# Patient Record
Sex: FEMALE | Race: WHITE | Employment: FULL TIME
[De-identification: names, ages, dates, MRNs, and addresses within clinical notes are randomized per-mention and may not be internally consistent; named-entity substitution may affect disease eponyms.]

---

## 2017-01-31 ENCOUNTER — OFFICE VISIT (OUTPATIENT)
Dept: FAMILY MEDICINE CLINIC | Facility: CLINIC | Age: 42
End: 2017-01-31

## 2017-01-31 VITALS
DIASTOLIC BLOOD PRESSURE: 78 MMHG | OXYGEN SATURATION: 98 % | WEIGHT: 200 LBS | HEART RATE: 72 BPM | BODY MASS INDEX: 33.28 KG/M2 | SYSTOLIC BLOOD PRESSURE: 138 MMHG | RESPIRATION RATE: 14 BRPM

## 2017-01-31 DIAGNOSIS — R07.9 INTERMITTENT CHEST PAIN: ICD-10-CM

## 2017-01-31 DIAGNOSIS — Z72.0 TOBACCO ABUSE: Primary | ICD-10-CM

## 2017-01-31 DIAGNOSIS — R60.9 PERIPHERAL EDEMA: Primary | ICD-10-CM

## 2017-01-31 DIAGNOSIS — R06.02 SHORTNESS OF BREATH: ICD-10-CM

## 2017-01-31 PROCEDURE — 99213 OFFICE O/P EST LOW 20 MIN: CPT | Performed by: NURSE PRACTITIONER

## 2017-01-31 RX ORDER — VARENICLINE TARTRATE 1 MG/1
TABLET, FILM COATED ORAL
Qty: 60 TABLET | Refills: 0 | Status: CANCELLED | OUTPATIENT
Start: 2017-01-31

## 2017-01-31 RX ORDER — VARENICLINE TARTRATE 0.5 MG/1
TABLET, FILM COATED ORAL
Qty: 95 TABLET | Refills: 0 | Status: SHIPPED | OUTPATIENT
Start: 2017-01-31 | End: 2017-06-05

## 2017-01-31 ASSESSMENT — ENCOUNTER SYMPTOMS
COUGH: 0
NAUSEA: 0
COLOR CHANGE: 0
ABDOMINAL PAIN: 0
WHEEZING: 0
VOMITING: 0
SHORTNESS OF BREATH: 1

## 2017-02-13 ENCOUNTER — TELEPHONE (OUTPATIENT)
Dept: FAMILY MEDICINE CLINIC | Facility: CLINIC | Age: 42
End: 2017-02-13

## 2017-02-16 ENCOUNTER — OFFICE VISIT (OUTPATIENT)
Dept: FAMILY MEDICINE CLINIC | Facility: CLINIC | Age: 42
End: 2017-02-16

## 2017-02-16 VITALS
SYSTOLIC BLOOD PRESSURE: 108 MMHG | HEART RATE: 75 BPM | WEIGHT: 199 LBS | DIASTOLIC BLOOD PRESSURE: 72 MMHG | BODY MASS INDEX: 33.12 KG/M2

## 2017-02-16 DIAGNOSIS — I82.401 ACUTE DEEP VEIN THROMBOSIS (DVT) OF RIGHT LOWER EXTREMITY, UNSPECIFIED VEIN (HCC): ICD-10-CM

## 2017-02-16 DIAGNOSIS — F41.8 SITUATIONAL ANXIETY: ICD-10-CM

## 2017-02-16 DIAGNOSIS — I82.C12: Primary | ICD-10-CM

## 2017-02-16 PROBLEM — I82.C19: Status: ACTIVE | Noted: 2017-02-16

## 2017-02-16 PROCEDURE — 99214 OFFICE O/P EST MOD 30 MIN: CPT | Performed by: NURSE PRACTITIONER

## 2017-02-16 RX ORDER — ALPRAZOLAM 0.25 MG/1
0.25 TABLET ORAL 3 TIMES DAILY PRN
Qty: 30 TABLET | Refills: 0 | Status: SHIPPED | OUTPATIENT
Start: 2017-02-16 | End: 2019-05-06 | Stop reason: SDUPTHER

## 2017-02-16 RX ORDER — APIXABAN 5 MG/1
5 TABLET, FILM COATED ORAL 2 TIMES DAILY
Refills: 0 | COMMUNITY
Start: 2017-02-12 | End: 2017-06-05 | Stop reason: SDUPTHER

## 2017-02-16 ASSESSMENT — ENCOUNTER SYMPTOMS
BLOOD IN STOOL: 0
NAUSEA: 0
ORTHOPNEA: 0
SHORTNESS OF BREATH: 1
VOMITING: 0

## 2017-02-20 ENCOUNTER — APPOINTMENT (OUTPATIENT)
Dept: CT IMAGING | Age: 42
End: 2017-02-20
Payer: COMMERCIAL

## 2017-02-20 ENCOUNTER — HOSPITAL ENCOUNTER (OUTPATIENT)
Age: 42
Discharge: HOME | End: 2017-02-20
Payer: COMMERCIAL

## 2017-02-20 ENCOUNTER — HOSPITAL ENCOUNTER (EMERGENCY)
Age: 42
Discharge: HOME | End: 2017-02-20
Attending: EMERGENCY MEDICINE
Payer: COMMERCIAL

## 2017-02-20 ENCOUNTER — APPOINTMENT (OUTPATIENT)
Dept: GENERAL RADIOLOGY | Age: 42
End: 2017-02-20
Payer: COMMERCIAL

## 2017-02-20 ENCOUNTER — TELEPHONE (OUTPATIENT)
Dept: FAMILY MEDICINE CLINIC | Facility: CLINIC | Age: 42
End: 2017-02-20

## 2017-02-20 VITALS
SYSTOLIC BLOOD PRESSURE: 119 MMHG | BODY MASS INDEX: 33.15 KG/M2 | DIASTOLIC BLOOD PRESSURE: 68 MMHG | OXYGEN SATURATION: 96 % | TEMPERATURE: 98.3 F | WEIGHT: 199 LBS | HEART RATE: 61 BPM | HEIGHT: 65 IN | RESPIRATION RATE: 18 BRPM

## 2017-02-20 DIAGNOSIS — Z86.718 HISTORY OF DVT (DEEP VEIN THROMBOSIS): Primary | ICD-10-CM

## 2017-02-20 DIAGNOSIS — R06.02 SHORTNESS OF BREATH: Primary | ICD-10-CM

## 2017-02-20 LAB
ABSOLUTE EOS #: 0.1 K/UL (ref 0–0.4)
ABSOLUTE LYMPH #: 2.5 K/UL (ref 1–4.8)
ABSOLUTE MONO #: 0.6 K/UL (ref 0.1–1.3)
ANION GAP SERPL CALCULATED.3IONS-SCNC: 15 MMOL/L (ref 9–17)
BASOPHILS # BLD: 1 % (ref 0–2)
BASOPHILS ABSOLUTE: 0.1 K/UL (ref 0–0.2)
BUN BLDV-MCNC: 20 MG/DL (ref 6–20)
BUN/CREAT BLD: ABNORMAL (ref 9–20)
CALCIUM SERPL-MCNC: 9.9 MG/DL (ref 8.6–10.4)
CHLORIDE BLD-SCNC: 102 MMOL/L (ref 98–107)
CO2: 25 MMOL/L (ref 20–31)
CREAT SERPL-MCNC: 0.75 MG/DL (ref 0.5–0.9)
DIFFERENTIAL TYPE: ABNORMAL
EOSINOPHILS RELATIVE PERCENT: 1 % (ref 0–4)
GFR AFRICAN AMERICAN: >60 ML/MIN
GFR NON-AFRICAN AMERICAN: >60 ML/MIN
GFR SERPL CREATININE-BSD FRML MDRD: ABNORMAL ML/MIN/{1.73_M2}
GFR SERPL CREATININE-BSD FRML MDRD: ABNORMAL ML/MIN/{1.73_M2}
GLUCOSE BLD-MCNC: 124 MG/DL (ref 70–99)
HCT VFR BLD CALC: 42.9 % (ref 36–46)
HEMOGLOBIN: 14.2 G/DL (ref 12–16)
LYMPHOCYTES # BLD: 24 % (ref 24–44)
MAGNESIUM: 2 MG/DL (ref 1.6–2.6)
MCH RBC QN AUTO: 30.2 PG (ref 26–34)
MCHC RBC AUTO-ENTMCNC: 33.1 G/DL (ref 31–37)
MCV RBC AUTO: 91.2 FL (ref 80–100)
MONOCYTES # BLD: 6 % (ref 1–7)
PDW BLD-RTO: 14.5 % (ref 11.5–14.9)
PHOSPHORUS: 4.1 MG/DL (ref 2.6–4.5)
PLATELET # BLD: 255 K/UL (ref 150–450)
PLATELET ESTIMATE: ABNORMAL
PMV BLD AUTO: 8.3 FL (ref 6–12)
POTASSIUM SERPL-SCNC: 4.1 MMOL/L (ref 3.7–5.3)
RBC # BLD: 4.7 M/UL (ref 4–5.2)
RBC # BLD: ABNORMAL 10*6/UL
SEG NEUTROPHILS: 68 % (ref 36–66)
SEGMENTED NEUTROPHILS ABSOLUTE COUNT: 7 K/UL (ref 1.3–9.1)
SODIUM BLD-SCNC: 142 MMOL/L (ref 135–144)
TROPONIN INTERP: NORMAL
TROPONIN T: <0.03 NG/ML
WBC # BLD: 10.3 K/UL (ref 3.5–11)
WBC # BLD: ABNORMAL 10*3/UL

## 2017-02-20 PROCEDURE — 84484 ASSAY OF TROPONIN QUANT: CPT

## 2017-02-20 PROCEDURE — 2580000003 HC RX 258: Performed by: EMERGENCY MEDICINE

## 2017-02-20 PROCEDURE — 6360000004 HC RX CONTRAST MEDICATION: Performed by: EMERGENCY MEDICINE

## 2017-02-20 PROCEDURE — 85025 COMPLETE CBC W/AUTO DIFF WBC: CPT

## 2017-02-20 PROCEDURE — 80048 BASIC METABOLIC PNL TOTAL CA: CPT

## 2017-02-20 PROCEDURE — 71260 CT THORAX DX C+: CPT

## 2017-02-20 PROCEDURE — 83735 ASSAY OF MAGNESIUM: CPT

## 2017-02-20 PROCEDURE — 71260 CT THORAX DX C+: CPT | Performed by: RADIOLOGY

## 2017-02-20 PROCEDURE — 99285 EMERGENCY DEPT VISIT HI MDM: CPT

## 2017-02-20 PROCEDURE — 84100 ASSAY OF PHOSPHORUS: CPT

## 2017-02-20 PROCEDURE — 36415 COLL VENOUS BLD VENIPUNCTURE: CPT

## 2017-02-20 PROCEDURE — 93005 ELECTROCARDIOGRAM TRACING: CPT

## 2017-02-20 RX ORDER — 0.9 % SODIUM CHLORIDE 0.9 %
100 INTRAVENOUS SOLUTION INTRAVENOUS ONCE
Status: COMPLETED | OUTPATIENT
Start: 2017-02-20 | End: 2017-02-20

## 2017-02-20 RX ORDER — SODIUM CHLORIDE 0.9 % (FLUSH) 0.9 %
10 SYRINGE (ML) INJECTION PRN
Status: DISCONTINUED | OUTPATIENT
Start: 2017-02-20 | End: 2017-02-20 | Stop reason: HOSPADM

## 2017-02-20 RX ADMIN — Medication 10 ML: at 18:54

## 2017-02-20 RX ADMIN — SODIUM CHLORIDE 100 ML: 9 INJECTION, SOLUTION INTRAVENOUS at 18:54

## 2017-02-20 RX ADMIN — IOVERSOL 100 ML: 741 INJECTION INTRA-ARTERIAL; INTRAVENOUS at 18:50

## 2017-02-20 RX ADMIN — Medication 10 ML: at 19:47

## 2017-02-20 ASSESSMENT — ENCOUNTER SYMPTOMS
COUGH: 0
SORE THROAT: 0
DIARRHEA: 0
ABDOMINAL DISTENTION: 0
CHEST TIGHTNESS: 0
STRIDOR: 0
SHORTNESS OF BREATH: 1
VOMITING: 0
BLOOD IN STOOL: 0
WHEEZING: 0
ABDOMINAL PAIN: 0
NAUSEA: 0

## 2017-02-20 ASSESSMENT — PAIN DESCRIPTION - PAIN TYPE: TYPE: ACUTE PAIN

## 2017-02-20 ASSESSMENT — PAIN SCALES - GENERAL: PAINLEVEL_OUTOF10: 5

## 2017-02-20 ASSESSMENT — PAIN DESCRIPTION - LOCATION: LOCATION: BACK

## 2017-02-20 ASSESSMENT — PAIN DESCRIPTION - ORIENTATION: ORIENTATION: LEFT

## 2017-02-21 ENCOUNTER — OFFICE VISIT (OUTPATIENT)
Dept: FAMILY MEDICINE CLINIC | Facility: CLINIC | Age: 42
End: 2017-02-21

## 2017-02-21 VITALS
RESPIRATION RATE: 14 BRPM | SYSTOLIC BLOOD PRESSURE: 107 MMHG | BODY MASS INDEX: 31.95 KG/M2 | WEIGHT: 192 LBS | HEART RATE: 66 BPM | DIASTOLIC BLOOD PRESSURE: 76 MMHG

## 2017-02-21 DIAGNOSIS — I82.401 ACUTE DEEP VEIN THROMBOSIS (DVT) OF RIGHT LOWER EXTREMITY, UNSPECIFIED VEIN (HCC): ICD-10-CM

## 2017-02-21 DIAGNOSIS — R06.02 SHORTNESS OF BREATH: ICD-10-CM

## 2017-02-21 DIAGNOSIS — M54.6 ACUTE LEFT-SIDED THORACIC BACK PAIN: ICD-10-CM

## 2017-02-21 DIAGNOSIS — I82.C12: Primary | ICD-10-CM

## 2017-02-21 LAB
EKG ATRIAL RATE: 61 BPM
EKG P AXIS: 60 DEGREES
EKG P-R INTERVAL: 190 MS
EKG Q-T INTERVAL: 422 MS
EKG QRS DURATION: 104 MS
EKG QTC CALCULATION (BAZETT): 424 MS
EKG R AXIS: 54 DEGREES
EKG T AXIS: 42 DEGREES
EKG VENTRICULAR RATE: 61 BPM

## 2017-02-21 PROCEDURE — 99214 OFFICE O/P EST MOD 30 MIN: CPT | Performed by: NURSE PRACTITIONER

## 2017-02-21 ASSESSMENT — ENCOUNTER SYMPTOMS
COLOR CHANGE: 0
BACK PAIN: 1
PHOTOPHOBIA: 0

## 2017-02-22 RX ADMIN — Medication 10 ML: at 08:58

## 2017-02-22 RX ADMIN — Medication 10 ML: at 08:59

## 2017-02-23 ENCOUNTER — TELEPHONE (OUTPATIENT)
Dept: FAMILY MEDICINE CLINIC | Facility: CLINIC | Age: 42
End: 2017-02-23

## 2017-03-06 ENCOUNTER — HOSPITAL ENCOUNTER (OUTPATIENT)
Age: 42
Discharge: HOME | End: 2017-03-06
Payer: COMMERCIAL

## 2017-03-06 ENCOUNTER — INITIAL CONSULT (OUTPATIENT)
Dept: ONCOLOGY | Facility: CLINIC | Age: 42
End: 2017-03-06

## 2017-03-06 ENCOUNTER — HOSPITAL ENCOUNTER (OUTPATIENT)
Facility: MEDICAL CENTER | Age: 42
Discharge: HOME | End: 2017-03-06
Payer: COMMERCIAL

## 2017-03-06 VITALS
TEMPERATURE: 98.4 F | WEIGHT: 202.7 LBS | RESPIRATION RATE: 16 BRPM | HEART RATE: 64 BPM | HEIGHT: 66 IN | BODY MASS INDEX: 32.58 KG/M2 | DIASTOLIC BLOOD PRESSURE: 65 MMHG | SYSTOLIC BLOOD PRESSURE: 109 MMHG

## 2017-03-06 DIAGNOSIS — D68.59 HYPERCOAGULABLE STATE (HCC): ICD-10-CM

## 2017-03-06 DIAGNOSIS — I82.401 ACUTE DEEP VEIN THROMBOSIS (DVT) OF RIGHT LOWER EXTREMITY, UNSPECIFIED VEIN (HCC): ICD-10-CM

## 2017-03-06 DIAGNOSIS — D68.59 HYPERCOAGULABLE STATE (HCC): Primary | ICD-10-CM

## 2017-03-06 LAB — HOMOCYSTEINE: 7.9 UMOL/L

## 2017-03-06 PROCEDURE — 81241 F5 GENE: CPT

## 2017-03-06 PROCEDURE — 85306 CLOT INHIBIT PROT S FREE: CPT

## 2017-03-06 PROCEDURE — 85303 CLOT INHIBIT PROT C ACTIVITY: CPT

## 2017-03-06 PROCEDURE — 85300 ANTITHROMBIN III ACTIVITY: CPT

## 2017-03-06 PROCEDURE — 81291 MTHFR GENE: CPT

## 2017-03-06 PROCEDURE — 36415 COLL VENOUS BLD VENIPUNCTURE: CPT

## 2017-03-06 PROCEDURE — G0463 HOSPITAL OUTPT CLINIC VISIT: HCPCS | Performed by: INTERNAL MEDICINE

## 2017-03-06 PROCEDURE — 81240 F2 GENE: CPT

## 2017-03-06 PROCEDURE — 86147 CARDIOLIPIN ANTIBODY EA IG: CPT

## 2017-03-06 PROCEDURE — 99244 OFF/OP CNSLTJ NEW/EST MOD 40: CPT | Performed by: INTERNAL MEDICINE

## 2017-03-06 PROCEDURE — 83090 ASSAY OF HOMOCYSTEINE: CPT

## 2017-03-06 RX ORDER — AMLODIPINE BESYLATE 5 MG/1
5 TABLET ORAL DAILY
COMMUNITY
End: 2017-04-03 | Stop reason: ALTCHOICE

## 2017-03-07 LAB
AT-III ACTIVITY: 115 % (ref 83–122)
PROTEIN C ACTIVITY: 129 %
PROTEIN S ACTIVITY: >130 % (ref 59–130)

## 2017-03-09 LAB
ANTICARDIOLIPIN IGA ANTIBODY: 2.4 APU
ANTICARDIOLIPIN IGG ANTIBODY: 1.4 GPU
CARDIOLIPIN AB IGM: 11.2 MPU

## 2017-03-15 LAB
FACTOR V LEIDEN MUTATION: NORMAL
MTHFR MUTATION 677T/A1298C: NORMAL
PROTHROMBIN G20210A MUTATION: NORMAL

## 2017-04-03 ENCOUNTER — HOSPITAL ENCOUNTER (OUTPATIENT)
Age: 42
Discharge: HOME | End: 2017-04-03
Payer: COMMERCIAL

## 2017-04-03 ENCOUNTER — OFFICE VISIT (OUTPATIENT)
Dept: ONCOLOGY | Age: 42
End: 2017-04-03
Payer: COMMERCIAL

## 2017-04-03 VITALS
SYSTOLIC BLOOD PRESSURE: 123 MMHG | DIASTOLIC BLOOD PRESSURE: 67 MMHG | BODY MASS INDEX: 33.64 KG/M2 | TEMPERATURE: 98.2 F | RESPIRATION RATE: 20 BRPM | HEART RATE: 68 BPM | WEIGHT: 205.3 LBS

## 2017-04-03 DIAGNOSIS — I82.401 ACUTE DEEP VEIN THROMBOSIS (DVT) OF RIGHT LOWER EXTREMITY, UNSPECIFIED VEIN (HCC): Primary | ICD-10-CM

## 2017-04-03 PROCEDURE — 99214 OFFICE O/P EST MOD 30 MIN: CPT | Performed by: INTERNAL MEDICINE

## 2017-04-03 PROCEDURE — 99211 OFF/OP EST MAY X REQ PHY/QHP: CPT

## 2017-05-02 PROBLEM — M25.562 LEFT KNEE PAIN: Status: ACTIVE | Noted: 2017-05-02

## 2017-05-18 DIAGNOSIS — J45.20 MILD INTERMITTENT ASTHMA WITHOUT COMPLICATION: ICD-10-CM

## 2017-05-18 RX ORDER — ALBUTEROL SULFATE 90 UG/1
1-2 AEROSOL, METERED RESPIRATORY (INHALATION) EVERY 4 HOURS PRN
Qty: 1 INHALER | Refills: 3 | Status: SHIPPED | OUTPATIENT
Start: 2017-05-18 | End: 2018-05-06 | Stop reason: SDUPTHER

## 2017-05-18 RX ORDER — ALBUTEROL SULFATE 90 MCG
HFA AEROSOL WITH ADAPTER (GRAM) INHALATION
Qty: 1 INHALER | Refills: 3 | Status: CANCELLED | OUTPATIENT
Start: 2017-05-18

## 2017-06-05 ENCOUNTER — OFFICE VISIT (OUTPATIENT)
Dept: FAMILY MEDICINE CLINIC | Age: 42
End: 2017-06-05
Payer: COMMERCIAL

## 2017-06-05 VITALS
WEIGHT: 202 LBS | BODY MASS INDEX: 33.1 KG/M2 | RESPIRATION RATE: 16 BRPM | SYSTOLIC BLOOD PRESSURE: 108 MMHG | DIASTOLIC BLOOD PRESSURE: 70 MMHG | HEART RATE: 74 BPM

## 2017-06-05 DIAGNOSIS — M54.5 LOW BACK PAIN, UNSPECIFIED BACK PAIN LATERALITY, UNSPECIFIED CHRONICITY, WITH SCIATICA PRESENCE UNSPECIFIED: ICD-10-CM

## 2017-06-05 DIAGNOSIS — D68.59 HYPERCOAGULABLE STATE (HCC): Primary | ICD-10-CM

## 2017-06-05 DIAGNOSIS — F41.9 ANXIETY: ICD-10-CM

## 2017-06-05 DIAGNOSIS — M25.562 LEFT KNEE PAIN, UNSPECIFIED CHRONICITY: ICD-10-CM

## 2017-06-05 DIAGNOSIS — M25.562 LEFT KNEE PAIN, UNSPECIFIED CHRONICITY: Primary | ICD-10-CM

## 2017-06-05 DIAGNOSIS — J45.20 MILD INTERMITTENT ASTHMA WITHOUT COMPLICATION: ICD-10-CM

## 2017-06-05 PROCEDURE — 99214 OFFICE O/P EST MOD 30 MIN: CPT | Performed by: FAMILY MEDICINE

## 2017-06-05 RX ORDER — CHLORDIAZEPOXIDE HYDROCHLORIDE 5 MG/1
5 CAPSULE, GELATIN COATED ORAL 3 TIMES DAILY PRN
Qty: 30 CAPSULE | Refills: 2 | Status: SHIPPED | OUTPATIENT
Start: 2017-06-05 | End: 2017-07-05

## 2017-06-12 ENCOUNTER — HOSPITAL ENCOUNTER (OUTPATIENT)
Dept: PHYSICAL THERAPY | Facility: CLINIC | Age: 42
Setting detail: THERAPIES SERIES
Discharge: HOME | End: 2017-06-12
Payer: COMMERCIAL

## 2017-06-12 PROCEDURE — 97110 THERAPEUTIC EXERCISES: CPT

## 2017-06-12 PROCEDURE — 97162 PT EVAL MOD COMPLEX 30 MIN: CPT

## 2017-06-16 ENCOUNTER — HOSPITAL ENCOUNTER (OUTPATIENT)
Dept: PHYSICAL THERAPY | Facility: CLINIC | Age: 42
Setting detail: THERAPIES SERIES
Discharge: HOME | End: 2017-06-16
Payer: COMMERCIAL

## 2017-07-14 ENCOUNTER — HOSPITAL ENCOUNTER (EMERGENCY)
Age: 42
Discharge: HOME | End: 2017-07-14
Attending: EMERGENCY MEDICINE
Payer: COMMERCIAL

## 2017-07-14 ENCOUNTER — APPOINTMENT (OUTPATIENT)
Dept: ULTRASOUND IMAGING | Age: 42
End: 2017-07-14
Payer: COMMERCIAL

## 2017-07-14 VITALS
TEMPERATURE: 98.1 F | OXYGEN SATURATION: 95 % | SYSTOLIC BLOOD PRESSURE: 122 MMHG | HEIGHT: 65 IN | RESPIRATION RATE: 18 BRPM | HEART RATE: 87 BPM | BODY MASS INDEX: 33.32 KG/M2 | DIASTOLIC BLOOD PRESSURE: 80 MMHG | WEIGHT: 200 LBS

## 2017-07-14 DIAGNOSIS — S86.912A MUSCLE STRAIN, LOWER LEG, LEFT, INITIAL ENCOUNTER: Primary | ICD-10-CM

## 2017-07-14 PROCEDURE — 99283 EMERGENCY DEPT VISIT LOW MDM: CPT

## 2017-07-14 PROCEDURE — 93971 EXTREMITY STUDY: CPT

## 2017-07-14 PROCEDURE — 6370000000 HC RX 637 (ALT 250 FOR IP): Performed by: EMERGENCY MEDICINE

## 2017-07-14 RX ORDER — CYCLOBENZAPRINE HCL 10 MG
10 TABLET ORAL 3 TIMES DAILY PRN
Qty: 20 TABLET | Refills: 0 | Status: SHIPPED | OUTPATIENT
Start: 2017-07-14 | End: 2017-07-24

## 2017-07-14 RX ORDER — ACETAMINOPHEN 500 MG
1000 TABLET ORAL ONCE
Status: COMPLETED | OUTPATIENT
Start: 2017-07-14 | End: 2017-07-14

## 2017-07-14 RX ADMIN — ACETAMINOPHEN 1000 MG: 500 TABLET ORAL at 21:12

## 2017-07-14 ASSESSMENT — PAIN DESCRIPTION - PAIN TYPE: TYPE: ACUTE PAIN

## 2017-07-14 ASSESSMENT — PAIN DESCRIPTION - LOCATION
LOCATION: LEG
LOCATION: LEG

## 2017-07-14 ASSESSMENT — PAIN DESCRIPTION - DESCRIPTORS: DESCRIPTORS: STABBING

## 2017-07-14 ASSESSMENT — PAIN SCALES - GENERAL
PAINLEVEL_OUTOF10: 8
PAINLEVEL_OUTOF10: 8

## 2017-07-14 ASSESSMENT — PAIN DESCRIPTION - ORIENTATION
ORIENTATION: LEFT
ORIENTATION: LEFT;LOWER

## 2017-07-14 ASSESSMENT — PAIN DESCRIPTION - PROGRESSION: CLINICAL_PROGRESSION: NOT CHANGED

## 2017-07-16 ASSESSMENT — ENCOUNTER SYMPTOMS
SHORTNESS OF BREATH: 0
SORE THROAT: 0
EYE REDNESS: 0
CONSTIPATION: 0
EYE PAIN: 0
COLOR CHANGE: 0
RHINORRHEA: 0
WHEEZING: 0
EYE DISCHARGE: 0
COUGH: 0
ABDOMINAL PAIN: 0
BACK PAIN: 0
NAUSEA: 0
CHEST TIGHTNESS: 0
DIARRHEA: 0

## 2017-08-02 ENCOUNTER — OFFICE VISIT (OUTPATIENT)
Dept: FAMILY MEDICINE CLINIC | Age: 42
End: 2017-08-02
Payer: COMMERCIAL

## 2017-08-02 VITALS
WEIGHT: 195 LBS | SYSTOLIC BLOOD PRESSURE: 105 MMHG | RESPIRATION RATE: 16 BRPM | BODY MASS INDEX: 32.45 KG/M2 | DIASTOLIC BLOOD PRESSURE: 72 MMHG | HEART RATE: 88 BPM

## 2017-08-02 DIAGNOSIS — M25.562 LEFT KNEE PAIN, UNSPECIFIED CHRONICITY: ICD-10-CM

## 2017-08-02 DIAGNOSIS — F43.9 STRESS: ICD-10-CM

## 2017-08-02 DIAGNOSIS — Z72.0 TOBACCO ABUSE: ICD-10-CM

## 2017-08-02 DIAGNOSIS — M54.5 LOW BACK PAIN, UNSPECIFIED BACK PAIN LATERALITY, UNSPECIFIED CHRONICITY, WITH SCIATICA PRESENCE UNSPECIFIED: ICD-10-CM

## 2017-08-02 DIAGNOSIS — I82.401 ACUTE DEEP VEIN THROMBOSIS (DVT) OF RIGHT LOWER EXTREMITY, UNSPECIFIED VEIN (HCC): Primary | ICD-10-CM

## 2017-08-02 DIAGNOSIS — I82.C12: ICD-10-CM

## 2017-08-02 PROCEDURE — 99214 OFFICE O/P EST MOD 30 MIN: CPT | Performed by: NURSE PRACTITIONER

## 2017-08-02 RX ORDER — BUPROPION HYDROCHLORIDE 150 MG/1
150 TABLET, EXTENDED RELEASE ORAL 2 TIMES DAILY
Qty: 60 TABLET | Refills: 3 | Status: SHIPPED | OUTPATIENT
Start: 2017-08-02 | End: 2017-12-04

## 2017-08-02 RX ORDER — ASPIRIN 81 MG/1
81 TABLET, CHEWABLE ORAL
COMMUNITY
Start: 2017-07-19 | End: 2018-10-15 | Stop reason: SDUPTHER

## 2017-12-04 ENCOUNTER — OFFICE VISIT (OUTPATIENT)
Dept: FAMILY MEDICINE CLINIC | Age: 42
End: 2017-12-04
Payer: COMMERCIAL

## 2017-12-04 VITALS
HEART RATE: 74 BPM | HEIGHT: 65 IN | WEIGHT: 194 LBS | SYSTOLIC BLOOD PRESSURE: 109 MMHG | BODY MASS INDEX: 32.32 KG/M2 | DIASTOLIC BLOOD PRESSURE: 82 MMHG

## 2017-12-04 DIAGNOSIS — F41.9 ANXIETY: Primary | ICD-10-CM

## 2017-12-04 DIAGNOSIS — Z13.1 ENCOUNTER FOR SCREENING FOR DIABETES MELLITUS: ICD-10-CM

## 2017-12-04 DIAGNOSIS — I82.401 ACUTE DEEP VEIN THROMBOSIS (DVT) OF RIGHT LOWER EXTREMITY, UNSPECIFIED VEIN (HCC): ICD-10-CM

## 2017-12-04 DIAGNOSIS — Z72.0 TOBACCO ABUSE: ICD-10-CM

## 2017-12-04 DIAGNOSIS — D68.59 HYPERCOAGULABLE STATE (HCC): ICD-10-CM

## 2017-12-04 PROBLEM — I82.C19: Status: RESOLVED | Noted: 2017-02-16 | Resolved: 2017-12-04

## 2017-12-04 PROCEDURE — 99214 OFFICE O/P EST MOD 30 MIN: CPT | Performed by: FAMILY MEDICINE

## 2017-12-04 RX ORDER — SERTRALINE HYDROCHLORIDE 25 MG/1
25 TABLET, FILM COATED ORAL DAILY
Qty: 30 TABLET | Refills: 1 | Status: SHIPPED | OUTPATIENT
Start: 2017-12-04 | End: 2018-01-15 | Stop reason: SDUPTHER

## 2017-12-04 NOTE — PATIENT INSTRUCTIONS
wait at least 14 days before you start taking an MAOI. To make sure sertraline is safe for you, tell your doctor if you have:  · liver or kidney disease;  · seizures or epilepsy;  · a bleeding or blood clotting disorder;  · bipolar disorder (manic depression); or  · a history of drug abuse or suicidal thoughts. Some young people have thoughts about suicide when first taking an antidepressant. Your doctor should check your progress at regular visits. Your family or other caregivers should also be alert to changes in your mood or symptoms. Taking an SSRI antidepressant during pregnancy may cause serious lung problems or other complications in the baby. However, you may have a relapse of depression if you stop taking your antidepressant. Tell your doctor right away if you become pregnant. Do not start or stop taking this medicine during pregnancy without your doctor's advice. It is not known whether sertraline passes into breast milk or if it could harm a nursing baby. Tell your doctor if you are breast-feeding a baby. Do not give sertraline to anyone younger than 25years old without the advice of a doctor. Sertraline is FDA-approved for children with obsessive-compulsive disorder (OCD). It is not approved for treating depression in children. How should I take sertraline? Follow all directions on your prescription label. Your doctor may occasionally change your dose to make sure you get the best results. Do not take this medicine in larger or smaller amounts or for longer than recommended. Sertraline may be taken with or without food. Try to take the medicine at the same time each day. The liquid (oral concentrate) form of sertraline must be diluted before you take it. To be sure you get the correct dose, measure the liquid with the medicine dropper provided. Mix the dose with 4 ounces (one-half cup) of water, ginger ale, lemon/lime soda, lemonade, or orange juice.  Do not use any other liquids to dilute the medicine. Stir this mixture and drink all of it right away. To make sure you get the entire dose, add a little more water to the same glass, swirl gently and drink right away. This medicine can cause you to have a false positive drug screening test. If you provide a urine sample for drug screening, tell the laboratory staff that you are taking sertraline. It may take up to 4 weeks before your symptoms improve. Keep using the medication as directed and tell your doctor if your symptoms do not improve. Do not stop using sertraline suddenly, or you could have unpleasant withdrawal symptoms. Ask your doctor how to safely stop using sertraline. Store at room temperature away from moisture and heat. What happens if I miss a dose? Take the missed dose as soon as you remember. Skip the missed dose if it is almost time for your next scheduled dose. Do not take extra medicine to make up the missed dose. What happens if I overdose? Seek emergency medical attention or call the Poison Help line at 1-253.316.7281. What should I avoid while taking sertraline? Ask your doctor before taking a nonsteroidal anti-inflammatory drug (NSAID) for pain, arthritis, fever, or swelling. This includes aspirin, ibuprofen (Advil, Motrin), naproxen (Aleve), celecoxib (Celebrex), diclofenac, indomethacin, meloxicam, and others. Using an NSAID with sertraline may cause you to bruise or bleed easily. Drinking alcohol can increase certain side effects of sertraline. Do not take the liquid form of sertraline if you are taking disulfiram (Antabuse). Liquid sertraline may contain alcohol and you could have a severe reaction to the disulfiram.  This medication may impair your thinking or reactions. Be careful if you drive or do anything that requires you to be alert. What are the possible side effects of sertraline?   Get emergency medical help if you have signs of an allergic reaction: skin rash or hives (with or without fever or joint pain); the aid of information Jonah provides. The information contained herein is not intended to cover all possible uses, directions, precautions, warnings, drug interactions, allergic reactions, or adverse effects. If you have questions about the drugs you are taking, check with your doctor, nurse or pharmacist.  Copyright 7276-9452 [address]: 20.04. Revision date: 9/23/2015. Care instructions adapted under license by [facility] ([facility]). If you have questions about a medical condition or this instruction, always ask your healthcare professional. [place] [address] any warranty or liability for your use of this information.

## 2017-12-04 NOTE — PROGRESS NOTES
thrombosis (DVT) of right lower extremity, unspecified vein ([address]     3. Hypercoagulable state ([address]     4. Tobacco abuse     5. Encounter for screening for diabetes mellitus  Glucose, Fasting       Medications, laboratory testing, imaging, consultation, and follow up as documented in this encounter. I suspect she is suffering from anxiety, versus bipolar 1. I recommended a trial of Zoloft. She was advised of potential adverse effects associated with use the medication and that it may take several weeks to see a therapeutic effect. Her DVT has resolved and will be removed from the problem list.  Continue daily aspirin. I've indicated that I would like her to quit smoking at some point. Check screening labs. Follow up in our office in approximately 4 weeks or as needed. Mari Mosley received counseling on the following healthy behaviors: medication adherence    Patient given educational materials on Zoloft. Was a self-tracking handout given in paper form or via [employer]? No  If yes, see orders or list here. Discussed use, benefit, and side effects of prescribed medications. Barriers to medication compliance addressed. All patient questions answered. Pt voiced understanding. This note is created with the assistance of a speech-recognition program.  While intending to generate a document that actually reflects the content of the visit, no guarantees can be provided that every mistake has been identified and corrected by editing.

## 2018-01-15 ENCOUNTER — OFFICE VISIT (OUTPATIENT)
Dept: FAMILY MEDICINE CLINIC | Age: 43
End: 2018-01-15
Payer: COMMERCIAL

## 2018-01-15 VITALS
HEART RATE: 78 BPM | RESPIRATION RATE: 16 BRPM | SYSTOLIC BLOOD PRESSURE: 107 MMHG | BODY MASS INDEX: 30.62 KG/M2 | WEIGHT: 184 LBS | DIASTOLIC BLOOD PRESSURE: 70 MMHG

## 2018-01-15 DIAGNOSIS — F41.9 ANXIETY: Primary | ICD-10-CM

## 2018-01-15 PROCEDURE — 99213 OFFICE O/P EST LOW 20 MIN: CPT | Performed by: FAMILY MEDICINE

## 2018-01-15 RX ORDER — SERTRALINE HYDROCHLORIDE 25 MG/1
25 TABLET, FILM COATED ORAL DAILY
Qty: 30 TABLET | Refills: 1 | Status: CANCELLED | OUTPATIENT
Start: 2018-01-15

## 2018-01-15 NOTE — PROGRESS NOTES
Visit Information    Have you changed or started any medications since your last visit including any over-the-counter medicines, vitamins, or herbal medicines? no   Have you stopped taking any of your medications? Is so, why? -  no  Are you having any side effects from any of your medications? - no    Have you seen any other physician or provider since your last visit?  no   Have you had any other diagnostic tests since your last visit?  no   Have you been seen in the emergency room and/or had an admission in a hospital since we last saw you?  no   Have you had your routine dental cleaning in the past 6 months?  yes      Do you have an active MyChart account? If no, what is the barrier?   Yes    Patient Care Team:  Rahul Domínguez MD as PCP - General (Family Medicine)  Rosetta Hamilton CNP as PCP - S Attributed Provider    Medical History Review  Past Medical, Family, and Social History reviewed and does contribute to the patient presenting condition    Health Maintenance   Topic Date Due    HIV screen  07/02/1988    Cervical cancer screen  07/02/1994    Diabetes screen  07/02/2013    Flu vaccine (1) 12/04/2018 (Originally 9/1/2017)    Lipid screen  08/28/2020    DTaP/Tdap/Td vaccine (2 - Td) 05/28/2024

## 2018-01-15 NOTE — PATIENT INSTRUCTIONS
social groups, or volunteer to help others. Being alone sometimes makes things seem worse than they are. · Get at least 30 minutes of exercise on most days of the week to relieve stress. Walking is a good choice. You also may want to do other activities, such as running, swimming, cycling, or playing tennis or team sports. Relaxation techniques  Do relaxation exercises 10 to 20 minutes a day. You can play soothing, relaxing music while you do them, if you wish. · Tell others in your house that you are going to do your relaxation exercises. Ask them not to disturb you. · Find a comfortable place, away from all distractions and noise. · Lie down on your back, or sit with your back straight. · Focus on your breathing. Make it slow and steady. · Breathe in through your nose. Breathe out through either your nose or mouth. · Breathe deeply, filling up the area between your navel and your rib cage. Breathe so that your belly goes up and down. · Do not hold your breath. · Breathe like this for 5 to 10 minutes. Notice the feeling of calmness throughout your whole body. As you continue to breathe slowly and deeply, relax by doing the following for another 5 to 10 minutes:  · Tighten and relax each muscle group in your body. You can begin at your toes and work your way up to your head. · Imagine your muscle groups relaxing and becoming heavy. · Empty your mind of all thoughts. · Let yourself relax more and more deeply. · Become aware of the state of calmness that surrounds you. · When your relaxation time is over, you can bring yourself back to alertness by moving your fingers and toes and then your hands and feet and then stretching and moving your entire body. Sometimes people fall asleep during relaxation, but they usually wake up shortly afterward. · Always give yourself time to return to full alertness before you drive a car or do anything that might cause an accident if you are not fully alert.  Never play a relaxation tape while you drive a car. When should you call for help? Call 911 anytime you think you may need emergency care. For example, call if:  ? · You feel you cannot stop from hurting yourself or someone else. ? Keep the numbers for these national suicide hotlines: 5-735-986-TALK (8-255.519.7821) and 4-833-BTGQWID (2-260.598.9066). If you or someone you know talks about suicide or feeling hopeless, get help right away. ? Watch closely for changes in your health, and be sure to contact your doctor if:  ? · You have anxiety or fear that affects your life. ? · You have symptoms of anxiety that are new or different from those you had before. Where can you learn more? Go to https://[employer]. org and sign in to your [employer] account. Enter P754 in the [employer] box to learn more about \"Anxiety Disorder: Care Instructions. \"     If you do not have an account, please click on the \"Sign Up Now\" link. Current as of: May 12, 2017  Content Version: 11.5  © 7388-6869 Healthwise, Incorporated. Care instructions adapted under license by [facility] ([facility]). If you have questions about a medical condition or this instruction, always ask your healthcare professional. [address] any warranty or liability for your use of this information.

## 2018-08-14 ENCOUNTER — OFFICE VISIT (OUTPATIENT)
Dept: PRIMARY CARE CLINIC | Age: 43
End: 2018-08-14
Payer: COMMERCIAL

## 2018-08-14 VITALS
HEART RATE: 91 BPM | TEMPERATURE: 98.6 F | HEIGHT: 65 IN | BODY MASS INDEX: 32.15 KG/M2 | SYSTOLIC BLOOD PRESSURE: 118 MMHG | WEIGHT: 193 LBS | DIASTOLIC BLOOD PRESSURE: 72 MMHG | OXYGEN SATURATION: 92 %

## 2018-08-14 DIAGNOSIS — R05.9 COUGH: ICD-10-CM

## 2018-08-14 DIAGNOSIS — J40 BRONCHITIS: Primary | ICD-10-CM

## 2018-08-14 DIAGNOSIS — J45.901 EXACERBATION OF ASTHMA, UNSPECIFIED ASTHMA SEVERITY, UNSPECIFIED WHETHER PERSISTENT: ICD-10-CM

## 2018-08-14 DIAGNOSIS — Z72.0 TOBACCO ABUSE: ICD-10-CM

## 2018-08-14 PROCEDURE — 94640 AIRWAY INHALATION TREATMENT: CPT | Performed by: NURSE PRACTITIONER

## 2018-08-14 PROCEDURE — 99214 OFFICE O/P EST MOD 30 MIN: CPT | Performed by: NURSE PRACTITIONER

## 2018-08-14 RX ORDER — ALBUTEROL SULFATE 2.5 MG/3ML
2.5 SOLUTION RESPIRATORY (INHALATION) ONCE
Status: COMPLETED | OUTPATIENT
Start: 2018-08-14 | End: 2018-08-14

## 2018-08-14 RX ORDER — PREDNISONE 20 MG/1
40 TABLET ORAL DAILY
Qty: 10 TABLET | Refills: 0 | Status: SHIPPED | OUTPATIENT
Start: 2018-08-14 | End: 2018-08-19

## 2018-08-14 RX ORDER — AZITHROMYCIN 250 MG/1
TABLET, FILM COATED ORAL
Qty: 6 TABLET | Refills: 0 | Status: SHIPPED | OUTPATIENT
Start: 2018-08-14 | End: 2018-08-24

## 2018-08-14 RX ORDER — BENZONATATE 200 MG/1
200 CAPSULE ORAL 3 TIMES DAILY PRN
Qty: 30 CAPSULE | Refills: 0 | Status: SHIPPED | OUTPATIENT
Start: 2018-08-14 | End: 2018-08-24

## 2018-08-14 RX ADMIN — ALBUTEROL SULFATE 2.5 MG: 2.5 SOLUTION RESPIRATORY (INHALATION) at 17:20

## 2018-08-14 ASSESSMENT — ENCOUNTER SYMPTOMS
WHEEZING: 1
SINUS PRESSURE: 0
NAUSEA: 0
HEMOPTYSIS: 0
CHEST TIGHTNESS: 1
VOMITING: 0
SHORTNESS OF BREATH: 1
SORE THROAT: 0
SINUS PAIN: 0
COUGH: 1
COLOR CHANGE: 0
RHINORRHEA: 0

## 2018-08-14 NOTE — PATIENT INSTRUCTIONS
breathing (breathing may stop). What should I avoid while taking benzonatate? Follow your doctor's instructions about any restrictions on food, beverages, or activity while you are using benzonatate  What are the possible side effects of benzonatate? Get emergency medical help if you have any of these signs of an allergic reaction:  hives; difficulty breathing; swelling of your face, lips, tongue, or throat. Stop taking benzonatate and call your doctor at once if you have a serious side effect such as:  · a choking feeling;  · chest pain or numbness;  · feeling like you might pass out;  · confusion; or  · hallucinations. Some of these side effects may result from chewing or sucking on a benzonatate capsule. Less serious side effects may include:  · headache;  · dizziness;  · drowsiness;  · nausea, vomiting, constipation; or  · mild itching or skin rash. This is not a complete list of side effects and others may occur. Call your doctor for medical advice about side effects. You may report side effects to FDA at 8-803-FDA-8084. What other drugs will affect benzonatate? Before taking benzonatate, tell your doctor if you regularly use other medicines that make you sleepy (such as cold or allergy medicine, sedatives, narcotic pain medicine, sleeping pills, muscle relaxers, and medicine for seizures, depression, or anxiety). They can add to drowsiness and other side effects of benzonatate. There may be other drugs that can interact with benzonatate. Tell your doctor about all medications you use. This includes prescription, over-the-counter, vitamin, and herbal products. Do not start a new medication without telling your doctor. Where can I get more information? Your pharmacist can provide more information about benzonatate. Remember, keep this and all other medicines out of the reach of children, never share your medicines with others, and use this medication only for the indication prescribed.   Every Zithromax TRI-EVARISTO, Zithromax Z-Evaristo, Zmax  What is the most important information I should know about azithromycin? You should not use this medication if you have ever had jaundice or liver problems caused by taking azithromycin. What is azithromycin? Azithromycin is an antibiotic that fights bacteria. Azithromycin is used to treat many different types of infections caused by bacteria, such as respiratory infections, skin infections, ear infections, and sexually transmitted diseases. Azithromycin may also be used for purposes not listed in this medication guide. What should I discuss with my healthcare provider before taking azithromycin? You should not use azithromycin if you are allergic to it, or if:  · you have ever had jaundice or liver problems caused by taking azithromycin; or  · you are allergic to similar drugs such as clarithromycin, erythromycin, or telithromycin. To make sure azithromycin is safe for you, tell your doctor if you have ever had:  · liver disease;  · kidney disease;  · myasthenia gravis;  · a heart rhythm disorder;  · low levels of potassium in your blood; or  · long QT syndrome (in you or a family member). This medicine is not expected to harm an unborn baby. Tell your doctor if you are pregnant or plan to become pregnant. It is not known whether azithromycin passes into breast milk or if it could harm a nursing baby. Tell your doctor if you are breast-feeding a baby. Do not give this medicine to a child younger than 7 months old. How should I take azithromycin? Follow all directions on your prescription label. Do not take this medicine in larger or smaller amounts or for longer than recommended. The dose and length of treatment with azithromycin may not be the same for every type of infection. You may take most forms of azithromycin with or without food.   Take Zmax extended release liquid (oral suspension) on an empty stomach, at least 1 hour before or 2 hours after a

## 2018-08-14 NOTE — PROGRESS NOTES
information    Asthma     Bronchitis     DVT (deep venous thrombosis) ([facility])       Past Surgical History:   Procedure Laterality Date    APPENDECTOMY      HYSTERECTOMY      TONSILLECTOMY         History reviewed. No pertinent family history. Social History   Substance Use Topics    Smoking status: Former Smoker     Packs/day: 0.50    Smokeless tobacco: Never Used      Comment: on Chantix    Alcohol use 1.2 oz/week     2 Cans of beer per week      Current Outpatient Prescriptions   Medication Sig Dispense Refill    azithromycin (ZITHROMAX) 250 MG tablet 2 tablets by mouth on day one. Then, 1 tablet by mouth daily for days 2-5. 6 tablet 0    predniSONE (DELTASONE) 20 MG tablet Take 2 tablets by mouth daily for 5 days 10 tablet 0    benzonatate (TESSALON) 200 MG capsule Take 1 capsule by mouth 3 times daily as needed for Cough 30 capsule 0    PROAIR  (90 Base) MCG/ACT inhaler INHALE ONE TO TWO PUFFS BY MOUTH EVERY 4 HOURS AS NEEDED FOR WHEEZING 1 Inhaler 3    aspirin 81 MG chewable tablet Take 81 mg by mouth       Current Facility-Administered Medications   Medication Dose Route Frequency Provider Last Rate Last Dose    albuterol (PROVENTIL) nebulizer solution 2.5 mg  2.5 mg Nebulization Once KIN Law CNP         Allergies   Allergen Reactions    Pneumovax [Pneumococcal Polysaccharide Vaccine] Swelling     Localized swelling and facial swelling       Health Maintenance   Topic Date Due    HIV screen  07/02/1988    Cervical cancer screen  07/02/1994    Diabetes screen  07/02/2013    Flu vaccine (1) 12/04/2018 (Originally 9/1/2018)    Lipid screen  08/28/2020    DTaP/Tdap/Td vaccine (2 - Td) 05/28/2024       Subjective:      Review of Systems   Constitutional: Negative for chills, fatigue and fever. HENT: Negative for congestion, ear pain, postnasal drip, rhinorrhea, sinus pain, sinus pressure and sore throat. Eyes: Negative for visual disturbance.    Respiratory: 193 lb (87.5 kg)   LMP 06/15/2015 (Exact Date)   SpO2 92%   BMI 32.12 kg/m²     Assessment:       Diagnosis Orders   1. Bronchitis  XR CHEST STANDARD (2 VW)    azithromycin (ZITHROMAX) 250 MG tablet    predniSONE (DELTASONE) 20 MG tablet    benzonatate (TESSALON) 200 MG capsule   2. Exacerbation of asthma, unspecified asthma severity, unspecified whether persistent  predniSONE (DELTASONE) 20 MG tablet    albuterol (PROVENTIL) nebulizer solution 2.5 mg   3. Cough  XR CHEST STANDARD (2 VW)    benzonatate (TESSALON) 200 MG capsule   4. Tobacco abuse         Plan:      Chnatal Fernández percent the office for sick visit in regards to ongoing cough she has a history of asthma. Still had bronchitis in the past by exam she did have rhonchi and wheezing noted to her posterior lung fields. We provided her a note-year-old treatment in the office. Her lung sounds approved she still had some mild scattered wheezes but symptoms improved. She was encouraged to continue albuterol inhaler at home as needed placed on azithromycin, oral steroids, and Tessalon Perles. Informed adverse effects his medication plenty of fluids continue to monitor. For the symptoms are persisting complete chest x-ray. Discussed if symptoms worse worsening shortness of breath chest pain pressure seek emergent care. Patient verbalized understanding tobacco cessation encouraged. Return if symptoms worsen or fail to improve. Orders Placed This Encounter   Procedures    XR CHEST STANDARD (2 VW)     Standing Status:   Future     Standing Expiration Date:   2019     Order Specific Question:   Reason for exam:     Answer:   cough positivebtobacco     Orders Placed This Encounter   Medications    azithromycin (ZITHROMAX) 250 MG tablet     Si tablets by mouth on day one. Then, 1 tablet by mouth daily for days 2-5.      Dispense:  6 tablet     Refill:  0    predniSONE (DELTASONE) 20 MG tablet     Sig: Take 2 tablets by mouth daily for 5 days Dispense:  10 tablet     Refill:  0    benzonatate (TESSALON) 200 MG capsule     Sig: Take 1 capsule by mouth 3 times daily as needed for Cough     Dispense:  30 capsule     Refill:  0    albuterol (PROVENTIL) nebulizer solution 2.5 mg       Patient given educational materials - see patient instructions. Discussed use, benefit, and side effects of prescribed medications. All patient questions answered. Pt voiced understanding. Reviewed health maintenance. Instructed to continue current medications, diet and exercise. Patient agreed with treatment plan. Follow up as directed. Electronically signed by KIN Wilburn CNP on 8/14/2018 at 5:42 PM       Of the 25 minute duration appointment visit, Wanda Dorsey CNP spent at least 50% of the face-to-face time in counseling, explanation of diagnosis, planning of further management, and answering all questions.

## 2018-10-08 ENCOUNTER — OFFICE VISIT (OUTPATIENT)
Dept: PRIMARY CARE CLINIC | Age: 43
End: 2018-10-08
Payer: COMMERCIAL

## 2018-10-08 ENCOUNTER — HOSPITAL ENCOUNTER (OUTPATIENT)
Age: 43
Discharge: HOME | End: 2018-10-10
Payer: COMMERCIAL

## 2018-10-08 ENCOUNTER — HOSPITAL ENCOUNTER (OUTPATIENT)
Dept: GENERAL RADIOLOGY | Age: 43
Discharge: HOME | End: 2018-10-10
Payer: COMMERCIAL

## 2018-10-08 VITALS
BODY MASS INDEX: 32.82 KG/M2 | SYSTOLIC BLOOD PRESSURE: 106 MMHG | WEIGHT: 197.2 LBS | RESPIRATION RATE: 20 BRPM | HEART RATE: 83 BPM | OXYGEN SATURATION: 98 % | DIASTOLIC BLOOD PRESSURE: 62 MMHG

## 2018-10-08 DIAGNOSIS — M54.30 SCIATICA, UNSPECIFIED LATERALITY: ICD-10-CM

## 2018-10-08 DIAGNOSIS — M54.30 SCIATICA, UNSPECIFIED LATERALITY: Primary | ICD-10-CM

## 2018-10-08 PROCEDURE — 99213 OFFICE O/P EST LOW 20 MIN: CPT | Performed by: FAMILY MEDICINE

## 2018-10-08 PROCEDURE — 72100 X-RAY EXAM L-S SPINE 2/3 VWS: CPT

## 2018-10-08 RX ORDER — HYDROCODONE BITARTRATE AND ACETAMINOPHEN 5; 325 MG/1; MG/1
1 TABLET ORAL EVERY 6 HOURS PRN
Qty: 28 TABLET | Refills: 0 | Status: SHIPPED | OUTPATIENT
Start: 2018-10-08 | End: 2018-10-15

## 2018-10-08 RX ORDER — PREDNISONE 20 MG/1
40 TABLET ORAL DAILY
Qty: 10 TABLET | Refills: 0 | Status: SHIPPED | OUTPATIENT
Start: 2018-10-08 | End: 2019-04-01 | Stop reason: SDUPTHER

## 2018-10-08 ASSESSMENT — PATIENT HEALTH QUESTIONNAIRE - PHQ9
1. LITTLE INTEREST OR PLEASURE IN DOING THINGS: 0
SUM OF ALL RESPONSES TO PHQ9 QUESTIONS 1 & 2: 0
SUM OF ALL RESPONSES TO PHQ QUESTIONS 1-9: 0
2. FEELING DOWN, DEPRESSED OR HOPELESS: 0
SUM OF ALL RESPONSES TO PHQ QUESTIONS 1-9: 0

## 2018-10-08 NOTE — PROGRESS NOTES
Subjective:  Ravinder Ramos is in for a sick call. She is complaining of low back pain. She fell and injured her tailbone several days ago. More recently, when bending, she has experienced pain that radiates down from her low back, into her buttocks, and down her legs. She's had no treatment thus far. She denies any loss of bladder or bowel continence. She denies any loss of perineal sensation. Objective:  /62 (Site: Right Upper Arm, Position: Sitting, Cuff Size: Large Adult)   Pulse 83   Resp 20   Wt 197 lb 3.2 oz (89.4 kg)   LMP 06/15/2015 (Exact Date)   SpO2 98%   BMI 32.82 kg/m²   Back: LS spine non-tender, 5/5 hip flexors and leg extensors, 2+ patellar reflexes, no sensory deficit L4-S1 dermatomes, negative SLR. Assessment:   Diagnosis Orders   1. Sciatica, unspecified laterality  XR LUMBAR SPINE (2-3 VIEWS)    predniSONE (DELTASONE) 20 MG tablet    [facility] [place]    HYDROcodone-acetaminophen (NORCO) 5-325 MG per tablet       Plan:  Medications, laboratory testing, imaging, consultation, and follow up as documented in this encounter. Treatment as indicated above. I've asked her to call our office tomorrow and let us know how she is doing with the prednisone and Norco.  She has been advised to seek emergent medical attention with any loss of bladder or bowel continence or development of other neurological symptoms. Otherwise, I have asked her to follow up in our office in approximately one week.

## 2018-10-09 ENCOUNTER — TELEPHONE (OUTPATIENT)
Dept: PRIMARY CARE CLINIC | Age: 43
End: 2018-10-09

## 2018-10-12 ENCOUNTER — HOSPITAL ENCOUNTER (OUTPATIENT)
Dept: PHYSICAL THERAPY | Facility: CLINIC | Age: 43
Setting detail: THERAPIES SERIES
Discharge: HOME | End: 2018-10-12
Payer: COMMERCIAL

## 2018-10-12 PROCEDURE — 97110 THERAPEUTIC EXERCISES: CPT

## 2018-10-12 PROCEDURE — 97161 PT EVAL LOW COMPLEX 20 MIN: CPT

## 2018-10-15 PROBLEM — E78.2 MIXED HYPERLIPIDEMIA: Status: ACTIVE | Noted: 2018-10-15

## 2018-10-15 PROBLEM — R01.1 MURMUR: Status: ACTIVE | Noted: 2018-10-15

## 2018-10-16 ENCOUNTER — HOSPITAL ENCOUNTER (OUTPATIENT)
Dept: PHYSICAL THERAPY | Facility: CLINIC | Age: 43
Setting detail: THERAPIES SERIES
Discharge: HOME | End: 2018-10-16
Payer: COMMERCIAL

## 2018-10-16 NOTE — FLOWSHEET NOTE
[] [address]  [address]    [phone]  F: [phone] [] [address]  [facility]   [address]  P: [phone]  F: [phone] [x] [place]  [address] [facility]  P: [phone]  F: [phone] [] [address] [facility] [address]   Isaac Hughess: [phone]  F: [phone] [] [facility]  [address] [facility] [address]  Isaac Hughess: [phone]   F: [phone]      Physical Therapy Cancel/No Show note    Date: 10/16/2018  Patient: Bettina Baugh  : 1975  MRN: [id number]    Cancels/No Shows to date:      For today's appointment patient:  [x]  Cancelled  []  Rescheduled appointment  []  No-show     Reason given by patient:  [x]  Patient ill  []  Conflicting appointment  []  No transportation    []  Conflict with work  []  No reason given  []  Weather related  []  Other:      Comments:      []  Next appointment was confirmed    Electronically signed by: Abigail Christensen

## 2018-10-23 ENCOUNTER — HOSPITAL ENCOUNTER (OUTPATIENT)
Dept: PHYSICAL THERAPY | Facility: CLINIC | Age: 43
Setting detail: THERAPIES SERIES
Discharge: HOME | End: 2018-10-23
Payer: COMMERCIAL

## 2018-11-07 ENCOUNTER — HOSPITAL ENCOUNTER (OUTPATIENT)
Dept: PHYSICAL THERAPY | Facility: CLINIC | Age: 43
Setting detail: THERAPIES SERIES
Discharge: HOME | End: 2018-11-07
Payer: COMMERCIAL

## 2019-04-01 ENCOUNTER — OFFICE VISIT (OUTPATIENT)
Dept: PRIMARY CARE CLINIC | Age: 44
End: 2019-04-01
Payer: COMMERCIAL

## 2019-04-01 VITALS
HEART RATE: 77 BPM | BODY MASS INDEX: 33.12 KG/M2 | WEIGHT: 198.8 LBS | DIASTOLIC BLOOD PRESSURE: 76 MMHG | OXYGEN SATURATION: 94 % | HEIGHT: 65 IN | RESPIRATION RATE: 16 BRPM | SYSTOLIC BLOOD PRESSURE: 110 MMHG

## 2019-04-01 DIAGNOSIS — M54.31 BILATERAL SCIATICA: ICD-10-CM

## 2019-04-01 DIAGNOSIS — M54.32 BILATERAL SCIATICA: ICD-10-CM

## 2019-04-01 DIAGNOSIS — F41.9 ANXIETY: Primary | ICD-10-CM

## 2019-04-01 DIAGNOSIS — Z78.0 MENOPAUSE: ICD-10-CM

## 2019-04-01 PROCEDURE — 99214 OFFICE O/P EST MOD 30 MIN: CPT | Performed by: FAMILY MEDICINE

## 2019-04-01 RX ORDER — PREDNISONE 20 MG/1
40 TABLET ORAL DAILY
Qty: 10 TABLET | Refills: 0 | Status: SHIPPED | OUTPATIENT
Start: 2019-04-01 | End: 2019-04-06

## 2019-04-01 ASSESSMENT — PATIENT HEALTH QUESTIONNAIRE - PHQ9
1. LITTLE INTEREST OR PLEASURE IN DOING THINGS: 2
SUM OF ALL RESPONSES TO PHQ QUESTIONS 1-9: 4
SUM OF ALL RESPONSES TO PHQ9 QUESTIONS 1 & 2: 4
SUM OF ALL RESPONSES TO PHQ QUESTIONS 1-9: 4
2. FEELING DOWN, DEPRESSED OR HOPELESS: 2

## 2019-04-01 NOTE — PATIENT INSTRUCTIONS
Patient Education        Sciatica: Care Instructions  Your Care Instructions    Sciatica (say \"etb-SH-nz-kuh\") is an irritation of one of the sciatic nerves, which come from the spinal cord in the lower back. The sciatic nerves and their branches extend down through the buttock to the foot. Sciatica can develop when an injured disc in the back presses against a spinal nerve root. Its main symptom is pain, numbness, or weakness that is often worse in the leg or foot than in the back. Sciatica often will improve and go away with time. Early treatment usually includes medicines and exercises to relieve pain. Follow-up care is a key part of your treatment and safety. Be sure to make and go to all appointments, and call your doctor if you are having problems. It's also a good idea to know your test results and keep a list of the medicines you take. How can you care for yourself at home? · Take pain medicines exactly as directed. ? If the doctor gave you a prescription medicine for pain, take it as prescribed. ? If you are not taking a prescription pain medicine, ask your doctor if you can take an over-the-counter medicine. · Use heat or ice to relieve pain. ? To apply heat, put a warm water bottle, heating pad set on low, or warm cloth on your back. Do not go to sleep with a heating pad on your skin. ? To use ice, put ice or a cold pack on the area for 10 to 20 minutes at a time. Put a thin cloth between the ice and your skin. · Avoid sitting if possible, unless it feels better than standing. · Alternate lying down with short walks. Increase your walking distance as you are able to without making your symptoms worse. · Do not do anything that makes your symptoms worse. When should you call for help? Call 911 anytime you think you may need emergency care.  For example, call if:    · You are unable to move a leg at all.   [facility] your doctor now or seek immediate medical care if:    · You have new or worse symptoms in your legs or buttocks. Symptoms may include:  ? Numbness or tingling. ? Weakness. ? Pain.     · You lose bladder or bowel control.    Watch closely for changes in your health, and be sure to contact your doctor if:    · You are not getting better as expected. Where can you learn more? Go to https://[email or web address].unrival. org and sign in to your SingleFeed account. Enter [phone] in the CloudApps box to learn more about \"Sciatica: Care Instructions. \"     If you do not have an account, please click on the \"Sign Up Now\" link. Current as of: September 20, 2018  Content Version: 11.9  © 3276-6770 [employer]. Care instructions adapted under license by [facility] ([facility]). If you have questions about a medical condition or this instruction, always ask your healthcare professional. [address] any warranty or liability for your use of this information.

## 2019-04-02 NOTE — PROGRESS NOTES
laboratory testing, imaging, consultation, and follow up as documented in this encounter. Her anxiety is well controlled. Continue current treatment. Her sciatica has failed conservative management and is worsening. For the acute exacerbation, burst of steroids. I do feel that an MRI is warranted. Additionally, she has been referred to chiropractic care. Check DEXA scan due to postmenopausal state. Further evaluation and management will be dependent upon test results. Otherwise, follow up in our office in 3-6 months or as needed. Curtis Milesmarandy received counseling on the following healthy behaviors: medication adherence    Patient given educational materials on sciatica. Was a self-tracking handout given in paper form or via [employer]? No  If yes, see orders or list here. Discussed use, benefit, and side effects of prescribed medications. Barriers to medication compliance addressed. All patient questions answered. Pt voiced understanding. This note is created with the assistance of a speech-recognition program.  While intending to generate a document that actually reflects the content of the visit, no guarantees can be provided that every mistake has been identified and corrected by editing.

## 2019-04-22 ENCOUNTER — HOSPITAL ENCOUNTER (OUTPATIENT)
Dept: WOMENS IMAGING | Age: 44
Discharge: HOME | End: 2019-04-24
Payer: COMMERCIAL

## 2019-04-22 ENCOUNTER — HOSPITAL ENCOUNTER (OUTPATIENT)
Dept: MRI IMAGING | Age: 44
Discharge: HOME | End: 2019-04-24
Payer: COMMERCIAL

## 2019-04-22 DIAGNOSIS — Z78.0 MENOPAUSE: ICD-10-CM

## 2019-04-22 DIAGNOSIS — M54.32 BILATERAL SCIATICA: ICD-10-CM

## 2019-04-22 DIAGNOSIS — M54.31 BILATERAL SCIATICA: ICD-10-CM

## 2019-04-22 PROCEDURE — 77080 DXA BONE DENSITY AXIAL: CPT

## 2019-04-22 PROCEDURE — 72148 MRI LUMBAR SPINE W/O DYE: CPT

## 2019-04-22 RX ORDER — B-COMPLEX WITH VITAMIN C
1 TABLET ORAL DAILY
Qty: 90 TABLET | Refills: 3 | Status: SHIPPED | OUTPATIENT
Start: 2019-04-22 | End: 2020-05-14

## 2019-04-24 PROBLEM — M47.816 DEGENERATIVE JOINT DISEASE (DJD) OF LUMBAR SPINE: Status: ACTIVE | Noted: 2019-04-24

## 2019-05-06 ENCOUNTER — OFFICE VISIT (OUTPATIENT)
Dept: PRIMARY CARE CLINIC | Age: 44
End: 2019-05-06
Payer: COMMERCIAL

## 2019-05-06 VITALS
HEART RATE: 80 BPM | BODY MASS INDEX: 31.95 KG/M2 | RESPIRATION RATE: 16 BRPM | SYSTOLIC BLOOD PRESSURE: 118 MMHG | WEIGHT: 192 LBS | DIASTOLIC BLOOD PRESSURE: 80 MMHG

## 2019-05-06 DIAGNOSIS — Z13.1 ENCOUNTER FOR SCREENING FOR DIABETES MELLITUS: ICD-10-CM

## 2019-05-06 DIAGNOSIS — M51.36 DEGENERATIVE DISC DISEASE, LUMBAR: Primary | ICD-10-CM

## 2019-05-06 DIAGNOSIS — Z72.0 TOBACCO ABUSE: ICD-10-CM

## 2019-05-06 DIAGNOSIS — G47.00 INSOMNIA, UNSPECIFIED TYPE: ICD-10-CM

## 2019-05-06 DIAGNOSIS — Z13.220 LIPID SCREENING: ICD-10-CM

## 2019-05-06 PROCEDURE — 99214 OFFICE O/P EST MOD 30 MIN: CPT | Performed by: FAMILY MEDICINE

## 2019-05-06 RX ORDER — NICOTINE 21 MG/24HR
1 PATCH, TRANSDERMAL 24 HOURS TRANSDERMAL EVERY 24 HOURS
Qty: 14 PATCH | Refills: 0 | COMMUNITY
Start: 2019-05-06 | End: 2019-07-23

## 2019-05-06 RX ORDER — ALPRAZOLAM 0.25 MG/1
0.25 TABLET ORAL NIGHTLY PRN
Qty: 30 TABLET | Refills: 1 | Status: SHIPPED | OUTPATIENT
Start: 2019-05-06 | End: 2019-07-05

## 2019-05-06 RX ORDER — IBUPROFEN 800 MG/1
800 TABLET ORAL EVERY 8 HOURS PRN
Qty: 90 TABLET | Refills: 0 | Status: SHIPPED | OUTPATIENT
Start: 2019-05-06 | End: 2019-07-23 | Stop reason: SDUPTHER

## 2019-05-06 RX ORDER — NICOTINE 21 MG/24HR
1 PATCH, TRANSDERMAL 24 HOURS TRANSDERMAL EVERY 24 HOURS
Qty: 14 PATCH | Refills: 0 | Status: SHIPPED | OUTPATIENT
Start: 2019-05-20 | End: 2019-07-23

## 2019-05-06 NOTE — PROGRESS NOTES
Visit Information    Have you changed or started any medications since your last visit including any over-the-counter medicines, vitamins, or herbal medicines? no   Have you stopped taking any of your medications? Is so, why? -  no  Are you having any side effects from any of your medications? - no    Have you seen any other physician or provider since your last visit?  no   Have you had any other diagnostic tests since your last visit?  no   Have you been seen in the emergency room and/or had an admission in a hospital since we last saw you?  no   Have you had your routine dental cleaning in the past 6 months?  yes      Do you have an active MyChart account? If no, what is the barrier?   Yes    Patient Care Team:  Tai Sepulveda MD as PCP - General (Family Medicine)    Medical History Review  Past Medical, Family, and Social History reviewed and does contribute to the patient presenting condition    Health Maintenance   Topic Date Due    HIV screen  07/02/1988    Cervical cancer screen  07/02/1994    Diabetes screen  07/02/2013    Lipid screen  08/28/2020    DTaP/Tdap/Td vaccine (2 - Td) 05/28/2024    Flu vaccine  Completed

## 2019-05-06 NOTE — PATIENT INSTRUCTIONS
Patient Education        alprazolam  Pronunciation:  bryon bo  Brand:  Niravam, Xanax, Xanax XR  What is the most important information I should know about alprazolam?  You should not use this medicine if you have narrow-angle glaucoma, if you also take itraconazole or ketoconazole, or if you are allergic to alprazolam or similar medicines (Valium, Ativan, Tranxene, and others). Do not use alprazolam if you are pregnant. This medicine can cause birth defects or life-threatening withdrawal symptoms in a . Alprazolam may be habit-forming. Misuse of habit-forming medicine can cause addiction, overdose, or death. What is alprazolam?  Alprazolam is a benzodiazepine (kiarra-carmina-dye-AZE-eh-thang). Alprazolam affects chemicals in the brain that may be unbalanced in people with anxiety. Alprazolam is used to treat anxiety disorders, panic disorders, and anxiety caused by depression. Alprazolam may also be used for purposes not listed in this medication guide. What should I discuss with my healthcare provider before taking alprazolam?  It is dangerous to purchase alprazolam on the Internet or from vendors outside the [place]. Medications distributed from [employer] may contain dangerous ingredients, or may not be distributed by a licensed pharmacy. The sale and distribution of alprazolam outside the [place] does not comply with the regulations of the Food and Drug Administration (FDA) for the safe use of this medication. You should not take alprazolam if you have:  · narrow-angle glaucoma;  · if you are also taking itraconazole or ketoconazole; or  · if you are allergic to alprazolam or to other benzodiazepines, such as chlordiazepoxide (Librium), clorazepate (Tranxene), diazepam (Valium), lorazepam (Ativan), or oxazepam (Serax).   To make sure alprazolam is safe for you, tell your doctor if you have:  · seizures or epilepsy;  · kidney or liver disease (especially alcoholic liver disease);  · asthma or other breathing disorder;  · open-angle glaucoma;  · a history of depression or suicidal thoughts or behavior;  · a history of drug or alcohol addiction; or  · if you also use a narcotic (opioid) medication. Do not use alprazolam if you are pregnant. This medicine can cause birth defects. Your baby could also become dependent on the drug. This can cause life-threatening withdrawal symptoms in the baby after it is born. Babies born dependent on habit-forming medicine may need medical treatment for several weeks. Tell your doctor if you are pregnant or plan to become pregnant. Use effective birth control to prevent pregnancy while you are taking alprazolam.  Alprazolam can pass into breast milk and may harm a nursing baby. You should not breast-feed while you are using alprazolam.  The sedative effects of alprazolam may last longer in older adults. Accidental falls are common in elderly patients who take benzodiazepines. Use caution to avoid falling or accidental injury while you are taking alprazolam.  Alprazolam is not approved for use by anyone younger than 25years old. How should I take alprazolam?  Follow all directions on your prescription label. Never use alprazolam in larger amounts, or for longer than prescribed. Tell your doctor if the medicine seems to stop working as well in treating your symptoms. Alprazolam may be habit-forming. Never share this medicine with another person, especially someone with a history of drug abuse or addiction. Keep the medication in a place where others cannot get to it. Misuse of habit-forming medicine can cause addiction, overdose, or death. Selling or giving away this medicine is against the law. Do not swallow the orally disintegrating tablet whole. Allow it to dissolve in your mouth without chewing. Do not crush, chew, or break an extended-release tablet. Swallow it whole.   Measure liquid medicine with the dosing syringe provided, or with a special dose-measuring spoon or medicine cup. If you do not have a dose-measuring device, ask your pharmacist for one. Call your doctor if this medicine seems to stop working as well in treating your panic or anxiety symptoms. Do not stop using alprazolam suddenly, or you could have unpleasant withdrawal symptoms. Ask your doctor how to safely stop using alprazolam.  If you use this medicine long-term, you may need frequent medical tests. Store at room temperature away from moisture and heat. Keep track of the amount of medicine used from each new bottle. Alprazolam is a drug of abuse and you should be aware if anyone is using your medicine improperly or without a prescription. What happens if I miss a dose? Take the missed dose as soon as you remember. Skip the missed dose if it is almost time for your next scheduled dose. Do not take extra medicine to make up the missed dose. What happens if I overdose? Seek emergency medical attention or call the Poison Help line at 1-914.105.7187. An overdose of alprazolam can be fatal. Overdose symptoms may include extreme drowsiness, confusion, muscle weakness, loss of balance or coordination, feeling light-headed, and fainting. What should I avoid while taking alprazolam?  Alprazolam may impair your thinking or reactions. Be careful if you drive or do anything that requires you to be alert. Avoid drinking alcohol. Dangerous side effects could occur. Grapefruit and grapefruit juice may interact with alprazolam and lead to unwanted side effects. Discuss the use of grapefruit products with your doctor. What are the possible side effects of alprazolam?  Get emergency medical help if you have signs of an allergic reaction: hives; difficult breathing; swelling of your face, lips, tongue, or throat.   Call your doctor at once if you have:  · depressed mood, thoughts of suicide or hurting yourself;  · racing thoughts, increased energy, unusual risk-taking behavior;  · confusion, agitation, hostility, hallucinations;  · uncontrolled muscle movements, tremor, seizure (convulsions); or  · pounding heartbeats or fluttering in your chest.  Common side effects may include:  · drowsiness, feeling tired;  · slurred speech, lack of balance or coordination;  · memory problems; or  · feeling anxious early in the morning. This is not a complete list of side effects and others may occur. Call your doctor for medical advice about side effects. You may report side effects to FDA at 6-212-UTZ-3698. What other drugs will affect alprazolam?  Taking alprazolam with other drugs that make you sleepy or slow your breathing can cause dangerous side effects or death. Ask your doctor before taking a sleeping pill, narcotic pain medicine, prescription cough medicine, a muscle relaxer, or medicine for anxiety, depression, or seizures. Tell your doctor about all your current medicines and any you start or stop using, especially:  · cimetidine;  · digoxin;  · fluvoxamine;  · nefazodone;  · ritonavir or other medicines to treat HIV or AIDS; or  · antifungal medicine --fluconazole, voriconazole. This list is not complete. Other drugs may interact with alprazolam, including prescription and over-the-counter medicines, vitamins, and herbal products. Not all possible interactions are listed in this medication guide. Where can I get more information? Your pharmacist can provide more information about alprazolam.  Remember, keep this and all other medicines out of the reach of children, never share your medicines with others, and use this medication only for the indication prescribed. Every effort has been made to ensure that the information provided by [address] is accurate, up-to-date, and complete, but no guarantee is made to that effect. Drug information contained herein may be time sensitive.  [employer] information has been compiled for use by healthcare practitioners and consumers in the [place] and therefore [employer] does not warrant that uses outside of the [place] are appropriate, unless specifically indicated otherwise. [facility]'s drug information does not endorse drugs, diagnose patients or recommend therapy. [facility] drug information is an informational resource designed to assist licensed healthcare practitioners in caring for their patients and/or to serve consumers viewing this service as a supplement to, and not a substitute for, the expertise, skill, knowledge and judgment of healthcare practitioners. The absence of a warning for a given drug or drug combination in no way should be construed to indicate that the drug or drug combination is safe, effective or appropriate for any given patient. [facility] does not assume any responsibility for any aspect of healthcare administered with the aid of information [facility] provides. The information contained herein is not intended to cover all possible uses, directions, precautions, warnings, drug interactions, allergic reactions, or adverse effects. If you have questions about the drugs you are taking, check with your doctor, nurse or pharmacist.  Copyright 0464-4362 [address]: 10.05. Revision date: 9/28/2016. Care instructions adapted under license by [facility] [place] ([facility]). If you have questions about a medical condition or this instruction, always ask your healthcare professional. [place] [address] any warranty or liability for your use of this information.

## 2019-05-06 NOTE — PROGRESS NOTES
Subjective:  Susana Morales presents for continued evaluation and management. She has degenerative disc disease of the lumbar spine. Subsequent to her last visit in our office, she has been evaluated by a chiropractor. Additionally, she has had a MRI of her lumbar spine. She has been referred to Dr. Gilford Larger in pain management. She denies any loss of bladder or bowel continence. She denies any loss of perineal sensation. She continues to smoke cigarettes. She smokes approximately one pack per day. She indicates that she is motivated to quit. She is requesting nicotine patches. Finally, she suffers from insomnia. She is previously used alprazolam on an as-needed basis. She is requesting a refill. Review of systems per HPI, otherwise negative. Allergies; medications; past medical, surgical, family, and social history; and problem list reviewed as indicated in this encounter. Objective:  /80   Pulse 80   Resp 16   Wt 192 lb (87.1 kg)   LMP 06/15/2015 (Exact Date)   BMI 31.95 kg/m²     MRI dated April 22, 2019 reviewed and discussed with the patient. Assessment:   Diagnosis Orders   1. Degenerative disc disease, lumbar  External Referral To Neurosurgery    ibuprofen (ADVIL;MOTRIN) 800 MG tablet   2. Tobacco abuse  nicotine (NICODERM CQ) 7 MG/24HR    nicotine (NICODERM CQ) 14 MG/24HR    nicotine (NICODERM CQ) 21 MG/24HR   3. Insomnia, unspecified type  ALPRAZolam (XANAX) 0.25 MG tablet   4. Encounter for screening for diabetes mellitus  Glucose, Fasting   5. Lipid screening  Lipid Panel       Plan:  Medications, laboratory testing, imaging, consultation, and follow up as documented in this encounter. She is interested in neurosurgical evaluation and specifically evaluation by Dr. Helena Dillard. I have recommended nicotine patches to be tapered over a six-week time. She is been advised to not smoke cigarettes while on the patches.   I have recommended judicious use of Xanax to help her sleep. Check screening labs as indicated above. Follow up in our office in approximately 2 months or as needed.

## 2019-07-23 ENCOUNTER — OFFICE VISIT (OUTPATIENT)
Dept: PRIMARY CARE CLINIC | Age: 44
End: 2019-07-23
Payer: COMMERCIAL

## 2019-07-23 VITALS
DIASTOLIC BLOOD PRESSURE: 78 MMHG | RESPIRATION RATE: 15 BRPM | BODY MASS INDEX: 31.85 KG/M2 | SYSTOLIC BLOOD PRESSURE: 116 MMHG | WEIGHT: 191.4 LBS | OXYGEN SATURATION: 94 % | HEART RATE: 71 BPM

## 2019-07-23 DIAGNOSIS — M47.26 OSTEOARTHRITIS OF SPINE WITH RADICULOPATHY, LUMBAR REGION: ICD-10-CM

## 2019-07-23 DIAGNOSIS — Z72.0 TOBACCO ABUSE: ICD-10-CM

## 2019-07-23 DIAGNOSIS — Z76.89 ENCOUNTER TO ESTABLISH CARE WITH NEW DOCTOR: Primary | ICD-10-CM

## 2019-07-23 DIAGNOSIS — Z13.1 DIABETES MELLITUS SCREENING: ICD-10-CM

## 2019-07-23 DIAGNOSIS — M51.36 DEGENERATIVE DISC DISEASE, LUMBAR: ICD-10-CM

## 2019-07-23 DIAGNOSIS — R53.83 FATIGUE, UNSPECIFIED TYPE: ICD-10-CM

## 2019-07-23 DIAGNOSIS — Z00.00 PREVENTATIVE HEALTH CARE: ICD-10-CM

## 2019-07-23 PROCEDURE — 99204 OFFICE O/P NEW MOD 45 MIN: CPT | Performed by: INTERNAL MEDICINE

## 2019-07-23 RX ORDER — VARENICLINE TARTRATE 1 MG/1
TABLET, FILM COATED ORAL
Qty: 60 TABLET | Refills: 2 | Status: SHIPPED | OUTPATIENT
Start: 2019-07-23 | End: 2020-05-14

## 2019-07-23 RX ORDER — IBUPROFEN 800 MG/1
800 TABLET ORAL EVERY 8 HOURS PRN
Qty: 180 TABLET | Refills: 1 | Status: SHIPPED | OUTPATIENT
Start: 2019-07-23 | End: 2020-10-08 | Stop reason: SDUPTHER

## 2019-07-23 RX ORDER — VARENICLINE TARTRATE 0.5 MG/1
TABLET, FILM COATED ORAL
Qty: 95 TABLET | Refills: 0 | Status: SHIPPED | OUTPATIENT
Start: 2019-07-23 | End: 2020-05-14

## 2019-07-26 ASSESSMENT — ENCOUNTER SYMPTOMS
ABDOMINAL DISTENTION: 0
BACK PAIN: 1
DIARRHEA: 0
VOMITING: 0
CONSTIPATION: 0
COUGH: 0
NAUSEA: 0
SINUS PRESSURE: 0
SHORTNESS OF BREATH: 0
ABDOMINAL PAIN: 0
SINUS PAIN: 0
WHEEZING: 0

## 2019-07-27 NOTE — PROGRESS NOTES
HYSTERECTOMY      TONSILLECTOMY         History reviewed. No pertinent family history. Social History     Tobacco Use    Smoking status: Former Smoker     Packs/day: 0.50    Smokeless tobacco: Never Used    Tobacco comment: on Chantix   Substance Use Topics    Alcohol use: Yes     Alcohol/week: 2.0 standard drinks     Types: 2 Cans of beer per week      Current Outpatient Medications   Medication Sig Dispense Refill    varenicline (CHANTIX) 0.5 MG tablet 0.5 mg po q am x 3 days. Then, 0.5 mg bid x 4 days. Then, one 1 mg bid x 15 weeks 95 tablet 0    varenicline (CHANTIX) 1 MG tablet 1mg bid 60 tablet 2    ibuprofen (ADVIL;MOTRIN) 800 MG tablet Take 1 tablet by mouth every 8 hours as needed for Pain 180 tablet 1    PROAIR  (90 Base) MCG/ACT inhaler INHALE 1 TO 2 PUFFS BY MOUTH EVERY 4 HOURS AS NEEDED FOR WHEEZING 9 Inhaler 3    Calcium Carbonate-Vitamin D (OYSTER SHELL CALCIUM/D) 500-200 MG-UNIT TABS Take 1 tablet by mouth daily 90 tablet 3    aspirin 81 MG tablet Take 81 mg by mouth       No current facility-administered medications for this visit. Allergies   Allergen Reactions    Pneumovax [Pneumococcal Polysaccharide Vaccine] Swelling     Localized swelling and facial swelling       Health Maintenance   Topic Date Due    HIV screen  07/02/1988    Diabetes screen  07/02/2013    Cervical cancer screen  07/23/2089 (Originally 7/2/1994)    Flu vaccine (1) 09/01/2019    Lipid screen  08/28/2020    DTaP/Tdap/Td vaccine (2 - Td) 05/28/2024       Subjective:      Review of Systems   Constitutional: Positive for fatigue. Negative for activity change, appetite change, chills and fever. HENT: Negative for congestion, ear pain, hearing loss, nosebleeds, sinus pressure, sinus pain and sneezing. Eyes: Negative for visual disturbance. Respiratory: Negative for cough, shortness of breath and wheezing. Cardiovascular: Negative for chest pain and palpitations.    Gastrointestinal: Negative for abdominal distention, abdominal pain, constipation, diarrhea, nausea and vomiting. Endocrine: Negative for cold intolerance, heat intolerance, polydipsia, polyphagia and polyuria. Genitourinary: Negative for difficulty urinating, menstrual problem, vaginal bleeding and vaginal discharge. Musculoskeletal: Positive for back pain. Negative for joint swelling. Skin: Negative for rash. Neurological: Negative for numbness and headaches. Psychiatric/Behavioral: Negative for sleep disturbance. The patient is not nervous/anxious. All other systems reviewed and are negative. Objective:     Physical Exam   Constitutional: She is oriented to person, place, and time. Vital signs are normal. She appears well-developed and well-nourished. She is active. No distress. HENT:   Head: Normocephalic and atraumatic. Right Ear: Hearing normal.   Left Ear: Hearing normal.   Mouth/Throat: Uvula is midline, oropharynx is clear and moist and mucous membranes are normal.   Eyes: Pupils are equal, round, and reactive to light. Conjunctivae are normal. No scleral icterus. Neck: Normal range of motion, full passive range of motion without pain and phonation normal. Neck supple. No JVD present. No thyroid mass and no thyromegaly present. Cardiovascular: Normal rate, regular rhythm, normal heart sounds and intact distal pulses. Exam reveals no decreased pulses. No murmur heard. Pulses:       Carotid pulses are 2+ on the right side, and 2+ on the left side. Radial pulses are 2+ on the right side, and 2+ on the left side. Pulmonary/Chest: Effort normal and breath sounds normal. No accessory muscle usage. No apnea. No respiratory distress. She has no wheezes. She has no rales. Abdominal: Soft. Bowel sounds are normal. She exhibits no distension. There is no tenderness. Musculoskeletal: Normal range of motion. She exhibits no edema or deformity. Lymphadenopathy:     She has no cervical adenopathy.

## 2019-08-26 ENCOUNTER — HOSPITAL ENCOUNTER (OUTPATIENT)
Age: 44
Discharge: HOME | End: 2019-08-26
Payer: COMMERCIAL

## 2019-08-26 DIAGNOSIS — R53.83 FATIGUE, UNSPECIFIED TYPE: ICD-10-CM

## 2019-08-26 DIAGNOSIS — Z13.1 DIABETES MELLITUS SCREENING: ICD-10-CM

## 2019-08-26 DIAGNOSIS — Z00.00 PREVENTATIVE HEALTH CARE: ICD-10-CM

## 2019-08-26 DIAGNOSIS — E78.2 MIXED HYPERLIPIDEMIA: ICD-10-CM

## 2019-08-26 DIAGNOSIS — R94.31 ABNORMAL EKG: ICD-10-CM

## 2019-08-26 DIAGNOSIS — J45.909 UNCOMPLICATED ASTHMA, UNSPECIFIED ASTHMA SEVERITY, UNSPECIFIED WHETHER PERSISTENT: ICD-10-CM

## 2019-08-26 LAB
ABSOLUTE EOS #: 0.1 K/UL (ref 0–0.44)
ABSOLUTE IMMATURE GRANULOCYTE: <0.03 K/UL (ref 0–0.3)
ABSOLUTE LYMPH #: 2.69 K/UL (ref 1.1–3.7)
ABSOLUTE MONO #: 0.68 K/UL (ref 0.1–1.2)
ALBUMIN SERPL-MCNC: 4.6 G/DL (ref 3.5–5.2)
ALBUMIN/GLOBULIN RATIO: 1.7 (ref 1–2.5)
ALP BLD-CCNC: 77 U/L (ref 35–104)
ALT SERPL-CCNC: 31 U/L (ref 5–33)
ANION GAP SERPL CALCULATED.3IONS-SCNC: 14 MMOL/L (ref 9–17)
AST SERPL-CCNC: 25 U/L
BASOPHILS # BLD: 0 % (ref 0–2)
BASOPHILS ABSOLUTE: <0.03 K/UL (ref 0–0.2)
BILIRUB SERPL-MCNC: 0.35 MG/DL (ref 0.3–1.2)
BUN BLDV-MCNC: 17 MG/DL (ref 6–20)
BUN/CREAT BLD: ABNORMAL (ref 9–20)
CALCIUM SERPL-MCNC: 10.1 MG/DL (ref 8.6–10.4)
CHLORIDE BLD-SCNC: 102 MMOL/L (ref 98–107)
CHOLESTEROL, FASTING: 198 MG/DL
CHOLESTEROL/HDL RATIO: 2.4
CHOLESTEROL/HDL RATIO: 2.5
CHOLESTEROL: 195 MG/DL
CO2: 24 MMOL/L (ref 20–31)
CREAT SERPL-MCNC: 0.59 MG/DL (ref 0.5–0.9)
DIFFERENTIAL TYPE: NORMAL
EOSINOPHILS RELATIVE PERCENT: 1 % (ref 1–4)
ESTIMATED AVERAGE GLUCOSE: 123 MG/DL
GFR AFRICAN AMERICAN: >60 ML/MIN
GFR NON-AFRICAN AMERICAN: >60 ML/MIN
GFR SERPL CREATININE-BSD FRML MDRD: ABNORMAL ML/MIN/{1.73_M2}
GFR SERPL CREATININE-BSD FRML MDRD: ABNORMAL ML/MIN/{1.73_M2}
GLUCOSE BLD-MCNC: 101 MG/DL (ref 70–99)
HBA1C MFR BLD: 5.9 % (ref 4–6)
HCT VFR BLD CALC: 46.6 % (ref 36.3–47.1)
HDLC SERPL-MCNC: 80 MG/DL
HDLC SERPL-MCNC: 80 MG/DL
HEMOGLOBIN: 14.8 G/DL (ref 11.9–15.1)
IMMATURE GRANULOCYTES: 0 %
LDL CHOLESTEROL: 102 MG/DL (ref 0–130)
LDL CHOLESTEROL: 99 MG/DL (ref 0–130)
LYMPHOCYTES # BLD: 31 % (ref 24–43)
MAGNESIUM: 2.2 MG/DL (ref 1.6–2.6)
MCH RBC QN AUTO: 30.5 PG (ref 25.2–33.5)
MCHC RBC AUTO-ENTMCNC: 31.8 G/DL (ref 28.4–34.8)
MCV RBC AUTO: 95.9 FL (ref 82.6–102.9)
MONOCYTES # BLD: 8 % (ref 3–12)
NRBC AUTOMATED: 0 PER 100 WBC
PDW BLD-RTO: 13.6 % (ref 11.8–14.4)
PLATELET # BLD: 297 K/UL (ref 138–453)
PLATELET ESTIMATE: NORMAL
PMV BLD AUTO: 10.1 FL (ref 8.1–13.5)
POTASSIUM SERPL-SCNC: 4.3 MMOL/L (ref 3.7–5.3)
RBC # BLD: 4.86 M/UL (ref 3.95–5.11)
RBC # BLD: NORMAL 10*6/UL
SEG NEUTROPHILS: 60 % (ref 36–65)
SEGMENTED NEUTROPHILS ABSOLUTE COUNT: 5.11 K/UL (ref 1.5–8.1)
SODIUM BLD-SCNC: 140 MMOL/L (ref 135–144)
TOTAL PROTEIN: 7.3 G/DL (ref 6.4–8.3)
TRIGL SERPL-MCNC: 81 MG/DL
TRIGLYCERIDE, FASTING: 80 MG/DL
TSH SERPL DL<=0.05 MIU/L-ACNC: 1.38 MIU/L (ref 0.3–5)
VLDLC SERPL CALC-MCNC: NORMAL MG/DL (ref 1–30)
VLDLC SERPL CALC-MCNC: NORMAL MG/DL (ref 1–30)
WBC # BLD: 8.6 K/UL (ref 3.5–11.3)
WBC # BLD: NORMAL 10*3/UL

## 2019-08-26 PROCEDURE — 80061 LIPID PANEL: CPT

## 2019-08-26 PROCEDURE — 83735 ASSAY OF MAGNESIUM: CPT

## 2019-08-26 PROCEDURE — 85025 COMPLETE CBC W/AUTO DIFF WBC: CPT

## 2019-08-26 PROCEDURE — 80053 COMPREHEN METABOLIC PANEL: CPT

## 2019-08-26 PROCEDURE — 83036 HEMOGLOBIN GLYCOSYLATED A1C: CPT

## 2019-08-26 PROCEDURE — 84443 ASSAY THYROID STIM HORMONE: CPT

## 2019-08-26 PROCEDURE — 36415 COLL VENOUS BLD VENIPUNCTURE: CPT

## 2019-12-02 ENCOUNTER — HOSPITAL ENCOUNTER (OUTPATIENT)
Dept: ULTRASOUND IMAGING | Age: 44
Discharge: HOME | End: 2019-12-04
Payer: COMMERCIAL

## 2019-12-02 ENCOUNTER — HOSPITAL ENCOUNTER (OUTPATIENT)
Dept: GENERAL RADIOLOGY | Age: 44
Discharge: HOME | End: 2019-12-04
Payer: COMMERCIAL

## 2019-12-02 ENCOUNTER — HOSPITAL ENCOUNTER (OUTPATIENT)
Dept: CT IMAGING | Age: 44
Discharge: HOME | End: 2019-12-04
Payer: COMMERCIAL

## 2019-12-02 ENCOUNTER — OFFICE VISIT (OUTPATIENT)
Dept: PRIMARY CARE CLINIC | Age: 44
End: 2019-12-02
Payer: COMMERCIAL

## 2019-12-02 ENCOUNTER — HOSPITAL ENCOUNTER (OUTPATIENT)
Age: 44
Discharge: HOME | End: 2019-12-04
Payer: COMMERCIAL

## 2019-12-02 VITALS
OXYGEN SATURATION: 98 % | BODY MASS INDEX: 32.82 KG/M2 | RESPIRATION RATE: 16 BRPM | DIASTOLIC BLOOD PRESSURE: 70 MMHG | HEART RATE: 89 BPM | SYSTOLIC BLOOD PRESSURE: 110 MMHG | WEIGHT: 197.2 LBS

## 2019-12-02 DIAGNOSIS — M79.662 BILATERAL CALF PAIN: ICD-10-CM

## 2019-12-02 DIAGNOSIS — R06.02 SOB (SHORTNESS OF BREATH): ICD-10-CM

## 2019-12-02 DIAGNOSIS — M79.661 BILATERAL CALF PAIN: ICD-10-CM

## 2019-12-02 DIAGNOSIS — G89.29 CHRONIC PAIN OF RIGHT KNEE: Primary | ICD-10-CM

## 2019-12-02 DIAGNOSIS — M25.461 EFFUSION OF RIGHT KNEE: ICD-10-CM

## 2019-12-02 DIAGNOSIS — M25.561 CHRONIC PAIN OF RIGHT KNEE: Primary | ICD-10-CM

## 2019-12-02 DIAGNOSIS — M25.561 CHRONIC PAIN OF RIGHT KNEE: ICD-10-CM

## 2019-12-02 DIAGNOSIS — Z72.0 TOBACCO ABUSE: ICD-10-CM

## 2019-12-02 DIAGNOSIS — G89.29 CHRONIC PAIN OF RIGHT KNEE: ICD-10-CM

## 2019-12-02 DIAGNOSIS — J41.8 MIXED SIMPLE AND MUCOPURULENT CHRONIC BRONCHITIS (HCC): ICD-10-CM

## 2019-12-02 PROCEDURE — 93971 EXTREMITY STUDY: CPT

## 2019-12-02 PROCEDURE — 99214 OFFICE O/P EST MOD 30 MIN: CPT | Performed by: INTERNAL MEDICINE

## 2019-12-02 PROCEDURE — 73562 X-RAY EXAM OF KNEE 3: CPT

## 2019-12-02 PROCEDURE — 71260 CT THORAX DX C+: CPT

## 2019-12-02 PROCEDURE — 99406 BEHAV CHNG SMOKING 3-10 MIN: CPT | Performed by: INTERNAL MEDICINE

## 2019-12-02 PROCEDURE — 6360000004 HC RX CONTRAST MEDICATION: Performed by: INTERNAL MEDICINE

## 2019-12-02 PROCEDURE — 2580000003 HC RX 258: Performed by: INTERNAL MEDICINE

## 2019-12-02 RX ORDER — 0.9 % SODIUM CHLORIDE 0.9 %
80 INTRAVENOUS SOLUTION INTRAVENOUS ONCE
Status: COMPLETED | OUTPATIENT
Start: 2019-12-02 | End: 2019-12-02

## 2019-12-02 RX ORDER — BUDESONIDE AND FORMOTEROL FUMARATE DIHYDRATE 160; 4.5 UG/1; UG/1
2 AEROSOL RESPIRATORY (INHALATION) 2 TIMES DAILY
Qty: 3 INHALER | Refills: 1 | Status: SHIPPED | OUTPATIENT
Start: 2019-12-02 | End: 2020-09-02 | Stop reason: SDUPTHER

## 2019-12-02 RX ORDER — NICOTINE 21 MG/24HR
1 PATCH, TRANSDERMAL 24 HOURS TRANSDERMAL DAILY
Qty: 14 PATCH | Refills: 5 | Status: SHIPPED | OUTPATIENT
Start: 2019-12-02 | End: 2020-05-14

## 2019-12-02 RX ORDER — SODIUM CHLORIDE 0.9 % (FLUSH) 0.9 %
10 SYRINGE (ML) INJECTION PRN
Status: DISCONTINUED | OUTPATIENT
Start: 2019-12-02 | End: 2019-12-05 | Stop reason: HOSPADM

## 2019-12-02 RX ORDER — BUPROPION HYDROCHLORIDE 150 MG/1
150 TABLET ORAL EVERY MORNING
Qty: 90 TABLET | Refills: 1 | Status: SHIPPED | OUTPATIENT
Start: 2019-12-02 | End: 2020-05-14

## 2019-12-02 RX ADMIN — Medication 10 ML: at 13:35

## 2019-12-02 RX ADMIN — SODIUM CHLORIDE 80 ML: 9 INJECTION, SOLUTION INTRAVENOUS at 13:35

## 2019-12-02 RX ADMIN — IOVERSOL 75 ML: 741 INJECTION INTRA-ARTERIAL; INTRAVENOUS at 13:34

## 2019-12-07 ASSESSMENT — ENCOUNTER SYMPTOMS
NAUSEA: 0
SINUS PRESSURE: 0
COUGH: 0
VOMITING: 0
WHEEZING: 0
CONSTIPATION: 0
SINUS PAIN: 0
ABDOMINAL DISTENTION: 0
ABDOMINAL PAIN: 0
SHORTNESS OF BREATH: 1
BACK PAIN: 0
DIARRHEA: 0

## 2019-12-10 ENCOUNTER — OFFICE VISIT (OUTPATIENT)
Dept: ORTHOPEDIC SURGERY | Age: 44
End: 2019-12-10
Payer: COMMERCIAL

## 2019-12-10 VITALS — WEIGHT: 197 LBS | BODY MASS INDEX: 32.82 KG/M2 | HEIGHT: 65 IN

## 2019-12-10 DIAGNOSIS — S83.281A TEAR OF LATERAL MENISCUS OF RIGHT KNEE, CURRENT, UNSPECIFIED TEAR TYPE, INITIAL ENCOUNTER: Primary | ICD-10-CM

## 2019-12-10 PROCEDURE — 99203 OFFICE O/P NEW LOW 30 MIN: CPT | Performed by: PHYSICIAN ASSISTANT

## 2019-12-10 RX ORDER — DICLOFENAC SODIUM 75 MG/1
75 TABLET, DELAYED RELEASE ORAL 2 TIMES DAILY WITH MEALS
Qty: 28 TABLET | Refills: 0 | Status: SHIPPED | OUTPATIENT
Start: 2019-12-10 | End: 2020-10-08 | Stop reason: ALTCHOICE

## 2019-12-10 ASSESSMENT — ENCOUNTER SYMPTOMS
RHINORRHEA: 0
COUGH: 0
VOMITING: 0
COLOR CHANGE: 0
NAUSEA: 0
EYES NEGATIVE: 1

## 2019-12-31 ENCOUNTER — HOSPITAL ENCOUNTER (OUTPATIENT)
Dept: MRI IMAGING | Age: 44
Discharge: HOME | End: 2020-01-02
Payer: COMMERCIAL

## 2019-12-31 PROCEDURE — 73721 MRI JNT OF LWR EXTRE W/O DYE: CPT

## 2020-01-03 ENCOUNTER — TELEPHONE (OUTPATIENT)
Dept: ORTHOPEDIC SURGERY | Age: 45
End: 2020-01-03

## 2020-01-03 NOTE — TELEPHONE ENCOUNTER
Spoke with patient r/t MRI results, and informed her that there is no ligament, or meniscal tears noted. Patient reports \"I am taking those pills he gave me and Im pain free right now\" patient does not wish to come in for a troch injection, or try PT at this time as she is feeling better, but she will call if anything changes.

## 2020-01-03 NOTE — TELEPHONE ENCOUNTER
----- Message from Anila [place] sent at 1/2/2020  8:23 AM EST -----  No evidence of meniscal or ligamentous tear.  She did have component of trochanteric bursitis please offer appointment for Troch injection vs. Referral to physical therapy with follow up after

## 2020-03-16 RX ORDER — ALBUTEROL SULFATE 90 UG/1
AEROSOL, METERED RESPIRATORY (INHALATION)
Qty: 18 G | Refills: 0 | Status: SHIPPED | OUTPATIENT
Start: 2020-03-16 | End: 2020-05-21

## 2020-03-16 NOTE — TELEPHONE ENCOUNTER
Last OV 12/02/2019    Health Maintenance   Topic Date Due    HIV screen  07/02/1988    Cervical cancer screen  07/23/2089 (Originally 7/2/1994)    A1C test (Diabetic or Prediabetic)  08/26/2020    Shingles Vaccine (1 of 2) 07/02/2023    DTaP/Tdap/Td vaccine (2 - Td) 05/28/2024    Lipid screen  08/26/2024    Flu vaccine  Completed    Hepatitis A vaccine  Aged Out    Hepatitis B vaccine  Aged Out    Hib vaccine  Aged Out    Meningococcal (ACWY) vaccine  Aged Out             (applicable per patient's age: Cancer Screenings, Depression Screening, Fall Risk Screening, Immunizations)    Hemoglobin A1C (%)   Date Value   08/26/2019 5.9     LDL Cholesterol (mg/dL)   Date Value   08/26/2019 99     LDL Calculated (mg/dL)   Date Value   08/28/2015 81     AST (U/L)   Date Value   08/26/2019 25     ALT (U/L)   Date Value   08/26/2019 31     BUN (mg/dL)   Date Value   08/26/2019 17      (goal A1C is < 7)   (goal LDL is <100) need 30-50% reduction from baseline     BP Readings from Last 3 Encounters:   12/02/19 110/70   10/22/19 118/72   07/23/19 116/78    (goal /80)      All Future Testing planned in [facility]:  Lab Frequency Next Occurrence   Glucose, Fasting Once 05/06/2020       Next Visit Date:  Future Appointments   Date Time Provider Manjit Motta   10/26/2020 12:15 PM MD ANABEL Guerrero            Patient Active Problem List:     Asthma     Tobacco abuse     Hypercoagulable state ([address]     Left knee pain     Anxiety     Mixed hyperlipidemia     Murmur     Degenerative joint disease (DJD) of lumbar spine

## 2020-05-14 ENCOUNTER — HOSPITAL ENCOUNTER (EMERGENCY)
Age: 45
Discharge: HOME | End: 2020-05-14
Attending: EMERGENCY MEDICINE
Payer: COMMERCIAL

## 2020-05-14 ENCOUNTER — APPOINTMENT (OUTPATIENT)
Dept: GENERAL RADIOLOGY | Age: 45
End: 2020-05-14
Payer: COMMERCIAL

## 2020-05-14 VITALS
SYSTOLIC BLOOD PRESSURE: 129 MMHG | WEIGHT: 211.56 LBS | TEMPERATURE: 98.2 F | HEART RATE: 91 BPM | DIASTOLIC BLOOD PRESSURE: 80 MMHG | HEIGHT: 65 IN | OXYGEN SATURATION: 97 % | RESPIRATION RATE: 16 BRPM | BODY MASS INDEX: 35.25 KG/M2

## 2020-05-14 PROCEDURE — 73110 X-RAY EXAM OF WRIST: CPT

## 2020-05-14 PROCEDURE — 99283 EMERGENCY DEPT VISIT LOW MDM: CPT

## 2020-05-14 RX ORDER — IBUPROFEN 800 MG/1
800 TABLET ORAL EVERY 8 HOURS PRN
Qty: 20 TABLET | Refills: 0 | Status: SHIPPED | OUTPATIENT
Start: 2020-05-14 | End: 2020-10-08 | Stop reason: SDUPTHER

## 2020-05-14 ASSESSMENT — PAIN SCALES - GENERAL: PAINLEVEL_OUTOF10: 8

## 2020-05-14 ASSESSMENT — ENCOUNTER SYMPTOMS: COLOR CHANGE: 1

## 2020-05-15 ENCOUNTER — CARE COORDINATION (OUTPATIENT)
Dept: CARE COORDINATION | Age: 45
End: 2020-05-15

## 2020-05-15 NOTE — ED NOTES
Pt refused ibuprofen script, states she has plenty at home. Script torn up and placed in HIPAA bin.      Dulce Louie RN  05/15/20 [id number]

## 2020-05-15 NOTE — ED PROVIDER NOTES
[address] ED  EMERGENCY DEPARTMENT ENCOUNTER      Pt Name: Stacy Louie  MRN: [id number]  [place] 1975  Date of evaluation: 5/14/2020  Provider: Dae Wood       Chief Complaint   Patient presents with    Fall    Wrist Injury         HISTORY OFPRESENT ILLNESS  (Location/Symptom, Timing/Onset, Context/Setting, Quality, Duration, Modifying Factors, Severity.)   Stacy Louie is a 53 y.o. female who presents to the emergency department for evaluation of left wrist pain after she fell 3 feet off a ladder today. Rates her pain 8 out of 10. Denies other injury. Nursing Notes were reviewed. PASTMEDICAL HISTORY     Past Medical History:   Diagnosis Date    Acute deep vein thrombosis (DVT) of right lower extremity ([address] 2/16/2017    Acute internal jugular vein embolism ([address] 2/16/2017    Correspondence dated February 21 2017 from [facility] reviewed on 2/22/2017, see Media tab of Chart Review for more information. Correspondence dated July 19, 2017 from Dr. Charlie Drake reviewed on 7/20/17, see Media tab of Chart Review for more information.  Correspondence dated August 16, 2017 from Dr. Charlie Drake reviewed on 8/18/2017, see Media tab of Chart Review for more information    Asthma     Bronchitis     DVT (deep venous thrombosis) ([address]          SURGICAL HISTORY       Past Surgical History:   Procedure Laterality Date    APPENDECTOMY      HYSTERECTOMY      TONSILLECTOMY           CURRENT MEDICATIONS     Discharge Medication List as of 5/14/2020  9:04 PM      CONTINUE these medications which have NOT CHANGED    Details   albuterol sulfate  (90 Base) MCG/ACT inhaler INHALE 1 TO 2 PUFFS BY MOUTH EVERY 4 HOURS AS NEEDED FOR WHEEZING, Disp-18 g, R-0Normal      budesonide-formoterol (SYMBICORT) 160-4.5 MCG/ACT AERO Inhale 2 puffs into the lungs 2 times daily, Disp-3 Inhaler, R-1Normal      tiotropium (SPIRIVA HANDIHALER) 18 MCG inhalation capsule Inhale 1 capsule into the

## 2020-05-15 NOTE — CARE COORDINATION
for worsening symptoms. Pt will be further monitored by COVID Loop Team based on severity of symptoms and risk factors. Advance Care Planning  People with COVID-19 may have no symptoms, mild symptoms, such as fever, cough, and shortness of breath or they may have more severe illness, developing severe and fatal pneumonia. As a result, Advance Care Planning with attention to naming a health care decision maker (someone you trust to make healthcare decisions for you if you could not speak for yourself) and sharing other health care preferences is important BEFORE a possible health crisis. Please contact your Primary Care Provider to discuss Advance Care Planning. Preventing the Spread of Coronavirus Disease 2019 in Homes and Residential Communities  For the most recent information go to [email or web address]    Prevention steps for People with confirmed or suspected COVID-19 (including persons under investigation) who do not need to be hospitalized  and   People with confirmed COVID-19 who were hospitalized and determined to be medically stable to go home    Your healthcare provider and public health staff will evaluate whether you can be cared for at home. If it is determined that you do not need to be hospitalized and can be isolated at home, you will be monitored by staff from your local or state health department. You should follow the prevention steps below until a healthcare provider or local or state health department says you can return to your normal activities. Stay home except to get medical care  People who are mildly ill with COVID-19 are able to isolate at home during their illness. You should restrict activities outside your home, except for getting medical care. Do not go to work, school, or public areas. Avoid using public transportation, ride-sharing, or taxis. Separate yourself from other people and animals in your home  People:  As and water for at least 20 seconds, especially after blowing your nose, coughing, or sneezing; going to the bathroom; and before eating or preparing food. If soap and water are not readily available, use an alcohol-based hand  with at least 60% alcohol, covering all surfaces of your hands and rubbing them together until they feel dry. Soap and water are the best option if hands are visibly dirty. Avoid touching your eyes, nose, and mouth with unwashed hands. Avoid sharing personal household items  You should not share dishes, drinking glasses, cups, eating utensils, towels, or bedding with other people or pets in your home. After using these items, they should be washed thoroughly with soap and water. Clean all high-touch surfaces everyday  High touch surfaces include counters, tabletops, doorknobs, bathroom fixtures, toilets, phones, keyboards, tablets, and bedside tables. Also, clean any surfaces that may have blood, stool, or body fluids on them. Use a household cleaning spray or wipe, according to the label instructions. Labels contain instructions for safe and effective use of the cleaning product including precautions you should take when applying the product, such as wearing gloves and making sure you have good ventilation during use of the product. Monitor your symptoms  Seek prompt medical attention if your illness is worsening (e.g., difficulty breathing). Before seeking care, call your healthcare provider and tell them that you have, or are being evaluated for, COVID-19. Put on a facemask before you enter the facility. These steps will help the healthcare providers office to keep other people in the office or waiting room from getting infected or exposed. Ask your healthcare provider to call the local or state health department.  Persons who are placed under active monitoring or facilitated self-monitoring should follow instructions provided by their local health department or occupational

## 2020-05-21 RX ORDER — ALBUTEROL SULFATE 90 UG/1
AEROSOL, METERED RESPIRATORY (INHALATION)
Qty: 18 G | Refills: 0 | Status: SHIPPED | OUTPATIENT
Start: 2020-05-21 | End: 2020-08-17

## 2020-05-29 ENCOUNTER — TELEMEDICINE (OUTPATIENT)
Dept: PRIMARY CARE CLINIC | Age: 45
End: 2020-05-29
Payer: COMMERCIAL

## 2020-05-29 PROCEDURE — 99213 OFFICE O/P EST LOW 20 MIN: CPT | Performed by: INTERNAL MEDICINE

## 2020-05-29 RX ORDER — ALBUTEROL SULFATE 90 UG/1
90 AEROSOL, METERED RESPIRATORY (INHALATION) PRN
COMMUNITY
Start: 2019-10-14 | End: 2020-05-29 | Stop reason: CLARIF

## 2020-05-29 RX ORDER — VARENICLINE TARTRATE 0.5 MG/1
TABLET, FILM COATED ORAL
Qty: 95 TABLET | Refills: 0 | Status: SHIPPED | OUTPATIENT
Start: 2020-05-29 | End: 2020-10-08

## 2020-05-29 ASSESSMENT — PATIENT HEALTH QUESTIONNAIRE - PHQ9
SUM OF ALL RESPONSES TO PHQ QUESTIONS 1-9: 0
SUM OF ALL RESPONSES TO PHQ9 QUESTIONS 1 & 2: 0
1. LITTLE INTEREST OR PLEASURE IN DOING THINGS: 0
2. FEELING DOWN, DEPRESSED OR HOPELESS: 0
SUM OF ALL RESPONSES TO PHQ QUESTIONS 1-9: 0

## 2020-05-29 NOTE — TELEPHONE ENCOUNTER
Patient requests a medication refill sent to [facility] on [address]. Also, patient requests a return to work note from a sprain wrist. Patient states she has been off work for 2 weeks and has 1 more week to wear a brace. Patient requests for return to work note with no lifting restrictions for Monday 6/01/20. Patient would like the note emailed to her at [email or web address] if possible. Please call patient with an update.      Last OV: 12/02/19

## 2020-05-29 NOTE — TELEPHONE ENCOUNTER
Called patient and left VM to provide more details on this work note, need to know who took her off originally and then we can check with Dr. Ish Schaefer on getting a note for her RTW but she may need an appt scheduled.

## 2020-06-05 NOTE — PROGRESS NOTES
[facility] PRIMARY CARE  [address] [facility] [address]  [address] [address]  Dept: [phone]  Dept Fax: [phone]    Deven Ibrahim is a 53 y.o. female who presents today for her medical conditions/complaints as noted below. Chief Complaint   Patient presents with    Letter for School/Work       HPI:     This is a 42-year-old female who is here for a letter for work since she had sprain to her left wrist.  She is currently wearing a splint. Advised her to come in person as cannot assess her functionality and range of movement through virtual visit. The method of visit - audio and video    Patient consents to perform a telehealth service    The location of the provider - office ; patient during the visit - home   List of all participants- Larry Flores MD and Omer Azevedo        Hemoglobin A1C (%)   Date Value   2019 5.9             ( goal A1C is < 7)   No results found for: LABMICR  LDL Cholesterol (mg/dL)   Date Value   2019 99   2019 102     LDL Calculated (mg/dL)   Date Value   2015 81       (goal LDL is <100)   AST (U/L)   Date Value   2019 25     ALT (U/L)   Date Value   2019 31     BUN (mg/dL)   Date Value   2019 17     BP Readings from Last 3 Encounters:   20 129/80   19 110/70   10/22/19 118/72          (goal 120/80)    Past Medical History:   Diagnosis Date    Acute deep vein thrombosis (DVT) of right lower extremity ([address] 2017    Acute internal jugular vein embolism ([address] 2017    Cor[facility]ence dated 2017 [facility] reviewed on 2017, see Media tab of Chart Review for more information. Correspondence dated 2017 from Dr. Mei Mortensen reviewed on 17, see Media tab of Chart Review for more information.  Correspondence dated 2017 from Dr. Mei Mortensen reviewed on 2017, see Media tab of Chart Review for more information    Asthma     Bronchitis     DVT (deep venous thrombosis) [address]       Past Surgical History:   Procedure Laterality Date    APPENDECTOMY      HYSTERECTOMY      TONSILLECTOMY         History reviewed. No pertinent family history. Social History     Tobacco Use    Smoking status: Current Every Day Smoker     Packs/day: 1.00     Years: 30.00     Pack years: 30.00     Types: Cigarettes    Smokeless tobacco: Never Used    Tobacco comment: on Chantix   Substance Use Topics    Alcohol use: Yes     Alcohol/week: 2.0 standard drinks     Types: 2 Cans of beer per week      Current Outpatient Medications   Medication Sig Dispense Refill    varenicline (CHANTIX) 0.5 MG tablet 0.5 mg po q am x 3 days. Then, 0.5 mg bid x 4 days. Then, one 1 mg bid x 15 weeks 95 tablet 0    albuterol sulfate  (90 Base) MCG/ACT inhaler INHALE 1 TO 2 PUFFS BY MOUTH EVERY 4 HOURS AS NEEDED FOR WHEEZING 18 g 0    ibuprofen (ADVIL;MOTRIN) 800 MG tablet Take 1 tablet by mouth every 8 hours as needed for Pain (Patient not taking: Reported on 5/29/2020) 20 tablet 0    diclofenac (VOLTAREN) 75 MG EC tablet Take 1 tablet by mouth 2 times daily (with meals) for 14 days 28 tablet 0    budesonide-formoterol (SYMBICORT) 160-4.5 MCG/ACT AERO Inhale 2 puffs into the lungs 2 times daily 3 Inhaler 1    tiotropium (SPIRIVA HANDIHALER) 18 MCG inhalation capsule Inhale 1 capsule into the lungs daily 90 capsule 1    ibuprofen (ADVIL;MOTRIN) 800 MG tablet Take 1 tablet by mouth every 8 hours as needed for Pain 180 tablet 1    aspirin 81 MG tablet Take 81 mg by mouth       No current facility-administered medications for this visit.       Allergies   Allergen Reactions    Pneumovax [Pneumococcal Polysaccharide Vaccine] Swelling     Localized swelling and facial swelling    Prevnar 13  [Pneumococcal 13-Andria Conj Vacc] Swelling       Health Maintenance   Topic Date Due    HIV screen  07/02/1988    Cervical cancer screen  07/23/2089 (Originally 7/2/1994)    A1C test (Diabetic or Prediabetic)  08/26/2020    DTaP/Tdap/Td vaccine (2 - Td) 05/28/2024    Lipid screen  08/26/2024    Flu vaccine  Completed    Hepatitis A vaccine  Aged Out    Hepatitis B vaccine  Aged Out    Hib vaccine  Aged Out    Meningococcal (ACWY) vaccine  Aged Out       Subjective:      Review of Systems   Musculoskeletal: Negative for arthralgias (Left wrist pain resolved). All other systems reviewed and are negative. Objective:     Physical Exam  Vitals signs and nursing note reviewed. Constitutional:       General: She is not in acute distress. Appearance: Normal appearance. HENT:      Head: Normocephalic and atraumatic. Nose: Nose normal.   Eyes:      General: No scleral icterus. Conjunctiva/sclera: Conjunctivae normal.      Pupils: Pupils are equal, round, and reactive to light. Neck:      Musculoskeletal: Normal range of motion. No neck rigidity. Pulmonary:      Effort: Pulmonary effort is normal. No respiratory distress. Chest:      Chest wall: No tenderness. Abdominal:      General: There is no distension. Musculoskeletal: Normal range of motion. General: No swelling or deformity. Skin:     Coloration: Skin is not jaundiced or pale. Findings: No erythema or rash. Neurological:      General: No focal deficit present. Mental Status: She is alert and oriented to person, place, and time. Mental status is at baseline. Psychiatric:         Mood and Affect: Mood normal.         Behavior: Behavior normal.         Thought Content: Thought content normal.       LMP 06/15/2015 (Exact Date)     Assessment:          1. Sprain of left wrist, subsequent encounter  Advised to wear the splint            Diagnosis Orders   1. Sprain of left wrist, subsequent encounter             Plan:      No follow-ups on file. No orders of the defined types were placed in this encounter. No orders of the defined types were placed in this encounter.         Patient

## 2020-08-17 RX ORDER — ALBUTEROL SULFATE 90 UG/1
AEROSOL, METERED RESPIRATORY (INHALATION)
Qty: 18 G | Refills: 0 | Status: SHIPPED | OUTPATIENT
Start: 2020-08-17 | End: 2020-09-02 | Stop reason: SDUPTHER

## 2020-09-01 ENCOUNTER — TELEPHONE (OUTPATIENT)
Dept: PRIMARY CARE CLINIC | Age: 45
End: 2020-09-01

## 2020-09-01 NOTE — TELEPHONE ENCOUNTER
Pt called in and would like to speak with Dr. Gennaro Bradley MA about her albuterol inhaler. Pt states she continuously get's generic inhalers that the cases break on them & would like for possibly something else be called in or maybe she can choose a different pharmacy.

## 2020-09-02 RX ORDER — BUDESONIDE AND FORMOTEROL FUMARATE DIHYDRATE 160; 4.5 UG/1; UG/1
2 AEROSOL RESPIRATORY (INHALATION) 2 TIMES DAILY
Qty: 3 INHALER | Refills: 1 | Status: SHIPPED | OUTPATIENT
Start: 2020-09-02 | End: 2021-03-02

## 2020-09-02 RX ORDER — ALBUTEROL SULFATE 90 UG/1
AEROSOL, METERED RESPIRATORY (INHALATION)
Qty: 18 G | Refills: 2 | Status: SHIPPED | OUTPATIENT
Start: 2020-09-02 | End: 2020-10-08 | Stop reason: SDUPTHER

## 2020-09-02 NOTE — TELEPHONE ENCOUNTER
Last OV 05/29/2020      Writer spoke with patient, patient states that she just filled her albuterol inhaler last week and the mechanism for the inhaler is already broken. She said this has been happening ever since her insurance refused to pay for the brand name of ProAir. She is just asked for a refill of the inhaler to be sent to [facility] of [facility] because she believes that this is [facility]'s issue with their . Writer informed patient that her insurance may not cover another inhaler but that a message will be sent to her PCP and if refilled, she can contact her insurance company if they refuse to cover. Patient verbalized understanding. She also asked for a refill of her symbicort. Both refills pending.

## 2020-10-08 ENCOUNTER — OFFICE VISIT (OUTPATIENT)
Dept: PRIMARY CARE CLINIC | Age: 45
End: 2020-10-08
Payer: COMMERCIAL

## 2020-10-08 VITALS
BODY MASS INDEX: 34.05 KG/M2 | WEIGHT: 204.6 LBS | RESPIRATION RATE: 16 BRPM | HEART RATE: 83 BPM | DIASTOLIC BLOOD PRESSURE: 82 MMHG | SYSTOLIC BLOOD PRESSURE: 112 MMHG | OXYGEN SATURATION: 99 %

## 2020-10-08 PROCEDURE — 99406 BEHAV CHNG SMOKING 3-10 MIN: CPT | Performed by: INTERNAL MEDICINE

## 2020-10-08 PROCEDURE — 99214 OFFICE O/P EST MOD 30 MIN: CPT | Performed by: INTERNAL MEDICINE

## 2020-10-08 RX ORDER — ALBUTEROL SULFATE 90 UG/1
AEROSOL, METERED RESPIRATORY (INHALATION)
Qty: 18 G | Refills: 2 | Status: SHIPPED | OUTPATIENT
Start: 2020-10-08 | End: 2021-01-26 | Stop reason: SDUPTHER

## 2020-10-08 RX ORDER — IBUPROFEN 800 MG/1
800 TABLET ORAL EVERY 8 HOURS PRN
Qty: 180 TABLET | Refills: 1 | Status: SHIPPED | OUTPATIENT
Start: 2020-10-08 | End: 2021-10-12

## 2020-10-08 NOTE — PROGRESS NOTES
Current Every Day Smoker     Packs/day: 1.00     Years: 30.00     Pack years: 30.00     Types: Cigarettes    Smokeless tobacco: Never Used    Tobacco comment: on Chantix   Substance Use Topics    Alcohol use: Yes     Alcohol/week: 2.0 standard drinks     Types: 2 Cans of beer per week      Current Outpatient Medications   Medication Sig Dispense Refill    ibuprofen (ADVIL;MOTRIN) 800 MG tablet Take 1 tablet by mouth every 8 hours as needed for Pain 180 tablet 1    albuterol sulfate  (90 Base) MCG/ACT inhaler Inhale 1 to 2 puffs by mouth every 4 hours as needed for wheezing 18 g 2    budesonide-formoterol (SYMBICORT) 160-4.5 MCG/ACT AERO Inhale 2 puffs into the lungs 2 times daily 3 Inhaler 1    aspirin 81 MG tablet Take 81 mg by mouth       No current facility-administered medications for this visit. Allergies   Allergen Reactions    Pneumovax [Pneumococcal Polysaccharide Vaccine] Swelling     Localized swelling and facial swelling    Prevnar 13  [Pneumococcal 13-Andria Conj Vacc] Swelling       Health Maintenance   Topic Date Due    HIV screen  07/02/1988    A1C test (Diabetic or Prediabetic)  08/26/2020    Flu vaccine (1) 10/08/2021 (Originally 9/1/2020)    Cervical cancer screen  07/23/2089 (Originally 7/2/1994)    DTaP/Tdap/Td vaccine (2 - Td) 05/28/2024    Lipid screen  08/26/2024    Hepatitis A vaccine  Aged Out    Hepatitis B vaccine  Aged Out    Hib vaccine  Aged Out    Meningococcal (ACWY) vaccine  Aged Out       Subjective:      Review of Systems   Constitutional: Negative for activity change, appetite change, chills, fatigue and fever. HENT: Negative for congestion, ear pain, hearing loss, nosebleeds, sinus pressure, sinus pain and sneezing. Eyes: Negative for visual disturbance. Respiratory: Negative for cough, shortness of breath and wheezing. Cardiovascular: Negative for chest pain and palpitations.    Gastrointestinal: Negative for abdominal distention, abdominal pain, constipation, diarrhea, nausea and vomiting. Endocrine: Negative for cold intolerance, heat intolerance, polydipsia, polyphagia and polyuria. Genitourinary: Negative for difficulty urinating, menstrual problem, vaginal bleeding and vaginal discharge. Musculoskeletal: Positive for back pain. Negative for joint swelling. Skin: Negative for rash. Neurological: Negative for numbness and headaches. Psychiatric/Behavioral: Negative for sleep disturbance. The patient is not nervous/anxious. All other systems reviewed and are negative. Objective:     Physical Exam  Vitals signs and nursing note reviewed. Constitutional:       General: She is not in acute distress. Appearance: She is well-developed. She is not diaphoretic. HENT:      Head: Normocephalic and atraumatic. Right Ear: Hearing normal.      Left Ear: Hearing normal.      Mouth/Throat:      Pharynx: Uvula midline. Eyes:      General: No scleral icterus. Conjunctiva/sclera: Conjunctivae normal.      Pupils: Pupils are equal, round, and reactive to light. Neck:      Musculoskeletal: Full passive range of motion without pain, normal range of motion and neck supple. Thyroid: No thyroid mass or thyromegaly. Vascular: No JVD. Trachea: Phonation normal.   Cardiovascular:      Rate and Rhythm: Normal rate and regular rhythm. Pulses: No decreased pulses. Carotid pulses are 2+ on the right side and 2+ on the left side. Radial pulses are 2+ on the right side and 2+ on the left side. Heart sounds: Murmur present. Pulmonary:      Effort: Pulmonary effort is normal. No accessory muscle usage or respiratory distress. Breath sounds: Normal breath sounds. No wheezing or rales. Abdominal:      General: Bowel sounds are normal. There is no distension. Palpations: Abdomen is soft. Tenderness: There is no abdominal tenderness. Musculoskeletal: Normal range of motion. General: No deformity. Lymphadenopathy:      Cervical: No cervical adenopathy. Skin:     General: Skin is warm. Capillary Refill: Capillary refill takes less than 2 seconds. Findings: No rash. Neurological:      Mental Status: She is alert and oriented to person, place, and time. Deep Tendon Reflexes: Reflexes normal.   Psychiatric:         Behavior: Behavior normal.       /82   Pulse 83   Resp 16   Wt 204 lb 9.6 oz (92.8 kg)   LMP 06/15/2015 (Exact Date)   SpO2 99%   BMI 34.05 kg/m²     Assessment:          1. Degenerative disc disease, lumbar    - ibuprofen (ADVIL;MOTRIN) 800 MG tablet; Take 1 tablet by mouth every 8 hours as needed for Pain  Dispense: 180 tablet; Refill: 1    2. Mixed simple and mucopurulent chronic bronchitis (HCC)    - albuterol sulfate  (90 Base) MCG/ACT inhaler; Inhale 1 to 2 puffs by mouth every 4 hours as needed for wheezing  Dispense: 18 g; Refill: 2    3. Murmur    - External Referral To Cardiology    4. Breast cancer screening by mammogram    - [facility] DIGITAL SCREEN W OR WO CAD BILATERAL; Future    5. Smoker  Advised to quit smoking             Diagnosis Orders   1. Degenerative disc disease, lumbar  ibuprofen (ADVIL;MOTRIN) 800 MG tablet   2. Mixed simple and mucopurulent chronic bronchitis (HCC)  albuterol sulfate  (90 Base) MCG/ACT inhaler   3. Murmur  External Referral To Cardiology   4. Breast cancer screening by mammogram  [facility] DIGITAL SCREEN W OR WO CAD BILATERAL   5. Smoker             Plan:      Return in about 3 months (around 1/8/2021) for Routine follow-up.     Orders Placed This Encounter   Procedures    [facility] DIGITAL SCREEN W OR WO CAD BILATERAL     Standing Status:   Future     Standing Expiration Date:   10/8/2021     Order Specific Question:   Reason for exam:     Answer:   screening    External Referral To Cardiology     Referral Priority:   Routine     Referral Type:   Eval and Treat     Referral Reason:   Specialty Services Required     Requested Specialty:   Cardiology     Number of Visits Requested:   1     Orders Placed This Encounter   Medications    ibuprofen (ADVIL;MOTRIN) 800 MG tablet     Sig: Take 1 tablet by mouth every 8 hours as needed for Pain     Dispense:  180 tablet     Refill:  1    albuterol sulfate  (90 Base) MCG/ACT inhaler     Sig: Inhale 1 to 2 puffs by mouth every 4 hours as needed for wheezing     Dispense:  18 g     Refill:  2         Patient given educational materials - see patient instructions. Discussed use, benefit, and side effects of prescribedmedications. All patient questions answered. Pt voiced understanding. Reviewed health maintenance. Instructed to continue current medications, diet and exercise. Patient agreed with treatment plan. Follow up as directed. I spent a total of 30 minutes face to face with this patient. Over 50% of that time was spent on counseling and care coordination. Please see assessment and plan for details. Electronically signed by Gabriel Schafer MD on 10/15/2020 at 1:03 AM      Please note that this chart was generated using voice recognition Dragon dictation software. Although every effort was made to ensure the accuracy of this automatedtranscription, some errors in transcription may have occurred. mammog

## 2020-10-15 PROBLEM — D25.9 UTERINE LEIOMYOMA: Status: ACTIVE | Noted: 2017-05-04

## 2020-10-15 PROBLEM — M46.1 SACROILIITIS, NOT ELSEWHERE CLASSIFIED (HCC): Status: ACTIVE | Noted: 2020-10-15

## 2020-10-15 PROBLEM — M51.26 DISPLACEMENT OF LUMBAR INTERVERTEBRAL DISC WITHOUT MYELOPATHY: Status: ACTIVE | Noted: 2020-10-15

## 2020-10-15 PROBLEM — I82.C29: Status: ACTIVE | Noted: 2017-07-19

## 2020-10-15 ASSESSMENT — ENCOUNTER SYMPTOMS
WHEEZING: 0
COUGH: 0
DIARRHEA: 0
SHORTNESS OF BREATH: 0
NAUSEA: 0
BACK PAIN: 1
ABDOMINAL PAIN: 0
SINUS PAIN: 0
VOMITING: 0
SINUS PRESSURE: 0
CONSTIPATION: 0
ABDOMINAL DISTENTION: 0

## 2020-11-25 ENCOUNTER — HOSPITAL ENCOUNTER (OUTPATIENT)
Dept: WOMENS IMAGING | Age: 45
Discharge: HOME | End: 2020-11-27
Payer: COMMERCIAL

## 2020-11-25 PROCEDURE — 77063 BREAST TOMOSYNTHESIS BI: CPT

## 2021-01-07 ENCOUNTER — OFFICE VISIT (OUTPATIENT)
Dept: PRIMARY CARE CLINIC | Age: 46
End: 2021-01-07
Payer: COMMERCIAL

## 2021-01-07 VITALS
HEART RATE: 88 BPM | TEMPERATURE: 97 F | BODY MASS INDEX: 35.05 KG/M2 | SYSTOLIC BLOOD PRESSURE: 110 MMHG | OXYGEN SATURATION: 98 % | DIASTOLIC BLOOD PRESSURE: 74 MMHG | RESPIRATION RATE: 18 BRPM | WEIGHT: 210.6 LBS

## 2021-01-07 DIAGNOSIS — Z00.00 ANNUAL PHYSICAL EXAM: Primary | ICD-10-CM

## 2021-01-07 DIAGNOSIS — F17.200 SMOKER: ICD-10-CM

## 2021-01-07 DIAGNOSIS — F90.2 ATTENTION DEFICIT HYPERACTIVITY DISORDER (ADHD), COMBINED TYPE: ICD-10-CM

## 2021-01-07 DIAGNOSIS — E53.8 VITAMIN B12 DEFICIENCY: ICD-10-CM

## 2021-01-07 DIAGNOSIS — R53.82 CHRONIC FATIGUE: ICD-10-CM

## 2021-01-07 DIAGNOSIS — E55.9 VITAMIN D DEFICIENCY: ICD-10-CM

## 2021-01-07 PROCEDURE — 99396 PREV VISIT EST AGE 40-64: CPT | Performed by: INTERNAL MEDICINE

## 2021-01-07 RX ORDER — VARENICLINE TARTRATE 1 MG/1
1 TABLET, FILM COATED ORAL 2 TIMES DAILY
Qty: 180 TABLET | Refills: 1 | Status: SHIPPED | OUTPATIENT
Start: 2021-01-07 | End: 2021-04-06 | Stop reason: SDUPTHER

## 2021-01-07 RX ORDER — DEXTROAMPHETAMINE SACCHARATE, AMPHETAMINE ASPARTATE MONOHYDRATE, DEXTROAMPHETAMINE SULFATE AND AMPHETAMINE SULFATE 1.25; 1.25; 1.25; 1.25 MG/1; MG/1; MG/1; MG/1
5 CAPSULE, EXTENDED RELEASE ORAL DAILY
Qty: 30 CAPSULE | Refills: 0 | Status: SHIPPED | OUTPATIENT
Start: 2021-01-07 | End: 2021-02-12 | Stop reason: SDUPTHER

## 2021-01-07 ASSESSMENT — PATIENT HEALTH QUESTIONNAIRE - PHQ9
1. LITTLE INTEREST OR PLEASURE IN DOING THINGS: 0
2. FEELING DOWN, DEPRESSED OR HOPELESS: 0

## 2021-01-11 ENCOUNTER — HOSPITAL ENCOUNTER (OUTPATIENT)
Age: 46
Discharge: HOME | End: 2021-01-11
Payer: COMMERCIAL

## 2021-01-11 DIAGNOSIS — E53.8 VITAMIN B12 DEFICIENCY: ICD-10-CM

## 2021-01-11 DIAGNOSIS — E55.9 VITAMIN D DEFICIENCY: ICD-10-CM

## 2021-01-11 DIAGNOSIS — R53.82 CHRONIC FATIGUE: ICD-10-CM

## 2021-01-11 DIAGNOSIS — Z00.00 ANNUAL PHYSICAL EXAM: ICD-10-CM

## 2021-01-11 LAB
ABSOLUTE EOS #: 0.08 K/UL (ref 0–0.44)
ABSOLUTE IMMATURE GRANULOCYTE: 0.04 K/UL (ref 0–0.3)
ABSOLUTE LYMPH #: 2.2 K/UL (ref 1.1–3.7)
ABSOLUTE MONO #: 0.68 K/UL (ref 0.1–1.2)
ALBUMIN SERPL-MCNC: 4.2 G/DL (ref 3.5–5.2)
ALBUMIN/GLOBULIN RATIO: 1.7 (ref 1–2.5)
ALP BLD-CCNC: 79 U/L (ref 35–104)
ALT SERPL-CCNC: 23 U/L (ref 5–33)
ANION GAP SERPL CALCULATED.3IONS-SCNC: 9 MMOL/L (ref 9–17)
AST SERPL-CCNC: 19 U/L
BASOPHILS # BLD: 0 % (ref 0–2)
BASOPHILS ABSOLUTE: <0.03 K/UL (ref 0–0.2)
BILIRUB SERPL-MCNC: 0.31 MG/DL (ref 0.3–1.2)
BUN BLDV-MCNC: 15 MG/DL (ref 6–20)
BUN/CREAT BLD: ABNORMAL (ref 9–20)
CALCIUM SERPL-MCNC: 9.6 MG/DL (ref 8.6–10.4)
CHLORIDE BLD-SCNC: 106 MMOL/L (ref 98–107)
CHOLESTEROL/HDL RATIO: 2.7
CHOLESTEROL: 206 MG/DL
CO2: 24 MMOL/L (ref 20–31)
CREAT SERPL-MCNC: 0.53 MG/DL (ref 0.5–0.9)
DIFFERENTIAL TYPE: ABNORMAL
EOSINOPHILS RELATIVE PERCENT: 1 % (ref 1–4)
ESTIMATED AVERAGE GLUCOSE: 128 MG/DL
FOLATE: 7.1 NG/ML
GFR AFRICAN AMERICAN: >60 ML/MIN
GFR NON-AFRICAN AMERICAN: >60 ML/MIN
GFR SERPL CREATININE-BSD FRML MDRD: ABNORMAL ML/MIN/{1.73_M2}
GFR SERPL CREATININE-BSD FRML MDRD: ABNORMAL ML/MIN/{1.73_M2}
GLUCOSE BLD-MCNC: 102 MG/DL (ref 70–99)
HBA1C MFR BLD: 6.1 % (ref 4–6)
HCT VFR BLD CALC: 42.3 % (ref 36.3–47.1)
HDLC SERPL-MCNC: 75 MG/DL
HEMOGLOBIN: 13.8 G/DL (ref 11.9–15.1)
IMMATURE GRANULOCYTES: 1 %
LDL CHOLESTEROL: 112 MG/DL (ref 0–130)
LYMPHOCYTES # BLD: 25 % (ref 24–43)
MCH RBC QN AUTO: 30.7 PG (ref 25.2–33.5)
MCHC RBC AUTO-ENTMCNC: 32.6 G/DL (ref 28.4–34.8)
MCV RBC AUTO: 94.2 FL (ref 82.6–102.9)
MONOCYTES # BLD: 8 % (ref 3–12)
NRBC AUTOMATED: 0 PER 100 WBC
PDW BLD-RTO: 14.2 % (ref 11.8–14.4)
PLATELET # BLD: 295 K/UL (ref 138–453)
PLATELET ESTIMATE: ABNORMAL
PMV BLD AUTO: 10.4 FL (ref 8.1–13.5)
POTASSIUM SERPL-SCNC: 4.5 MMOL/L (ref 3.7–5.3)
RBC # BLD: 4.49 M/UL (ref 3.95–5.11)
RBC # BLD: ABNORMAL 10*6/UL
SEG NEUTROPHILS: 65 % (ref 36–65)
SEGMENTED NEUTROPHILS ABSOLUTE COUNT: 5.82 K/UL (ref 1.5–8.1)
SODIUM BLD-SCNC: 139 MMOL/L (ref 135–144)
TOTAL PROTEIN: 6.7 G/DL (ref 6.4–8.3)
TRIGL SERPL-MCNC: 97 MG/DL
TSH SERPL DL<=0.05 MIU/L-ACNC: 1.39 MIU/L (ref 0.3–5)
VITAMIN B-12: 615 PG/ML (ref 232–1245)
VITAMIN D 25-HYDROXY: 23 NG/ML (ref 30–100)
VLDLC SERPL CALC-MCNC: ABNORMAL MG/DL (ref 1–30)
WBC # BLD: 8.8 K/UL (ref 3.5–11.3)
WBC # BLD: ABNORMAL 10*3/UL

## 2021-01-11 PROCEDURE — 80053 COMPREHEN METABOLIC PANEL: CPT

## 2021-01-11 PROCEDURE — 36415 COLL VENOUS BLD VENIPUNCTURE: CPT

## 2021-01-11 PROCEDURE — 85025 COMPLETE CBC W/AUTO DIFF WBC: CPT

## 2021-01-11 PROCEDURE — 82746 ASSAY OF FOLIC ACID SERUM: CPT

## 2021-01-11 PROCEDURE — 83036 HEMOGLOBIN GLYCOSYLATED A1C: CPT

## 2021-01-11 PROCEDURE — 80061 LIPID PANEL: CPT

## 2021-01-11 PROCEDURE — 82306 VITAMIN D 25 HYDROXY: CPT

## 2021-01-11 PROCEDURE — 84443 ASSAY THYROID STIM HORMONE: CPT

## 2021-01-11 PROCEDURE — 82607 VITAMIN B-12: CPT

## 2021-01-14 ASSESSMENT — ENCOUNTER SYMPTOMS
ABDOMINAL PAIN: 0
SINUS PAIN: 0
WHEEZING: 0
NAUSEA: 0
BACK PAIN: 0
CONSTIPATION: 0
SHORTNESS OF BREATH: 0
COUGH: 0
VOMITING: 0
SINUS PRESSURE: 0
DIARRHEA: 0
ABDOMINAL DISTENTION: 0

## 2021-01-15 RX ORDER — ERGOCALCIFEROL 1.25 MG/1
50000 CAPSULE ORAL WEEKLY
Qty: 12 CAPSULE | Refills: 1 | Status: SHIPPED | OUTPATIENT
Start: 2021-01-15 | End: 2021-10-12

## 2021-01-15 NOTE — PROGRESS NOTES
[address] PRIMARY CARE  [address] [facility] [address]  [address] [address]  Dept: [phone]  Dept Fax: [phone]    Felicia Rm is a 50 y.o. female who presents today for her medical conditions/complaints as noted below. Chief Complaint   Patient presents with    Follow-up     Pt c/o hard time focusing       HPI:     This is a 47-year-old female who is here for regular follow-up and annual physical.  She is due for multiple blood work. Reviewed all the medications updated list.  She has been having attention deficit and she has had this issues since he was a kid as well. She would like to quit smoking and would like refill on Chantix. No other complaints or concerns. Hemoglobin A1C (%)   Date Value   2021 6.1 (H)   2019 5.9             ( goal A1C is < 7)   No results found for: LABMICR  LDL Cholesterol (mg/dL)   Date Value   2021 112   2019 99   2019 102     LDL Calculated (mg/dL)   Date Value   2015 81       (goal LDL is <100)   AST (U/L)   Date Value   2021 19     ALT (U/L)   Date Value   2021 23     BUN (mg/dL)   Date Value   2021 15     BP Readings from Last 3 Encounters:   21 110/74   10/08/20 112/82   20 129/80          (goal 120/80)    Past Medical History:   Diagnosis Date    Acute deep vein thrombosis (DVT) of right lower extremity ([address] 2017    Acute internal jugular vein embolism ([address] 2017    [facility] reviewed on 2017, see Media tab of Chart Review for more information. Correspondence dated 2017 from Dr. Jovani Selby reviewed on 17, see Media tab of Chart Review for more information.  Correspondence dated 2017 from Dr. Jovani Selby reviewed on 2017, see Media tab of Chart Review for more information    Asthma     Bronchitis     DVT (deep venous thrombosis) [facility])       Past Surgical History: Procedure Laterality Date    APPENDECTOMY      HYSTERECTOMY      TONSILLECTOMY         History reviewed. No pertinent family history. Social History     Tobacco Use    Smoking status: Current Every Day Smoker     Packs/day: 1.00     Years: 30.00     Pack years: 30.00     Types: Cigarettes    Smokeless tobacco: Never Used    Tobacco comment: on Chantix   Substance Use Topics    Alcohol use: Yes     Alcohol/week: 2.0 standard drinks     Types: 2 Cans of beer per week      Current Outpatient Medications   Medication Sig Dispense Refill    Varenicline Tartrate (CHANTIX PO) Take by mouth      varenicline (CHANTIX) 1 MG tablet Take 1 tablet by mouth 2 times daily 180 tablet 1    amphetamine-dextroamphetamine (ADDERALL XR) 5 MG extended release capsule Take 1 capsule by mouth daily for 30 days. 30 capsule 0    ibuprofen (ADVIL;MOTRIN) 800 MG tablet Take 1 tablet by mouth every 8 hours as needed for Pain 180 tablet 1    albuterol sulfate  (90 Base) MCG/ACT inhaler Inhale 1 to 2 puffs by mouth every 4 hours as needed for wheezing 18 g 2    budesonide-formoterol (SYMBICORT) 160-4.5 MCG/ACT AERO Inhale 2 puffs into the lungs 2 times daily 3 Inhaler 1    aspirin 81 MG tablet Take 81 mg by mouth       No current facility-administered medications for this visit.       Allergies   Allergen Reactions    Pneumovax [Pneumococcal Polysaccharide Vaccine] Swelling     Localized swelling and facial swelling    Prevnar 13  [Pneumococcal 13-Andria Conj Vacc] Swelling       Health Maintenance   Topic Date Due    Hepatitis C screen  1975    HIV screen  07/02/1988    Flu vaccine (1) 10/08/2021 (Originally 9/1/2020)    Cervical cancer screen  07/23/2089 (Originally 7/2/1994)    A1C test (Diabetic or Prediabetic)  01/11/2022    DTaP/Tdap/Td vaccine (2 - Td) 05/28/2024    Lipid screen  01/11/2026    Hepatitis A vaccine  Aged Out    Hepatitis B vaccine  Aged Out    Hib vaccine  Aged Out Carotid pulses are 2+ on the right side and 2+ on the left side. Radial pulses are 2+ on the right side and 2+ on the left side. Heart sounds: Normal heart sounds. No murmur. Pulmonary:      Effort: Pulmonary effort is normal. No accessory muscle usage or respiratory distress. Breath sounds: Normal breath sounds. No wheezing or rales. Abdominal:      General: Bowel sounds are normal. There is no distension. Palpations: Abdomen is soft. Tenderness: There is no abdominal tenderness. Musculoskeletal: Normal range of motion. General: No deformity. Lymphadenopathy:      Cervical: No cervical adenopathy. Skin:     General: Skin is warm. Capillary Refill: Capillary refill takes less than 2 seconds. Findings: No rash. Neurological:      Mental Status: She is alert and oriented to person, place, and time. Deep Tendon Reflexes: Reflexes normal.   Psychiatric:         Behavior: Behavior normal.       /74   Pulse 88   Temp 97 °F (36.1 °C) (Temporal)   Resp 18   Wt 210 lb 9.6 oz (95.5 kg)   LMP 06/15/2015 (Exact Date)   SpO2 98%   BMI 35.05 kg/m²     Assessment:          1. Annual physical exam    - CBC Auto Differential; Future  - TSH with Reflex; Future  - Lipid Panel; Future  - Comprehensive Metabolic Panel; Future  - Vitamin B12 & Folate; Future  - Vitamin D 25 Hydroxy; Future  - Hemoglobin A1C; Future    2. Smoker    - varenicline (CHANTIX) 1 MG tablet; Take 1 tablet by mouth 2 times daily  Dispense: 180 tablet; Refill: 1    3. Attention deficit hyperactivity disorder (ADHD), combined type    - amphetamine-dextroamphetamine (ADDERALL XR) 5 MG extended release capsule; Take 1 capsule by mouth daily for 30 days. Dispense: 30 capsule; Refill: 0    4. Vitamin D deficiency    - Vitamin D 25 Hydroxy; Future    5. Vitamin B12 deficiency    - Vitamin B12 & Folate; Future    6. Chronic fatigue    - TSH with Reflex; Future  - Vitamin B12 & Folate;  Future - Vitamin D 25 Hydroxy; Future  - Hemoglobin A1C; Future            Diagnosis Orders   1. Annual physical exam  CBC Auto Differential    TSH with Reflex    Lipid Panel    Comprehensive Metabolic Panel    Vitamin B12 & Folate    Vitamin D 25 Hydroxy    Hemoglobin A1C   2. Smoker  varenicline (CHANTIX) 1 MG tablet   3. Attention deficit hyperactivity disorder (ADHD), combined type  amphetamine-dextroamphetamine (ADDERALL XR) 5 MG extended release capsule   4. Vitamin D deficiency  Vitamin D 25 Hydroxy   5. Vitamin B12 deficiency  Vitamin B12 & Folate   6. Chronic fatigue  TSH with Reflex    Vitamin B12 & Folate    Vitamin D 25 Hydroxy    Hemoglobin A1C           Plan:      Return in about 3 months (around 4/7/2021) for Routine follow-up. Orders Placed This Encounter   Procedures    CBC Auto Differential     Standing Status:   Future     Number of Occurrences:   1     Standing Expiration Date:   1/7/2022    TSH with Reflex     Standing Status:   Future     Number of Occurrences:   1     Standing Expiration Date:   1/7/2022    Lipid Panel     Standing Status:   Future     Number of Occurrences:   1     Standing Expiration Date:   4/7/2021     Order Specific Question:   Is Patient Fasting?/# of Hours     Answer:    Fast 8-10 hours    Comprehensive Metabolic Panel     Standing Status:   Future     Number of Occurrences:   1     Standing Expiration Date:   1/7/2022    Vitamin B12 & Folate     Standing Status:   Future     Number of Occurrences:   1     Standing Expiration Date:   1/7/2022    Vitamin D 25 Hydroxy     Standing Status:   Future     Number of Occurrences:   1     Standing Expiration Date:   1/7/2022    Hemoglobin A1C     Standing Status:   Future     Number of Occurrences:   1     Standing Expiration Date:   1/7/2022     Orders Placed This Encounter   Medications    varenicline (CHANTIX) 1 MG tablet     Sig: Take 1 tablet by mouth 2 times daily     Dispense:  180 tablet     Refill:  1  amphetamine-dextroamphetamine (ADDERALL XR) 5 MG extended release capsule     Sig: Take 1 capsule by mouth daily for 30 days. Dispense:  30 capsule     Refill:  0         Patient given educational materials - see patient instructions. Discussed use, benefit, and side effects of prescribedmedications. All patient questions answered. Pt voiced understanding. Reviewed health maintenance. Instructed to continue current medications, diet and exercise. Patient agreed with treatment plan. Follow up as directed. I spent a total of 30 minutes face to face with this patient. Over 50% of that time was spent on counseling and care coordination. Please see assessment and plan for details. Electronically signed by Ness Bergeron MD on 1/14/2021 at 8:33 PM      Please note that this chart was generated using voice recognition Dragon dictation software. Although every effort was made to ensure the accuracy of this automatedtranscription, some errors in transcription may have occurred.

## 2021-01-26 DIAGNOSIS — J41.8 MIXED SIMPLE AND MUCOPURULENT CHRONIC BRONCHITIS (HCC): ICD-10-CM

## 2021-01-26 RX ORDER — ALBUTEROL SULFATE 90 UG/1
AEROSOL, METERED RESPIRATORY (INHALATION)
Qty: 18 G | Refills: 2 | Status: SHIPPED | OUTPATIENT
Start: 2021-01-26 | End: 2021-05-13 | Stop reason: SDUPTHER

## 2021-02-12 DIAGNOSIS — F90.2 ATTENTION DEFICIT HYPERACTIVITY DISORDER (ADHD), COMBINED TYPE: ICD-10-CM

## 2021-02-12 RX ORDER — DEXTROAMPHETAMINE SACCHARATE, AMPHETAMINE ASPARTATE MONOHYDRATE, DEXTROAMPHETAMINE SULFATE AND AMPHETAMINE SULFATE 1.25; 1.25; 1.25; 1.25 MG/1; MG/1; MG/1; MG/1
5 CAPSULE, EXTENDED RELEASE ORAL DAILY
Qty: 30 CAPSULE | Refills: 0 | Status: SHIPPED | OUTPATIENT
Start: 2021-02-12 | End: 2021-03-09 | Stop reason: SDUPTHER

## 2021-02-12 NOTE — TELEPHONE ENCOUNTER
LOV 1/7/2021    Next appt 4/8/2021    Health Maintenance   Topic Date Due    Hepatitis C screen  1975    HIV screen  07/02/1988    Flu vaccine (1) 10/08/2021 (Originally 9/1/2020)    Cervical cancer screen  07/23/2089 (Originally 7/2/1994)    A1C test (Diabetic or Prediabetic)  01/11/2022    DTaP/Tdap/Td vaccine (2 - Td) 05/28/2024    Lipid screen  01/11/2026    Hepatitis A vaccine  Aged Out    Hepatitis B vaccine  Aged Out    Hib vaccine  Aged Out    Meningococcal (ACWY) vaccine  Aged Out             (applicable per patient's age: Cancer Screenings, Depression Screening, Fall Risk Screening, Immunizations)    Hemoglobin A1C (%)   Date Value   01/11/2021 6.1 (H)   08/26/2019 5.9     LDL Cholesterol (mg/dL)   Date Value   01/11/2021 112     LDL Calculated (mg/dL)   Date Value   08/28/2015 81     AST (U/L)   Date Value   01/11/2021 19     ALT (U/L)   Date Value   01/11/2021 23     BUN (mg/dL)   Date Value   01/11/2021 15      (goal A1C is < 7)   (goal LDL is <100) need 30-50% reduction from baseline     BP Readings from Last 3 Encounters:   01/07/21 110/74   10/08/20 112/82   05/14/20 129/80    (goal /80)      All Future Testing planned in [facility]:  Lab Frequency Next Occurrence       Next Visit Date:  Future Appointments   Date Time Provider Manjit Motta   4/8/2021  2:45 PM Hitesh Aldana MD [facility]            Patient Active Problem List:     Asthma     Smoker     Hypercoagulable state ([address]     Left knee pain     Anxiety     Mixed hyperlipidemia     Murmur     Degenerative joint disease (DJD) of lumbar spine     Displacement of lumbar intervertebral disc without myelopathy     Internal jugular (IJ) vein thromboembolism, chronic (HCC)     Uterine leiomyoma     Sacroiliitis, not elsewhere classified ([address]

## 2021-03-02 DIAGNOSIS — J41.8 MIXED SIMPLE AND MUCOPURULENT CHRONIC BRONCHITIS (HCC): ICD-10-CM

## 2021-03-02 RX ORDER — BUDESONIDE AND FORMOTEROL FUMARATE DIHYDRATE 160; 4.5 UG/1; UG/1
AEROSOL RESPIRATORY (INHALATION)
Qty: 30.6 G | Refills: 1 | Status: SHIPPED | OUTPATIENT
Start: 2021-03-02 | End: 2021-09-13 | Stop reason: SDUPTHER

## 2021-03-02 NOTE — TELEPHONE ENCOUNTER
Last visit: 1/7/21    Next visit: 4/8/21    Health Maintenance   Topic Date Due    Hepatitis C screen  Never done    HIV screen  Never done    Flu vaccine (1) 10/08/2021 (Originally 9/1/2020)    Cervical cancer screen  07/23/2089 (Originally 7/2/1994)    A1C test (Diabetic or Prediabetic)  01/11/2022    DTaP/Tdap/Td vaccine (2 - Td) 05/28/2024    Lipid screen  01/11/2026    Hepatitis A vaccine  Aged Out    Hepatitis B vaccine  Aged Out    Hib vaccine  Aged Out    Meningococcal (ACWY) vaccine  Aged Out             (applicable per patient's age: Cancer Screenings, Depression Screening, Fall Risk Screening, Immunizations)    Hemoglobin A1C (%)   Date Value   01/11/2021 6.1 (H)   08/26/2019 5.9     LDL Cholesterol (mg/dL)   Date Value   01/11/2021 112     LDL Calculated (mg/dL)   Date Value   08/28/2015 81     AST (U/L)   Date Value   01/11/2021 19     ALT (U/L)   Date Value   01/11/2021 23     BUN (mg/dL)   Date Value   01/11/2021 15      (goal A1C is < 7)   (goal LDL is <100) need 30-50% reduction from baseline     BP Readings from Last 3 Encounters:   01/07/21 110/74   10/08/20 112/82   05/14/20 129/80    (goal /80)      All Future Testing planned in [facility]:  Lab Frequency Next Occurrence       Next Visit Date:  Future Appointments   Date Time Provider Manjit Motta   3/15/2021  2:45 PM Stuart Glasgow MD [facility] [address] [place] [address]   4/8/2021  2:45 PM Bang Cleveland MD [facility]            Patient Active Problem List:     Asthma     Smoker     Hypercoagulable state ([facility]     Left knee pain     Anxiety     Mixed hyperlipidemia     Murmur     Degenerative joint disease (DJD) of lumbar spine     Displacement of lumbar intervertebral disc without myelopathy     Internal jugular (IJ) vein thromboembolism, chronic (HCC)     Uterine leiomyoma     Sacroiliitis, not elsewhere classified ([address]

## 2021-03-08 DIAGNOSIS — F90.2 ATTENTION DEFICIT HYPERACTIVITY DISORDER (ADHD), COMBINED TYPE: ICD-10-CM

## 2021-03-08 NOTE — TELEPHONE ENCOUNTER
Last OV 01/07/2021    PATIENT ASKING IF MED CAN BE INCREASED FROM 5 MG TO 10 MG. Writer pended 5mg.         Health Maintenance   Topic Date Due    Hepatitis C screen  Never done    HIV screen  Never done    Flu vaccine (1) 10/08/2021 (Originally 9/1/2020)    Cervical cancer screen  07/23/2089 (Originally 7/2/1994)    A1C test (Diabetic or Prediabetic)  01/11/2022    DTaP/Tdap/Td vaccine (2 - Td) 05/28/2024    Lipid screen  01/11/2026    Hepatitis A vaccine  Aged Out    Hepatitis B vaccine  Aged Out    Hib vaccine  Aged Out    Meningococcal (ACWY) vaccine  Aged Out             (applicable per patient's age: Cancer Screenings, Depression Screening, Fall Risk Screening, Immunizations)    Hemoglobin A1C (%)   Date Value   01/11/2021 6.1 (H)   08/26/2019 5.9     LDL Cholesterol (mg/dL)   Date Value   01/11/2021 112     LDL Calculated (mg/dL)   Date Value   08/28/2015 81     AST (U/L)   Date Value   01/11/2021 19     ALT (U/L)   Date Value   01/11/2021 23     BUN (mg/dL)   Date Value   01/11/2021 15      (goal A1C is < 7)   (goal LDL is <100) need 30-50% reduction from baseline     BP Readings from Last 3 Encounters:   01/07/21 110/74   10/08/20 112/82   05/14/20 129/80    (goal /80)      All Future Testing planned in [facility]:  Lab Frequency Next Occurrence       Next Visit Date:  Future Appointments   Date Time Provider Manjit Steineri   3/15/2021  2:45 PM Jovanni Hinkle MD [facility] [address] [facility]   4/8/2021  2:45 PM Evelyn Izquierdo MD [facility]            Patient Active Problem List:     Asthma     Smoker     Hypercoagulable state ([address]     Left knee pain     Anxiety     Mixed hyperlipidemia     Murmur     Degenerative joint disease (DJD) of lumbar spine     Displacement of lumbar intervertebral disc without myelopathy     Internal jugular (IJ) vein thromboembolism, chronic (HCC)     Uterine leiomyoma     Sacroiliitis, not elsewhere classified ([address]

## 2021-03-09 RX ORDER — DEXTROAMPHETAMINE SACCHARATE, AMPHETAMINE ASPARTATE MONOHYDRATE, DEXTROAMPHETAMINE SULFATE AND AMPHETAMINE SULFATE 1.25; 1.25; 1.25; 1.25 MG/1; MG/1; MG/1; MG/1
5 CAPSULE, EXTENDED RELEASE ORAL DAILY
Qty: 30 CAPSULE | Refills: 0 | Status: SHIPPED | OUTPATIENT
Start: 2021-03-09 | End: 2021-04-06 | Stop reason: SDUPTHER

## 2021-03-25 ENCOUNTER — HOSPITAL ENCOUNTER (OUTPATIENT)
Dept: VASCULAR LAB | Age: 46
Discharge: HOME | End: 2021-03-25
Payer: COMMERCIAL

## 2021-03-25 ENCOUNTER — HOSPITAL ENCOUNTER (OUTPATIENT)
Age: 46
Discharge: HOME | End: 2021-03-25
Payer: COMMERCIAL

## 2021-03-25 DIAGNOSIS — O22.30 DVT (DEEP VEIN THROMBOSIS) IN PREGNANCY: ICD-10-CM

## 2021-03-25 DIAGNOSIS — D68.59 HYPERCOAGULABLE STATE (HCC): Primary | ICD-10-CM

## 2021-03-25 LAB
ABSOLUTE EOS #: 0.08 K/UL (ref 0–0.44)
ABSOLUTE IMMATURE GRANULOCYTE: 0.03 K/UL (ref 0–0.3)
ABSOLUTE LYMPH #: 2.32 K/UL (ref 1.1–3.7)
ABSOLUTE MONO #: 0.74 K/UL (ref 0.1–1.2)
ANION GAP SERPL CALCULATED.3IONS-SCNC: 11 MMOL/L (ref 9–17)
BASOPHILS # BLD: 0 % (ref 0–2)
BASOPHILS ABSOLUTE: <0.03 K/UL (ref 0–0.2)
BUN BLDV-MCNC: 13 MG/DL (ref 6–20)
BUN/CREAT BLD: NORMAL (ref 9–20)
C-REACTIVE PROTEIN: 7.3 MG/L (ref 0–5)
CALCIUM SERPL-MCNC: 9.5 MG/DL (ref 8.6–10.4)
CHLORIDE BLD-SCNC: 105 MMOL/L (ref 98–107)
CO2: 25 MMOL/L (ref 20–31)
CREAT SERPL-MCNC: 0.52 MG/DL (ref 0.5–0.9)
DIFFERENTIAL TYPE: ABNORMAL
EOSINOPHILS RELATIVE PERCENT: 1 % (ref 1–4)
GFR AFRICAN AMERICAN: >60 ML/MIN
GFR NON-AFRICAN AMERICAN: >60 ML/MIN
GFR SERPL CREATININE-BSD FRML MDRD: NORMAL ML/MIN/{1.73_M2}
GFR SERPL CREATININE-BSD FRML MDRD: NORMAL ML/MIN/{1.73_M2}
GLUCOSE BLD-MCNC: 88 MG/DL (ref 70–99)
HCT VFR BLD CALC: 43.2 % (ref 36.3–47.1)
HEMOGLOBIN: 13.5 G/DL (ref 11.9–15.1)
IMMATURE GRANULOCYTES: 0 %
LYMPHOCYTES # BLD: 23 % (ref 24–43)
MAGNESIUM: 2.2 MG/DL (ref 1.6–2.6)
MCH RBC QN AUTO: 30.5 PG (ref 25.2–33.5)
MCHC RBC AUTO-ENTMCNC: 31.3 G/DL (ref 28.4–34.8)
MCV RBC AUTO: 97.5 FL (ref 82.6–102.9)
MONOCYTES # BLD: 8 % (ref 3–12)
NRBC AUTOMATED: 0 PER 100 WBC
PDW BLD-RTO: 15.2 % (ref 11.8–14.4)
PLATELET # BLD: 276 K/UL (ref 138–453)
PLATELET ESTIMATE: ABNORMAL
PMV BLD AUTO: 10.8 FL (ref 8.1–13.5)
POTASSIUM SERPL-SCNC: 4.2 MMOL/L (ref 3.7–5.3)
RBC # BLD: 4.43 M/UL (ref 3.95–5.11)
RBC # BLD: ABNORMAL 10*6/UL
SEDIMENTATION RATE, ERYTHROCYTE: 16 MM (ref 0–20)
SEG NEUTROPHILS: 68 % (ref 36–65)
SEGMENTED NEUTROPHILS ABSOLUTE COUNT: 6.72 K/UL (ref 1.5–8.1)
SODIUM BLD-SCNC: 141 MMOL/L (ref 135–144)
WBC # BLD: 9.9 K/UL (ref 3.5–11.3)
WBC # BLD: ABNORMAL 10*3/UL

## 2021-03-25 PROCEDURE — 80048 BASIC METABOLIC PNL TOTAL CA: CPT

## 2021-03-25 PROCEDURE — 85652 RBC SED RATE AUTOMATED: CPT

## 2021-03-25 PROCEDURE — 83735 ASSAY OF MAGNESIUM: CPT

## 2021-03-25 PROCEDURE — 86140 C-REACTIVE PROTEIN: CPT

## 2021-03-25 PROCEDURE — 36415 COLL VENOUS BLD VENIPUNCTURE: CPT

## 2021-03-25 PROCEDURE — 85025 COMPLETE CBC W/AUTO DIFF WBC: CPT

## 2021-03-25 PROCEDURE — 93970 EXTREMITY STUDY: CPT

## 2021-03-31 PROBLEM — I82.90 DEEP VEIN THROMBOSIS (DVT) OF NON-EXTREMITY VEIN: Status: ACTIVE | Noted: 2021-03-31

## 2021-04-06 ENCOUNTER — OFFICE VISIT (OUTPATIENT)
Dept: PRIMARY CARE CLINIC | Age: 46
End: 2021-04-06
Payer: COMMERCIAL

## 2021-04-06 VITALS
OXYGEN SATURATION: 98 % | BODY MASS INDEX: 34.38 KG/M2 | SYSTOLIC BLOOD PRESSURE: 114 MMHG | DIASTOLIC BLOOD PRESSURE: 82 MMHG | WEIGHT: 206.6 LBS | RESPIRATION RATE: 18 BRPM | HEART RATE: 80 BPM

## 2021-04-06 DIAGNOSIS — F90.2 ATTENTION DEFICIT HYPERACTIVITY DISORDER (ADHD), COMBINED TYPE: ICD-10-CM

## 2021-04-06 DIAGNOSIS — F17.200 SMOKER: ICD-10-CM

## 2021-04-06 PROCEDURE — 99213 OFFICE O/P EST LOW 20 MIN: CPT | Performed by: INTERNAL MEDICINE

## 2021-04-06 RX ORDER — DEXTROAMPHETAMINE SACCHARATE, AMPHETAMINE ASPARTATE MONOHYDRATE, DEXTROAMPHETAMINE SULFATE AND AMPHETAMINE SULFATE 2.5; 2.5; 2.5; 2.5 MG/1; MG/1; MG/1; MG/1
10 CAPSULE, EXTENDED RELEASE ORAL DAILY
Qty: 30 CAPSULE | Refills: 0 | Status: SHIPPED | OUTPATIENT
Start: 2021-04-06 | End: 2021-05-04 | Stop reason: SDUPTHER

## 2021-04-06 RX ORDER — VARENICLINE TARTRATE 1 MG/1
1 TABLET, FILM COATED ORAL 2 TIMES DAILY
Qty: 180 TABLET | Refills: 1 | Status: SHIPPED | OUTPATIENT
Start: 2021-04-06 | End: 2021-09-13 | Stop reason: SDUPTHER

## 2021-04-06 RX ORDER — DEXTROAMPHETAMINE SACCHARATE, AMPHETAMINE ASPARTATE MONOHYDRATE, DEXTROAMPHETAMINE SULFATE AND AMPHETAMINE SULFATE 1.25; 1.25; 1.25; 1.25 MG/1; MG/1; MG/1; MG/1
10 CAPSULE, EXTENDED RELEASE ORAL DAILY
Qty: 60 CAPSULE | Refills: 0 | Status: SHIPPED | OUTPATIENT
Start: 2021-04-06 | End: 2021-04-06

## 2021-04-10 ASSESSMENT — ENCOUNTER SYMPTOMS
BACK PAIN: 0
CONSTIPATION: 0
VOMITING: 0
WHEEZING: 0
DIARRHEA: 0
NAUSEA: 0
ABDOMINAL DISTENTION: 0
ABDOMINAL PAIN: 0
COUGH: 0
SINUS PAIN: 0
SINUS PRESSURE: 0
SHORTNESS OF BREATH: 0

## 2021-04-10 NOTE — PROGRESS NOTES
[facility] PRIMARY CARE  [facility] [address]  [address] [address]  Dept: [phone]  Dept Fax: [phone]    Fabricio Jerez is a 50 y.o. female who presents today for her medical conditions/complaints as noted below. Chief Complaint   Patient presents with    Follow-up     pt c/o feeling waves of \"hotness\" in head and dizziness while relaxed       HPI:     This is a 78-year-old female who is here for follow-up for ADHD and also to discuss about quitting smoking. Discussed about Chantix and she needs refill on Adderall. She also has postmenopausal symptoms which is on and off. Reviewed all the meds and updated the list.      Hemoglobin A1C (%)   Date Value   01/11/2021 6.1 (H)   08/26/2019 5.9             ( goal A1C is < 7)   No results found for: LABMICR  LDL Cholesterol (mg/dL)   Date Value   01/11/2021 112   08/26/2019 99   08/26/2019 102     LDL Calculated (mg/dL)   Date Value   08/28/2015 81       (goal LDL is <100)   AST (U/L)   Date Value   01/11/2021 19     ALT (U/L)   Date Value   01/11/2021 23     BUN (mg/dL)   Date Value   03/25/2021 13     BP Readings from Last 3 Encounters:   04/06/21 114/82   03/31/21 110/68   03/15/21 128/70          (goal 120/80)    Past Medical History:   Diagnosis Date    Acute deep vein thrombosis (DVT) of right lower extremity ([address] 2/16/2017    Acute internal jugular vein embolism ([address] 2/16/2017    Correspondence dated February 21 2017 from [employer] reviewed on 2/22/2017, see Media tab of Chart Review for more information. Correspondence dated July 19, 2017 from Dr. Sandra Ferguson reviewed on 7/20/17, see Media tab of Chart Review for more information.  Correspondence dated August 16, 2017 from Dr. Sandra Ferguson reviewed on 8/18/2017, see Media tab of Chart Review for more information    Asthma     Bronchitis     DVT (deep venous thrombosis) ([address]       Past Surgical History:   Procedure Laterality Date    APPENDECTOMY      HYSTERECTOMY      TONSILLECTOMY         History reviewed. No pertinent family history. Social History     Tobacco Use    Smoking status: Current Every Day Smoker     Packs/day: 1.00     Years: 30.00     Pack years: 30.00     Types: Cigarettes    Smokeless tobacco: Never Used    Tobacco comment: smokes 1 cigarette in morning, on Chantix   Substance Use Topics    Alcohol use: Yes     Alcohol/week: 2.0 standard drinks     Types: 2 Cans of beer per week      Current Outpatient Medications   Medication Sig Dispense Refill    MAGNESIUM PO Take by mouth 2 times daily      GARLIC PO Take by mouth      Multiple Vitamins-Minerals (CENTRUM SILVER PO) Take by mouth      varenicline (CHANTIX) 1 MG tablet Take 1 tablet by mouth 2 times daily 180 tablet 1    amphetamine-dextroamphetamine (ADDERALL XR) 10 MG extended release capsule Take 1 capsule by mouth daily for 30 days. 30 capsule 0    SYMBICORT 160-4.5 MCG/ACT AERO INHALE 2 PUFFS INTO THE LUNGS TWICE DAILY 30.6 g 1    albuterol sulfate  (90 Base) MCG/ACT inhaler Inhale 1 to 2 puffs by mouth every 4 hours as needed for wheezing 18 g 2    vitamin D (ERGOCALCIFEROL) 1.25 MG (82311 UT) CAPS capsule Take 1 capsule by mouth once a week 12 capsule 1    Varenicline Tartrate (CHANTIX PO) Take by mouth      ibuprofen (ADVIL;MOTRIN) 800 MG tablet Take 1 tablet by mouth every 8 hours as needed for Pain 180 tablet 1    aspirin 325 MG tablet Take 325 mg by mouth        No current facility-administered medications for this visit.       Allergies   Allergen Reactions    Pneumovax [Pneumococcal Polysaccharide Vaccine] Swelling     Localized swelling and facial swelling    Prevnar 13  [Pneumococcal 13-Andria Conj Vacc] Swelling       Health Maintenance   Topic Date Due    Hepatitis C screen  Never done    HIV screen  Never done    COVID-19 Vaccine (1) Never done    Flu vaccine (Season Ended) 10/08/2021 (Originally 9/1/2021)    Cervical cancer screen  07/23/2089 (Originally 7/2/1994)    A1C test (Diabetic or Prediabetic)  01/11/2022    DTaP/Tdap/Td vaccine (2 - Td) 05/28/2024    Lipid screen  01/11/2026    Hepatitis A vaccine  Aged Out    Hepatitis B vaccine  Aged Out    Hib vaccine  Aged Out    Meningococcal (ACWY) vaccine  Aged Out       Subjective:      Review of Systems   Constitutional: Negative for activity change, appetite change, chills, fatigue and fever. HENT: Negative for congestion, ear pain, hearing loss, nosebleeds, sinus pressure, sinus pain and sneezing. Eyes: Negative for visual disturbance. Respiratory: Negative for cough, shortness of breath and wheezing. Cardiovascular: Negative for chest pain and palpitations. Gastrointestinal: Negative for abdominal distention, abdominal pain, constipation, diarrhea, nausea and vomiting. Endocrine: Negative for cold intolerance, heat intolerance, polydipsia, polyphagia and polyuria. Genitourinary: Negative for difficulty urinating, menstrual problem, vaginal bleeding and vaginal discharge. Musculoskeletal: Negative for back pain and joint swelling. Skin: Negative for rash. Neurological: Negative for numbness and headaches. Psychiatric/Behavioral: Negative for sleep disturbance. The patient is not nervous/anxious. All other systems reviewed and are negative. Objective:     Physical Exam  Vitals signs and nursing note reviewed. Constitutional:       General: She is not in acute distress. Appearance: She is well-developed. She is not diaphoretic. HENT:      Head: Normocephalic and atraumatic. Right Ear: Hearing normal.      Left Ear: Hearing normal.      Mouth/Throat:      Pharynx: Uvula midline. Eyes:      General: No scleral icterus. Conjunctiva/sclera: Conjunctivae normal.      Pupils: Pupils are equal, round, and reactive to light. Neck:      Musculoskeletal: Full passive range of motion without pain, normal range of motion and neck supple.       Thyroid: No thyroid mass or thyromegaly. Vascular: No JVD. Trachea: Phonation normal.   Cardiovascular:      Rate and Rhythm: Normal rate and regular rhythm. Pulses: No decreased pulses. Carotid pulses are 2+ on the right side and 2+ on the left side. Radial pulses are 2+ on the right side and 2+ on the left side. Heart sounds: Normal heart sounds. No murmur. Pulmonary:      Effort: Pulmonary effort is normal. No accessory muscle usage or respiratory distress. Breath sounds: Normal breath sounds. No wheezing or rales. Abdominal:      General: Bowel sounds are normal. There is no distension. Palpations: Abdomen is soft. Tenderness: There is no abdominal tenderness. Musculoskeletal: Normal range of motion. General: No deformity. Lymphadenopathy:      Cervical: No cervical adenopathy. Skin:     General: Skin is warm. Capillary Refill: Capillary refill takes less than 2 seconds. Findings: No rash. Neurological:      Mental Status: She is alert and oriented to person, place, and time. Deep Tendon Reflexes: Reflexes normal.   Psychiatric:         Behavior: Behavior normal.       /82   Pulse 80   Resp 18   Wt 206 lb 9.6 oz (93.7 kg)   LMP 06/15/2015 (Exact Date)   SpO2 98%   BMI 34.38 kg/m²     Assessment:          1. Attention deficit hyperactivity disorder (ADHD), combined type    - amphetamine-dextroamphetamine (ADDERALL XR) 10 MG extended release capsule; Take 1 capsule by mouth daily for 30 days. Dispense: 30 capsule; Refill: 0    2. Smoker    - varenicline (CHANTIX) 1 MG tablet; Take 1 tablet by mouth 2 times daily  Dispense: 180 tablet; Refill: 1            Diagnosis Orders   1. Attention deficit hyperactivity disorder (ADHD), combined type  amphetamine-dextroamphetamine (ADDERALL XR) 10 MG extended release capsule    DISCONTINUED: amphetamine-dextroamphetamine (ADDERALL XR) 5 MG extended release capsule   2.  Smoker  varenicline (CHANTIX) 1 MG tablet           Plan:      No follow-ups on file. No orders of the defined types were placed in this encounter. Orders Placed This Encounter   Medications    varenicline (CHANTIX) 1 MG tablet     Sig: Take 1 tablet by mouth 2 times daily     Dispense:  180 tablet     Refill:  1    DISCONTD: amphetamine-dextroamphetamine (ADDERALL XR) 5 MG extended release capsule     Sig: Take 2 capsules by mouth daily for 30 days. Dispense:  60 capsule     Refill:  0    amphetamine-dextroamphetamine (ADDERALL XR) 10 MG extended release capsule     Sig: Take 1 capsule by mouth daily for 30 days. Dispense:  30 capsule     Refill:  0         Patient given educational materials - see patient instructions. Discussed use, benefit, and side effects of prescribedmedications. All patient questions answered. Pt voiced understanding. Reviewed health maintenance. Instructed to continue current medications, diet and exercise. Patient agreed with treatment plan. Follow up as directed. I spent a total of 15 minutes face to face with this patient. Over 50% of that time was spent on counseling and care coordination. Please see assessment and plan for details. Electronically signed by Bill Stallworth MD on 4/10/2021 at 9:44 AM      Please note that this chart was generated using voice recognition Dragon dictation software. Although every effort was made to ensure the accuracy of this automatedtranscription, some errors in transcription may have occurred.

## 2021-05-04 DIAGNOSIS — F90.2 ATTENTION DEFICIT HYPERACTIVITY DISORDER (ADHD), COMBINED TYPE: ICD-10-CM

## 2021-05-04 RX ORDER — DEXTROAMPHETAMINE SACCHARATE, AMPHETAMINE ASPARTATE MONOHYDRATE, DEXTROAMPHETAMINE SULFATE AND AMPHETAMINE SULFATE 2.5; 2.5; 2.5; 2.5 MG/1; MG/1; MG/1; MG/1
10 CAPSULE, EXTENDED RELEASE ORAL DAILY
Qty: 30 CAPSULE | Refills: 0 | Status: SHIPPED | OUTPATIENT
Start: 2021-05-04 | End: 2021-05-26 | Stop reason: SDUPTHER

## 2021-05-13 DIAGNOSIS — J41.8 MIXED SIMPLE AND MUCOPURULENT CHRONIC BRONCHITIS (HCC): ICD-10-CM

## 2021-05-13 RX ORDER — ALBUTEROL SULFATE 90 UG/1
AEROSOL, METERED RESPIRATORY (INHALATION)
Qty: 18 G | Refills: 2 | Status: SHIPPED | OUTPATIENT
Start: 2021-05-13 | End: 2021-09-13 | Stop reason: SDUPTHER

## 2021-05-26 DIAGNOSIS — F90.2 ATTENTION DEFICIT HYPERACTIVITY DISORDER (ADHD), COMBINED TYPE: ICD-10-CM

## 2021-05-26 RX ORDER — DEXTROAMPHETAMINE SACCHARATE, AMPHETAMINE ASPARTATE MONOHYDRATE, DEXTROAMPHETAMINE SULFATE AND AMPHETAMINE SULFATE 2.5; 2.5; 2.5; 2.5 MG/1; MG/1; MG/1; MG/1
10 CAPSULE, EXTENDED RELEASE ORAL DAILY
Qty: 30 CAPSULE | Refills: 0 | Status: SHIPPED | OUTPATIENT
Start: 2021-05-26 | End: 2021-07-29 | Stop reason: SDUPTHER

## 2021-07-26 ENCOUNTER — TELEPHONE (OUTPATIENT)
Dept: PRIMARY CARE CLINIC | Age: 46
End: 2021-07-26

## 2021-07-26 DIAGNOSIS — F90.2 ATTENTION DEFICIT HYPERACTIVITY DISORDER (ADHD), COMBINED TYPE: ICD-10-CM

## 2021-07-26 RX ORDER — DEXTROAMPHETAMINE SACCHARATE, AMPHETAMINE ASPARTATE MONOHYDRATE, DEXTROAMPHETAMINE SULFATE AND AMPHETAMINE SULFATE 2.5; 2.5; 2.5; 2.5 MG/1; MG/1; MG/1; MG/1
10 CAPSULE, EXTENDED RELEASE ORAL DAILY
Qty: 30 CAPSULE | Refills: 0 | OUTPATIENT
Start: 2021-07-26 | End: 2021-08-25

## 2021-07-26 NOTE — TELEPHONE ENCOUNTER
----- Message from Janell Matson sent at 7/26/2021 11:01 AM EDT -----  Subject: Refill Request    QUESTIONS  Name of Medication? amphetamine-dextroamphetamine (ADDERALL XR) 10 MG   extended release capsule  Patient-reported dosage and instructions? 10mg tablet once daily  How many days do you have left? 0  Preferred [facility]? [id number]  Pharmacy phone number (if available)? [phone]  Additional Information for Provider? Please call Ada Rivas when prescription   has been sent [phone]  ---------------------------------------------------------------------------  --------------  CALL BACK INFO  What is the best way for the office to contact you?  OK to leave message on   voicemail  Preferred Call Back Phone Number? [phone]

## 2021-07-26 NOTE — TELEPHONE ENCOUNTER
----- Message from Brien Jermaine sent at 7/26/2021 11:03 AM EDT -----  Subject: Message to Provider    QUESTIONS  Information for Provider? Patient was a former patient of Dr. Janice Ibarra   and said she suggested another provider in the practice for her to see but   doesn't remember who. Needs to schedule with that provider. Please call   Reanna Delong [phone]  ---------------------------------------------------------------------------  --------------  Rhonda BAIN  What is the best way for the office to contact you? OK to leave message on   voicemail  Preferred Call Back Phone Number? [phone]  ---------------------------------------------------------------------------  --------------  SCRIPT ANSWERS  Relationship to Patient?  Self

## 2021-07-26 NOTE — TELEPHONE ENCOUNTER
lov 4.6.21 Dr. Garcia Single  Nov Visit date not found    Pt called and left a message earlier today asking to schedule with new provider. Our office called back and LVM to schedule appt.

## 2021-07-29 DIAGNOSIS — F90.2 ATTENTION DEFICIT HYPERACTIVITY DISORDER (ADHD), COMBINED TYPE: ICD-10-CM

## 2021-07-29 RX ORDER — DEXTROAMPHETAMINE SACCHARATE, AMPHETAMINE ASPARTATE MONOHYDRATE, DEXTROAMPHETAMINE SULFATE AND AMPHETAMINE SULFATE 2.5; 2.5; 2.5; 2.5 MG/1; MG/1; MG/1; MG/1
10 CAPSULE, EXTENDED RELEASE ORAL DAILY
Qty: 30 CAPSULE | Refills: 0 | Status: SHIPPED | OUTPATIENT
Start: 2021-07-29 | End: 2021-08-26 | Stop reason: SDUPTHER

## 2021-08-26 DIAGNOSIS — F90.2 ATTENTION DEFICIT HYPERACTIVITY DISORDER (ADHD), COMBINED TYPE: ICD-10-CM

## 2021-08-26 RX ORDER — DEXTROAMPHETAMINE SACCHARATE, AMPHETAMINE ASPARTATE MONOHYDRATE, DEXTROAMPHETAMINE SULFATE AND AMPHETAMINE SULFATE 2.5; 2.5; 2.5; 2.5 MG/1; MG/1; MG/1; MG/1
10 CAPSULE, EXTENDED RELEASE ORAL DAILY
Qty: 30 CAPSULE | Refills: 0 | Status: SHIPPED | OUTPATIENT
Start: 2021-08-26 | End: 2021-09-13 | Stop reason: SDUPTHER

## 2021-09-13 ENCOUNTER — OFFICE VISIT (OUTPATIENT)
Dept: PRIMARY CARE CLINIC | Age: 46
End: 2021-09-13
Payer: COMMERCIAL

## 2021-09-13 VITALS
BODY MASS INDEX: 33.15 KG/M2 | DIASTOLIC BLOOD PRESSURE: 84 MMHG | WEIGHT: 199.2 LBS | OXYGEN SATURATION: 93 % | RESPIRATION RATE: 14 BRPM | SYSTOLIC BLOOD PRESSURE: 110 MMHG | HEART RATE: 94 BPM

## 2021-09-13 DIAGNOSIS — Z76.89 ENCOUNTER TO ESTABLISH CARE: Primary | ICD-10-CM

## 2021-09-13 DIAGNOSIS — D68.59 HYPERCOAGULABLE STATE (HCC): ICD-10-CM

## 2021-09-13 DIAGNOSIS — R32 URINARY INCONTINENCE, UNSPECIFIED TYPE: ICD-10-CM

## 2021-09-13 DIAGNOSIS — J01.90 ACUTE NON-RECURRENT SINUSITIS, UNSPECIFIED LOCATION: ICD-10-CM

## 2021-09-13 DIAGNOSIS — Z12.11 SCREENING FOR COLON CANCER: ICD-10-CM

## 2021-09-13 DIAGNOSIS — I82.C29: ICD-10-CM

## 2021-09-13 DIAGNOSIS — H65.92 LEFT OTITIS MEDIA WITH EFFUSION: ICD-10-CM

## 2021-09-13 DIAGNOSIS — F17.200 SMOKER: ICD-10-CM

## 2021-09-13 DIAGNOSIS — J41.8 MIXED SIMPLE AND MUCOPURULENT CHRONIC BRONCHITIS (HCC): ICD-10-CM

## 2021-09-13 DIAGNOSIS — J45.909 UNCOMPLICATED ASTHMA, UNSPECIFIED ASTHMA SEVERITY, UNSPECIFIED WHETHER PERSISTENT: ICD-10-CM

## 2021-09-13 DIAGNOSIS — F90.2 ATTENTION DEFICIT HYPERACTIVITY DISORDER (ADHD), COMBINED TYPE: ICD-10-CM

## 2021-09-13 DIAGNOSIS — M54.42 ACUTE LEFT-SIDED LOW BACK PAIN WITH LEFT-SIDED SCIATICA: ICD-10-CM

## 2021-09-13 PROCEDURE — 99204 OFFICE O/P NEW MOD 45 MIN: CPT | Performed by: NURSE PRACTITIONER

## 2021-09-13 RX ORDER — DEXTROAMPHETAMINE SACCHARATE, AMPHETAMINE ASPARTATE MONOHYDRATE, DEXTROAMPHETAMINE SULFATE AND AMPHETAMINE SULFATE 5; 5; 5; 5 MG/1; MG/1; MG/1; MG/1
20 CAPSULE, EXTENDED RELEASE ORAL DAILY
Qty: 30 CAPSULE | Refills: 0 | Status: SHIPPED | OUTPATIENT
Start: 2021-09-13 | End: 2021-10-12 | Stop reason: SDUPTHER

## 2021-09-13 RX ORDER — ALBUTEROL SULFATE 90 UG/1
AEROSOL, METERED RESPIRATORY (INHALATION)
Qty: 18 G | Refills: 2 | Status: SHIPPED | OUTPATIENT
Start: 2021-09-13 | End: 2021-12-08

## 2021-09-13 RX ORDER — AZITHROMYCIN 250 MG/1
TABLET, FILM COATED ORAL
Qty: 1 PACKET | Refills: 0 | Status: SHIPPED | OUTPATIENT
Start: 2021-09-13 | End: 2021-09-23

## 2021-09-13 RX ORDER — VARENICLINE TARTRATE 1 MG/1
1 TABLET, FILM COATED ORAL 2 TIMES DAILY
Qty: 180 TABLET | Refills: 1 | Status: SHIPPED | OUTPATIENT
Start: 2021-09-13 | End: 2021-10-12

## 2021-09-13 RX ORDER — BUDESONIDE AND FORMOTEROL FUMARATE DIHYDRATE 160; 4.5 UG/1; UG/1
AEROSOL RESPIRATORY (INHALATION)
Qty: 30.6 G | Refills: 3 | Status: SHIPPED | OUTPATIENT
Start: 2021-09-13 | End: 2022-07-22 | Stop reason: SDUPTHER

## 2021-09-13 RX ORDER — PREDNISONE 10 MG/1
TABLET ORAL
Qty: 20 TABLET | Refills: 0 | Status: SHIPPED | OUTPATIENT
Start: 2021-09-13 | End: 2021-09-23

## 2021-09-13 SDOH — ECONOMIC STABILITY: FOOD INSECURITY: WITHIN THE PAST 12 MONTHS, THE FOOD YOU BOUGHT JUST DIDN'T LAST AND YOU DIDN'T HAVE MONEY TO GET MORE.: NEVER TRUE

## 2021-09-13 SDOH — ECONOMIC STABILITY: FOOD INSECURITY: WITHIN THE PAST 12 MONTHS, YOU WORRIED THAT YOUR FOOD WOULD RUN OUT BEFORE YOU GOT MONEY TO BUY MORE.: NEVER TRUE

## 2021-09-13 ASSESSMENT — ENCOUNTER SYMPTOMS
SINUS PRESSURE: 1
SINUS PAIN: 1
EYE DISCHARGE: 0
WHEEZING: 0
EYE REDNESS: 0
SORE THROAT: 0
ABDOMINAL PAIN: 0
CHEST TIGHTNESS: 0
EYE ITCHING: 0
VOMITING: 0
SHORTNESS OF BREATH: 0
DIARRHEA: 0
NAUSEA: 0
TROUBLE SWALLOWING: 0
COUGH: 0

## 2021-09-13 ASSESSMENT — PATIENT HEALTH QUESTIONNAIRE - PHQ9
SUM OF ALL RESPONSES TO PHQ QUESTIONS 1-9: 0
1. LITTLE INTEREST OR PLEASURE IN DOING THINGS: 0
2. FEELING DOWN, DEPRESSED OR HOPELESS: 0
SUM OF ALL RESPONSES TO PHQ QUESTIONS 1-9: 0
SUM OF ALL RESPONSES TO PHQ QUESTIONS 1-9: 0
SUM OF ALL RESPONSES TO PHQ9 QUESTIONS 1 & 2: 0

## 2021-09-13 ASSESSMENT — SOCIAL DETERMINANTS OF HEALTH (SDOH): HOW HARD IS IT FOR YOU TO PAY FOR THE VERY BASICS LIKE FOOD, HOUSING, MEDICAL CARE, AND HEATING?: NOT HARD AT ALL

## 2021-09-13 NOTE — PROGRESS NOTES
[address] PRIMARY CARE  [address] [facility] [address]  [address] [address]  Dept: [phone]  Dept Fax: [phone]    Mingo Cook is a 48 y.o. female who presents today for her medical conditions/complaintsas noted below. Mingo Cook is c/o of Establish Care (x4-5 months leg pain) and Health Maintenance (cologuard pended, declined flu vaccine)        HPI:     Pt presents to establish care. bp stable  Weight, trending down. Pt presents to establish care. Previous pcp was dr. Hanna Nolen  Her weight has been slowly trending down. She is doing a keto pill once a day. She is eating less and more often. C/o increased sinus congestion x 1 week. She always gets a sinus infection around this time of year. She is a smoker. Quit before but when she got her Chantix there was a recall on it so she threw it away. She is interested in quitting again and trying Chantix. She also just got her Covid vaccine. She is unsure if this made her sinus congestion worse. In the past she is been treated with a Z-Burt which seems to cleared up. Crack rib 4 months ago. Flares up every now and then. She also slipped in shower. She complains of pain from her left mid buttock that radiates down to her calf. This is better if up walking around. This is different than her typical sciatic pain. Usually it will go away. She also admits to 3 different episodes of incontinence in the last 3 months which is unusual for her. She is a history of a complete hysterectomy. She goes to a chiropractor. She has been to PT in the past but was unhappy and felt like it didn't help. Hx of back surgery    She is . Patient admits the 10 mg of the Adderall is not helping. She does occasionally take a 20 mg which does seem to help.       Past Medical History:   Diagnosis Date    Acute deep vein thrombosis (DVT) of right lower extremity ([facility] [address] 2017    Acute internal jugular vein embolism ([facility] [address] 2/16/2017    Correspondence dated February 21 2017 from [employer] reviewed on 2/22/2017, see Media tab of Chart Review for more information. Correspondence dated July 19, 2017 from Dr. Nita Saleh reviewed on 7/20/17, see Media tab of Chart Review for more information. Correspondence dated August 16, 2017 from Dr. Nita Saleh reviewed on 8/18/2017, see Media tab of Chart Review for more information    Asthma     Bronchitis     DVT (deep venous thrombosis) ([facility] [address]       Past Surgical History:   Procedure Laterality Date    APPENDECTOMY      HYSTERECTOMY      TONSILLECTOMY         History reviewed. No pertinent family history. Social History     Tobacco Use    Smoking status: Current Every Day Smoker     Packs/day: 1.00     Years: 30.00     Pack years: 30.00     Types: Cigarettes    Smokeless tobacco: Never Used    Tobacco comment: smokes 1 cigarette in morning, on Chantix   Substance Use Topics    Alcohol use: Yes     Alcohol/week: 2.0 standard drinks     Types: 2 Cans of beer per week      Current Outpatient Medications   Medication Sig Dispense Refill    budesonide-formoterol (SYMBICORT) 160-4.5 MCG/ACT AERO INHALE 2 PUFFS INTO THE LUNGS TWICE DAILY 30.6 g 3    albuterol sulfate  (90 Base) MCG/ACT inhaler Inhale 1 to 2 puffs by mouth every 4 hours as needed for wheezing 18 g 2    varenicline (CHANTIX) 1 MG tablet Take 1 tablet by mouth 2 times daily 180 tablet 1    amphetamine-dextroamphetamine (ADDERALL XR) 20 MG extended release capsule Take 1 capsule by mouth daily for 30 days. 30 capsule 0    azithromycin (ZITHROMAX) 250 MG tablet 2 tablets now then 1 daily until gone.  1 packet 0    predniSONE (DELTASONE) 10 MG tablet Take 4 tablets by mouth once daily for 5 days 20 tablet 0    Multiple Vitamins-Minerals (CENTRUM SILVER PO) Take by mouth      vitamin D (ERGOCALCIFEROL) 1.25 MG (74776 UT) CAPS capsule Take 1 capsule by mouth once a week 12 capsule 1    ibuprofen (ADVIL;MOTRIN) 800 MG tablet Take 1 tablet by mouth every 8 hours as needed for Pain 180 tablet 1    aspirin 325 MG tablet Take 325 mg by mouth        No current facility-administered medications for this visit. Allergies   Allergen Reactions    Pneumovax [Pneumococcal Polysaccharide Vaccine] Swelling     Localized swelling and facial swelling    Prevnar 13  [Pneumococcal 13-Andria Conj Vacc] Swelling       Health Maintenance   Topic Date Due    Hepatitis C screen  Never done    HIV screen  Never done    Colon cancer screen colonoscopy  Never done    Flu vaccine (1) 09/01/2021    A1C test (Diabetic or Prediabetic)  01/11/2022    DTaP/Tdap/Td vaccine (2 - Td or Tdap) 05/28/2024    Lipid screen  01/11/2026    COVID-19 Vaccine  Completed    Hepatitis A vaccine  Aged Out    Hepatitis B vaccine  Aged Out    Hib vaccine  Aged Out    Meningococcal (ACWY) vaccine  Aged Out       :     Review of Systems   Constitutional: Negative for chills, fatigue and fever. HENT: Positive for congestion, sinus pressure and sinus pain. Negative for ear discharge, ear pain, sore throat and trouble swallowing. Eyes: Negative for discharge, redness and itching. Respiratory: Negative for cough, chest tightness, shortness of breath and wheezing. Cardiovascular: Negative for chest pain. Gastrointestinal: Negative for abdominal pain, diarrhea, nausea and vomiting. Genitourinary: Negative for difficulty urinating. Musculoskeletal: Negative for arthralgias and neck pain. Pain radiating down left leg    Skin: Negative for rash. Neurological: Negative for dizziness, weakness, light-headedness and headaches. All other systems reviewed and are negative. Objective:     Physical Exam  Constitutional:       Appearance: Normal appearance. She is obese. HENT:      Head: Normocephalic and atraumatic. Right Ear: Tympanic membrane normal.      Left Ear: A middle ear effusion is present.       Nose: Nose normal.      Right Sinus: No maxillary sinus tenderness or frontal sinus tenderness. Left Sinus: No maxillary sinus tenderness or frontal sinus tenderness. Eyes:      Extraocular Movements: Extraocular movements intact. Conjunctiva/sclera: Conjunctivae normal.      Pupils: Pupils are equal, round, and reactive to light. Cardiovascular:      Rate and Rhythm: Normal rate and regular rhythm. Pulses: Normal pulses. Heart sounds: Normal heart sounds. Pulmonary:      Effort: Pulmonary effort is normal.      Breath sounds: Normal breath sounds. Abdominal:      General: Abdomen is flat. Palpations: Abdomen is soft. Musculoskeletal:         General: Normal range of motion. Cervical back: Neck supple. Skin:     General: Skin is warm and dry. Capillary Refill: Capillary refill takes less than 2 seconds. Neurological:      General: No focal deficit present. Mental Status: She is alert and oriented to person, place, and time. Gait: Gait is intact. Psychiatric:         Mood and Affect: Mood normal.       /84   Pulse 94   Resp 14   Wt 199 lb 3.2 oz (90.4 kg)   LMP 06/15/2015 (Exact Date)   SpO2 93%   Breastfeeding No   BMI 33.15 kg/m²     Assessment:       Diagnosis Orders   1. Encounter to establish care     2. Mixed simple and mucopurulent chronic bronchitis (HCC)  budesonide-formoterol (SYMBICORT) 160-4.5 MCG/ACT AERO    albuterol sulfate  (90 Base) MCG/ACT inhaler   3. Screening for colon cancer  Cologuard (For External Results Only)   4. Attention deficit hyperactivity disorder (ADHD), combined type  amphetamine-dextroamphetamine (ADDERALL XR) 20 MG extended release capsule   5. Smoker  varenicline (CHANTIX) 1 MG tablet   6. Acute left-sided low back pain with left-sided sciatica  MRI LUMBAR SPINE W WO CONTRAST   7. Urinary incontinence, unspecified type  MRI LUMBAR SPINE W WO CONTRAST   8.  Acute non-recurrent sinusitis, unspecified location  azithromycin (ZITHROMAX) 250 MG tablet   9. 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 Hypercoagulable state ([facility] [address]     11. Uncomplicated asthma, unspecified asthma severity, unspecified whether persistent     12. Left otitis media with effusion         :      Return in about 1 month (around 10/13/2021) for physical .  1.  Encounter to establish care  -Reviewed previous lab work and office notes  2. Chronic bronchitis  -Discussed smoking cessation. Patient is willing to quit. Rx for Chantix  -Rx for Z-Burt to treat sinusitis  3. Screening for colon cancer  -Rx for Cologuard  4. ADHD  -Rx for Adderall. Plan to increase to 20 mg  5. Smoker  -Discussed smoking cessation. Patient is agreeable to quit. Rx for Chantix  6-7. Acute left back pain with urinary incontinence  -Rx for MRI  8. Acute nonrecurrent sinusitis  -Rx for Z-Burt  9. -10. Chronic embolism and hypercoagulable state  -Stable. Takes aspirin. [address] [facility] cardiology  11. Asthma  -Stable. Controlled with albuterol and Symbicort  12 left otitis media with effusion  -Rx for prednisone    Patient to follow-up in 1 month for a physical.  She does have paperwork to be filled out for work for physical  Orders Placed This Encounter   Procedures    Cologuard (For External Results Only)     This test is performed by an external laboratory and is used for result attachment only. It is required that this order requisition be faxed to: [employer] @ [phone]. See [email or web address] for further information.      Standing Status:   Future     Standing Expiration Date:   9/13/2022    MRI LUMBAR SPINE W WO CONTRAST     Standing Status:   Future     Standing Expiration Date:   9/13/2022     Order Specific Question:   Reason for exam:     Answer:   Left sciatic pain with urinary incontinence     Orders Placed This Encounter   Medications    budesonide-formoterol (SYMBICORT) 160-4.5 MCG/ACT AERO     Sig: INHALE 2 PUFFS INTO THE LUNGS TWICE DAILY     Dispense:  30.6 g     Refill:  3    albuterol sulfate  (90 Base) MCG/ACT inhaler     Sig: Inhale 1 to 2 puffs by mouth every 4 hours as needed for wheezing     Dispense:  18 g     Refill:  2    varenicline (CHANTIX) 1 MG tablet     Sig: Take 1 tablet by mouth 2 times daily     Dispense:  180 tablet     Refill:  1    amphetamine-dextroamphetamine (ADDERALL XR) 20 MG extended release capsule     Sig: Take 1 capsule by mouth daily for 30 days. Dispense:  30 capsule     Refill:  0    azithromycin (ZITHROMAX) 250 MG tablet     Si tablets now then 1 daily until gone. Dispense:  1 packet     Refill:  0    predniSONE (DELTASONE) 10 MG tablet     Sig: Take 4 tablets by mouth once daily for 5 days     Dispense:  20 tablet     Refill:  0       Patient given educational materials - seepatient instructions. Discussed use, benefit, and side effects of prescribed medications. All patient questions answered. Pt voiced understanding. Reviewed health maintenance. Instructed to continue current medications, diet and exercise. Patient agreedwith treatment plan. Follow up as directed.       Electronically signed by KIN Akers CNP on t 1:03 PM

## 2021-10-12 ENCOUNTER — OFFICE VISIT (OUTPATIENT)
Dept: PRIMARY CARE CLINIC | Age: 46
End: 2021-10-12
Payer: COMMERCIAL

## 2021-10-12 ENCOUNTER — TELEPHONE (OUTPATIENT)
Dept: PRIMARY CARE CLINIC | Age: 46
End: 2021-10-12

## 2021-10-12 VITALS
RESPIRATION RATE: 16 BRPM | DIASTOLIC BLOOD PRESSURE: 80 MMHG | WEIGHT: 200 LBS | HEART RATE: 86 BPM | SYSTOLIC BLOOD PRESSURE: 102 MMHG | BODY MASS INDEX: 33.32 KG/M2 | OXYGEN SATURATION: 96 % | HEIGHT: 65 IN

## 2021-10-12 DIAGNOSIS — F17.200 SMOKER: ICD-10-CM

## 2021-10-12 DIAGNOSIS — Z13.220 SCREENING FOR LIPID DISORDERS: ICD-10-CM

## 2021-10-12 DIAGNOSIS — M47.26 OSTEOARTHRITIS OF SPINE WITH RADICULOPATHY, LUMBAR REGION: ICD-10-CM

## 2021-10-12 DIAGNOSIS — F90.2 ATTENTION DEFICIT HYPERACTIVITY DISORDER (ADHD), COMBINED TYPE: Primary | ICD-10-CM

## 2021-10-12 DIAGNOSIS — M51.36 DEGENERATIVE DISC DISEASE, LUMBAR: ICD-10-CM

## 2021-10-12 PROCEDURE — 99406 BEHAV CHNG SMOKING 3-10 MIN: CPT | Performed by: NURSE PRACTITIONER

## 2021-10-12 PROCEDURE — 99214 OFFICE O/P EST MOD 30 MIN: CPT | Performed by: NURSE PRACTITIONER

## 2021-10-12 RX ORDER — PREDNISONE 10 MG/1
TABLET ORAL
Qty: 20 TABLET | Refills: 0 | Status: SHIPPED | OUTPATIENT
Start: 2021-10-12 | End: 2021-10-22

## 2021-10-12 RX ORDER — IBUPROFEN 800 MG/1
800 TABLET ORAL EVERY 8 HOURS PRN
Qty: 180 TABLET | Refills: 1 | Status: SHIPPED | OUTPATIENT
Start: 2021-10-12 | End: 2022-10-17

## 2021-10-12 RX ORDER — DEXTROAMPHETAMINE SACCHARATE, AMPHETAMINE ASPARTATE MONOHYDRATE, DEXTROAMPHETAMINE SULFATE AND AMPHETAMINE SULFATE 5; 5; 5; 5 MG/1; MG/1; MG/1; MG/1
20 CAPSULE, EXTENDED RELEASE ORAL DAILY
Qty: 30 CAPSULE | Refills: 0 | Status: SHIPPED | OUTPATIENT
Start: 2021-10-12 | End: 2021-11-11 | Stop reason: SDUPTHER

## 2021-10-12 ASSESSMENT — PATIENT HEALTH QUESTIONNAIRE - PHQ9
2. FEELING DOWN, DEPRESSED OR HOPELESS: 0
1. LITTLE INTEREST OR PLEASURE IN DOING THINGS: 0
SUM OF ALL RESPONSES TO PHQ9 QUESTIONS 1 & 2: 0
SUM OF ALL RESPONSES TO PHQ QUESTIONS 1-9: 0

## 2021-10-12 ASSESSMENT — ENCOUNTER SYMPTOMS
EYE REDNESS: 0
SINUS PRESSURE: 0
SORE THROAT: 0
TROUBLE SWALLOWING: 0
CHEST TIGHTNESS: 0
DIARRHEA: 0
SHORTNESS OF BREATH: 0
SINUS PAIN: 0
EYE DISCHARGE: 0
NAUSEA: 0
WHEEZING: 0
EYE ITCHING: 0
VOMITING: 0
COUGH: 0
ABDOMINAL PAIN: 0

## 2021-10-12 NOTE — TELEPHONE ENCOUNTER
----- Message from Quintin Haile sent at 10/12/2021 12:25 PM EDT -----  Subject: Refill Request    QUESTIONS  Name of Medication? ibuprofen (ADVIL;MOTRIN) 800 MG tablet  Patient-reported dosage and instructions? as needed   How many days do you have left? 0  Preferred Pharmacy? [facility] #[id number]  Pharmacy phone number (if available)? [phone]  ---------------------------------------------------------------------------  --------------  CALL BACK INFO  What is the best way for the office to contact you?  OK to leave message on   voicemail  Preferred Call Back Phone Number? [phone]

## 2021-10-12 NOTE — TELEPHONE ENCOUNTER
LVM letting patient know that [facility] so they would have order from us in their system.

## 2021-10-13 ENCOUNTER — HOSPITAL ENCOUNTER (OUTPATIENT)
Age: 46
Discharge: HOME | End: 2021-10-13
Payer: COMMERCIAL

## 2021-10-13 DIAGNOSIS — Z13.220 SCREENING FOR LIPID DISORDERS: ICD-10-CM

## 2021-10-13 LAB
CHOLESTEROL/HDL RATIO: 2.4
CHOLESTEROL: 211 MG/DL
GLUCOSE BLD-MCNC: 99 MG/DL (ref 70–99)
HDLC SERPL-MCNC: 87 MG/DL
LDL CHOLESTEROL: 110 MG/DL (ref 0–130)
PATIENT FASTING?: ABNORMAL
TRIGL SERPL-MCNC: 69 MG/DL
VLDLC SERPL CALC-MCNC: ABNORMAL MG/DL (ref 1–30)

## 2021-10-13 PROCEDURE — 82947 ASSAY GLUCOSE BLOOD QUANT: CPT

## 2021-10-13 PROCEDURE — 80061 LIPID PANEL: CPT

## 2021-10-13 PROCEDURE — 36415 COLL VENOUS BLD VENIPUNCTURE: CPT

## 2021-10-14 ENCOUNTER — TELEPHONE (OUTPATIENT)
Dept: PRIMARY CARE CLINIC | Age: 46
End: 2021-10-14

## 2021-10-14 NOTE — TELEPHONE ENCOUNTER
Faxed physical form to [phone]    Called pt and notified that form was faxed. Pt verbalized understanding and requested form to be faxed to their email as well. Faxed form to provided email on form.

## 2021-10-20 DIAGNOSIS — Z12.11 SCREENING FOR COLON CANCER: ICD-10-CM

## 2021-11-11 DIAGNOSIS — F90.2 ATTENTION DEFICIT HYPERACTIVITY DISORDER (ADHD), COMBINED TYPE: ICD-10-CM

## 2021-11-11 RX ORDER — DEXTROAMPHETAMINE SACCHARATE, AMPHETAMINE ASPARTATE MONOHYDRATE, DEXTROAMPHETAMINE SULFATE AND AMPHETAMINE SULFATE 5; 5; 5; 5 MG/1; MG/1; MG/1; MG/1
20 CAPSULE, EXTENDED RELEASE ORAL DAILY
Qty: 30 CAPSULE | Refills: 0 | Status: SHIPPED | OUTPATIENT
Start: 2021-11-11 | End: 2021-12-08 | Stop reason: SDUPTHER

## 2021-12-08 DIAGNOSIS — J41.8 MIXED SIMPLE AND MUCOPURULENT CHRONIC BRONCHITIS (HCC): ICD-10-CM

## 2021-12-08 DIAGNOSIS — F90.2 ATTENTION DEFICIT HYPERACTIVITY DISORDER (ADHD), COMBINED TYPE: ICD-10-CM

## 2021-12-08 RX ORDER — ALBUTEROL SULFATE 90 UG/1
AEROSOL, METERED RESPIRATORY (INHALATION)
Qty: 8.5 G | Refills: 5 | Status: SHIPPED | OUTPATIENT
Start: 2021-12-08 | End: 2022-03-10

## 2021-12-08 RX ORDER — DEXTROAMPHETAMINE SACCHARATE, AMPHETAMINE ASPARTATE MONOHYDRATE, DEXTROAMPHETAMINE SULFATE AND AMPHETAMINE SULFATE 5; 5; 5; 5 MG/1; MG/1; MG/1; MG/1
20 CAPSULE, EXTENDED RELEASE ORAL DAILY
Qty: 30 CAPSULE | Refills: 0 | Status: SHIPPED | OUTPATIENT
Start: 2021-12-08 | End: 2022-01-06 | Stop reason: SDUPTHER

## 2022-01-06 DIAGNOSIS — F90.2 ATTENTION DEFICIT HYPERACTIVITY DISORDER (ADHD), COMBINED TYPE: ICD-10-CM

## 2022-01-07 RX ORDER — DEXTROAMPHETAMINE SACCHARATE, AMPHETAMINE ASPARTATE MONOHYDRATE, DEXTROAMPHETAMINE SULFATE AND AMPHETAMINE SULFATE 5; 5; 5; 5 MG/1; MG/1; MG/1; MG/1
20 CAPSULE, EXTENDED RELEASE ORAL DAILY
Qty: 30 CAPSULE | Refills: 0 | Status: SHIPPED | OUTPATIENT
Start: 2022-01-07 | End: 2022-02-08 | Stop reason: SDUPTHER

## 2022-01-18 ENCOUNTER — OFFICE VISIT (OUTPATIENT)
Dept: PRIMARY CARE CLINIC | Age: 47
End: 2022-01-18
Payer: COMMERCIAL

## 2022-01-18 VITALS
BODY MASS INDEX: 33.91 KG/M2 | WEIGHT: 203.8 LBS | RESPIRATION RATE: 18 BRPM | HEART RATE: 86 BPM | SYSTOLIC BLOOD PRESSURE: 132 MMHG | DIASTOLIC BLOOD PRESSURE: 88 MMHG | OXYGEN SATURATION: 95 %

## 2022-01-18 DIAGNOSIS — Z13.6 ENCOUNTER FOR LIPID SCREENING FOR CARDIOVASCULAR DISEASE: ICD-10-CM

## 2022-01-18 DIAGNOSIS — M54.42 ACUTE LEFT-SIDED LOW BACK PAIN WITH LEFT-SIDED SCIATICA: Primary | ICD-10-CM

## 2022-01-18 DIAGNOSIS — R73.03 PREDIABETES: ICD-10-CM

## 2022-01-18 DIAGNOSIS — F90.2 ATTENTION DEFICIT HYPERACTIVITY DISORDER (ADHD), COMBINED TYPE: ICD-10-CM

## 2022-01-18 DIAGNOSIS — E53.8 VITAMIN B 12 DEFICIENCY: ICD-10-CM

## 2022-01-18 DIAGNOSIS — Z13.29 SCREENING FOR THYROID DISORDER: ICD-10-CM

## 2022-01-18 DIAGNOSIS — Z13.0 SCREENING FOR DEFICIENCY ANEMIA: ICD-10-CM

## 2022-01-18 DIAGNOSIS — E55.9 VITAMIN D DEFICIENCY: ICD-10-CM

## 2022-01-18 DIAGNOSIS — Z87.891 PERSONAL HISTORY OF TOBACCO USE, PRESENTING HAZARDS TO HEALTH: ICD-10-CM

## 2022-01-18 DIAGNOSIS — Z13.220 ENCOUNTER FOR LIPID SCREENING FOR CARDIOVASCULAR DISEASE: ICD-10-CM

## 2022-01-18 PROCEDURE — 99406 BEHAV CHNG SMOKING 3-10 MIN: CPT | Performed by: NURSE PRACTITIONER

## 2022-01-18 PROCEDURE — 99214 OFFICE O/P EST MOD 30 MIN: CPT | Performed by: NURSE PRACTITIONER

## 2022-01-18 RX ORDER — PREDNISONE 20 MG/1
TABLET ORAL
Qty: 18 TABLET | Refills: 0 | Status: SHIPPED | OUTPATIENT
Start: 2022-01-18 | End: 2022-01-28

## 2022-01-18 RX ORDER — VARENICLINE TARTRATE
KIT
Qty: 1 BOX | Refills: 0 | Status: SHIPPED | OUTPATIENT
Start: 2022-01-18 | End: 2022-04-04 | Stop reason: ALTCHOICE

## 2022-01-18 ASSESSMENT — ENCOUNTER SYMPTOMS
CHEST TIGHTNESS: 1
EYE ITCHING: 0
VOMITING: 0
WHEEZING: 0
COUGH: 0
ABDOMINAL PAIN: 0
EYE DISCHARGE: 0
SHORTNESS OF BREATH: 0
DIARRHEA: 0
EYE REDNESS: 0
SINUS PAIN: 0
SORE THROAT: 0
NAUSEA: 0
TROUBLE SWALLOWING: 0
SINUS PRESSURE: 0

## 2022-01-18 ASSESSMENT — PATIENT HEALTH QUESTIONNAIRE - PHQ9
SUM OF ALL RESPONSES TO PHQ QUESTIONS 1-9: 0
SUM OF ALL RESPONSES TO PHQ9 QUESTIONS 1 & 2: 0
1. LITTLE INTEREST OR PLEASURE IN DOING THINGS: 0
SUM OF ALL RESPONSES TO PHQ QUESTIONS 1-9: 0
SUM OF ALL RESPONSES TO PHQ QUESTIONS 1-9: 0
2. FEELING DOWN, DEPRESSED OR HOPELESS: 0
SUM OF ALL RESPONSES TO PHQ QUESTIONS 1-9: 0

## 2022-01-18 NOTE — PROGRESS NOTES
[address] [facility] PRIMARY CARE  [address] [facility] [address]  [address] [address]  Dept: [phone]  Dept Fax: [phone]    Willian Herron is a 48 y.o. female who presents today for her medical conditions/complaintsas noted below. Willian Herron is c/o of 3 Month Follow-Up (back pain) and Health Maintenance (declined flu vaccine)        HPI:     Pt presents for a 3 month follow up  bp stable  Weight stable    Pt presents for a 3 month follow up  She has no pain to her back. Her pain is to her left gluteal region and goes down her leg. No numbness or tingling. No loss of bowel or bladder function. No saddle anesthesia. She has been googling and found somebody online who does provide some stretches. She does plan to start the stretches. If this does not help then she would like to go to physical therapy and even possibly explore acupuncture. Ready to quit smoking. She tried wellbutrin in the past which did not work. She does not want to be on antidepressant. She would like to try Chantix. If this is not available then she would like to do the nicotine patch. She has a history of chronic bronchitis. Ever since she got her pneumonia vaccine 3 years ago she has not had bronchitis. Her chest does feel mildly tighter more than normal.  She has been using her inhaler. She would like to possibly start some steroids. Past Medical History:   Diagnosis Date    Acute deep vein thrombosis (DVT) of right lower extremity ([address] 2017    Acute internal jugular vein embolism ([address] 2017    Correspondence dated 2017 from [facility] reviewed on 2017, see Media tab of Chart Review for more information. Correspondence dated 2017 from Dr. Gaby Ferrari reviewed on 17, see Media tab of Chart Review for more information.  Correspondence dated 2017 from Dr. Gaby Ferrari reviewed on 2017, see Media tab of Chart Review for more information    Asthma     Bronchitis     DVT (deep venous thrombosis) ([facility]       Past Surgical History:   Procedure Laterality Date    APPENDECTOMY      HYSTERECTOMY      TONSILLECTOMY         History reviewed. No pertinent family history. Social History     Tobacco Use    Smoking status: Current Every Day Smoker     Packs/day: 1.00     Years: 30.00     Pack years: 30.00     Types: Cigarettes    Smokeless tobacco: Never Used    Tobacco comment: smokes 1 cigarette in morning, on Chantix   Substance Use Topics    Alcohol use: Yes     Alcohol/week: 2.0 standard drinks     Types: 2 Cans of beer per week      Current Outpatient Medications   Medication Sig Dispense Refill    varenicline (CHANTIX STARTING MONTH PAK) 0.5 MG X 11 & 1 MG X 42 tablet Take by mouth. 1 box 0    predniSONE (DELTASONE) 20 MG tablet 3 tabs x 3 days, then 2 tabs x 3 days, then 1 tab x 3 days 18 tablet 0    amphetamine-dextroamphetamine (ADDERALL XR) 20 MG extended release capsule Take 1 capsule by mouth daily for 30 days. 30 capsule 0    albuterol sulfate  (90 Base) MCG/ACT inhaler INHALE 1 TO 2 PUFFS BY MOUTH EVERY 4 HOURS AS NEEDED FOR WHEEZING 8.5 g 5    ibuprofen (ADVIL;MOTRIN) 800 MG tablet Take 1 tablet by mouth every 8 hours as needed for Pain 180 tablet 1    budesonide-formoterol (SYMBICORT) 160-4.5 MCG/ACT AERO INHALE 2 PUFFS INTO THE LUNGS TWICE DAILY 30.6 g 3    Multiple Vitamins-Minerals (CENTRUM SILVER PO) Take by mouth      aspirin 325 MG tablet Take 325 mg by mouth        No current facility-administered medications for this visit.      Allergies   Allergen Reactions    Pneumovax [Pneumococcal Polysaccharide Vaccine] Swelling     Localized swelling and facial swelling    Prevnar 13  [Pneumococcal 13-Andria Conj Vacc] Swelling       Health Maintenance   Topic Date Due    Hepatitis C screen  Never done    HIV screen  Never done    A1C test (Diabetic or Prediabetic)  01/11/2022    Flu vaccine (1) 01/18/2023 (Originally 9/1/2021)    COVID-19 Vaccine (3 - Booster for Pfizer series) 03/09/2022    Depression Screen  01/18/2023    DTaP/Tdap/Td vaccine (2 - Td or Tdap) 05/28/2024    Colon cancer screen fecal DNA test (Cologuard)  09/28/2024    Lipid screen  10/13/2026    Hepatitis A vaccine  Aged Out    Hepatitis B vaccine  Aged Out    Hib vaccine  Aged Out    Meningococcal (ACWY) vaccine  Aged Out       :     Review of Systems   Constitutional: Negative for chills, fatigue and fever. HENT: Negative for ear discharge, ear pain, sinus pressure, sinus pain, sore throat and trouble swallowing. Eyes: Negative for discharge, redness and itching. Respiratory: Positive for chest tightness. Negative for cough, shortness of breath and wheezing. Cardiovascular: Negative for chest pain. Gastrointestinal: Negative for abdominal pain, diarrhea, nausea and vomiting. Genitourinary: Negative for difficulty urinating. Musculoskeletal: Negative for arthralgias and neck pain. Left-sided sciatica   Skin: Negative for rash. Neurological: Negative for dizziness, weakness, light-headedness and headaches. All other systems reviewed and are negative. Objective:     Physical Exam  Constitutional:       Appearance: Normal appearance. She is normal weight. HENT:      Head: Normocephalic and atraumatic. Nose: Nose normal.   Eyes:      Extraocular Movements: Extraocular movements intact. Conjunctiva/sclera: Conjunctivae normal.      Pupils: Pupils are equal, round, and reactive to light. Cardiovascular:      Rate and Rhythm: Normal rate and regular rhythm. Pulses: Normal pulses. Heart sounds: Normal heart sounds. Pulmonary:      Effort: Pulmonary effort is normal.      Breath sounds: Normal breath sounds. Abdominal:      General: Abdomen is flat. Palpations: Abdomen is soft. Musculoskeletal:         General: Normal range of motion. Cervical back: Neck supple. Skin:     General: Skin is warm and dry. Capillary Refill: Capillary refill takes less than 2 seconds. Neurological:      General: No focal deficit present. Mental Status: She is alert and oriented to person, place, and time. Psychiatric:         Mood and Affect: Mood normal.       /88   Pulse 86   Resp 18   Wt 203 lb 12.8 oz (92.4 kg)   LMP 06/15/2015 (Exact Date)   SpO2 95%   Breastfeeding No   BMI 33.91 kg/m²     Assessment:       Diagnosis Orders   1. Acute left-sided low back pain with left-sided sciatica     2. Screening for deficiency anemia  CBC Auto Differential   3. Screening for thyroid disorder  TSH with Reflex   4. Encounter for lipid screening for cardiovascular disease  Lipid Panel    Comprehensive Metabolic Panel   5. Vitamin B 12 deficiency  Vitamin B12 & Folate   6. Vitamin D deficiency  Vitamin D 25 Hydroxy   7. Prediabetes  Hemoglobin A1C   8. Attention deficit hyperactivity disorder (ADHD), combined type     9. Personal history of tobacco use, presenting hazards to health  DC TOBACCO USE CESSATION INTERMEDIATE 3-10 MINUTES [74264]       :      Return in about 3 months (around 4/18/2022) for diabetes. 1.  Acute left-sided back pain with left-sided sciatica  -Patient is going to do stretches at home. If no improvement will refer to PT like physical therapy and acupuncture  -She does not feel that this is spinal in nature. Hold on MRI  2.-6. Screening  -Rx for lab work  7. Prediabetes  -Rx for lab work  8. ADHD  -Rx for Adderall refill  9. tobacco abuse  -Discussed smoking cessation. She is ready to quit. Rx for Chantix. If this is not available she would like to do a nicotine patch.   She does smoke 1 pack/day    Patient to follow-up in 3 months or sooner as needed  Orders Placed This Encounter   Procedures    CBC Auto Differential     Standing Status:   Future     Standing Expiration Date:   1/18/2023    TSH with Reflex     Standing Status:   Future Standing Expiration Date:   1/18/2023    Lipid Panel     Standing Status:   Future     Standing Expiration Date:   4/18/2022     Order Specific Question:   Is Patient Fasting?/# of Hours     Answer: Fast 8-10 hours    Comprehensive Metabolic Panel     Standing Status:   Future     Standing Expiration Date:   1/18/2023    Vitamin B12 & Folate     Standing Status:   Future     Standing Expiration Date:   1/18/2023    Vitamin D 25 Hydroxy     Standing Status:   Future     Standing Expiration Date:   1/18/2023    Hemoglobin A1C     Standing Status:   Future     Standing Expiration Date:   1/18/2023    HI TOBACCO USE CESSATION INTERMEDIATE 3-10 MINUTES [36512]     Orders Placed This Encounter   Medications    varenicline (CHANTIX STARTING MONTH PAK) 0.5 MG X 11 & 1 MG X 42 tablet     Sig: Take by mouth. Dispense:  1 box     Refill:  0    predniSONE (DELTASONE) 20 MG tablet     Sig: 3 tabs x 3 days, then 2 tabs x 3 days, then 1 tab x 3 days     Dispense:  18 tablet     Refill:  0       Patient given educational materials - seepatient instructions. Discussed use, benefit, and side effects of prescribed medications. All patient questions answered. Pt voiced understanding. Reviewed health maintenance. Instructed to continue current medications, diet and exercise. Patient agreedwith treatment plan. Follow up as directed.       Electronically signed by KIN Jordan CNP on 1/18/2022at 2:07 PM

## 2022-01-18 NOTE — PATIENT INSTRUCTIONS
Patient Education        Learning About Benefits From Quitting Smoking  How does quitting smoking make you healthier? If you're thinking about quitting smoking, you may have a few reasons to be smoke-free. Your health may be one of them. · When you quit smoking, you lower your risks for cancer, lung disease, heart attack, stroke, blood vessel disease, and blindness from macular degeneration. · When you're smoke-free, you get sick less often, and you heal faster. You are less likely to get colds, flu, bronchitis, and pneumonia. · As a nonsmoker, you may find that your mood is better and you are less stressed. When and how will you feel healthier? Quitting has real health benefits that start from day 1 of being smoke-free. And the longer you stay smoke-free, the healthier you get and the better you feel. The first hours  · After just 20 minutes, your blood pressure and heart rate go down. That means there's less stress on your heart and blood vessels. · Within 12 hours, the level of carbon monoxide in your blood drops back to normal. That makes room for more oxygen. With more oxygen in your body, you may notice that you have more energy than when you smoked. After 2 weeks  · Your lungs start to work better. · Your risk of heart attack starts to drop. After 1 month  · When your lungs are clear, you cough less and breathe deeper, so it's easier to be active. · Your sense of taste and smell return. That means you can enjoy food more than you have since you started smoking. Over the years  · Over the years, your risks of heart disease, heart attack, and stroke are lower. · After 10 years, your risk of dying from lung cancer is cut by about half. And your risk for many other types of cancer is lower too. How would quitting help others in your life? When you quit smoking, you improve the health of everyone who now breathes in your smoke.   · Their heart, lung, and cancer risks drop, much like yours.  · They are sick less. For babies and small children, living smoke-free means they're less likely to have ear infections, pneumonia, and bronchitis. · If you're a woman who is or will be pregnant someday, quitting smoking means a healthier . · Children who are close to you are less likely to become adult smokers. Where can you learn more? Go to https://GrownOutpepiceweb.ShareSDK. org and sign in to your [employer] account. Enter [id number] [id number] in the [facility] box to learn more about \"Learning About Benefits From Quitting Smoking. \"     If you do not have an account, please click on the \"Sign Up Now\" link. Current as of: 2021               Content Version: 13.1  © 1503-5217 Healthwise, [employer]. Care instructions adapted under license by [facility] ([facility]). If you have questions about a medical condition or this instruction, always ask your healthcare professional. [place] [address] any warranty or liability for your use of this information. Patient Education        Stopping Smoking: Care Instructions  Your Care Instructions     Cigarette smokers crave the nicotine in cigarettes. Giving it up is much harder than simply changing a habit. Your body has to stop craving the nicotine. It is hard to quit, but you can do it. There are many tools that people use to quit smoking. You may find that combining tools works best for you. There are several steps to quitting. First you get ready to quit. Then you get support to help you. After that, you learn new skills and behaviors to become a nonsmoker. For many people, a necessary step is getting and using medicine. Your doctor will help you set up the plan that best meets your needs. You may want to attend a smoking cessation program to help you quit smoking. When you choose a program, look for one that has proven success. Ask your doctor for ideas.  You will greatly increase your chances of success if you take medicine as well as get counseling or join a cessation program.  Some of the changes you feel when you first quit tobacco are uncomfortable. Your body will miss the nicotine at first, and you may feel short-tempered and grumpy. You may have trouble sleeping or concentrating. Medicine can help you deal with these symptoms. You may struggle with changing your smoking habits and rituals. The last step is the tricky one: Be prepared for the smoking urge to continue for a time. This is a lot to deal with, but keep at it. You will feel better. Follow-up care is a key part of your treatment and safety. Be sure to make and go to all appointments, and call your doctor if you are having problems. It's also a good idea to know your test results and keep a list of the medicines you take. How can you care for yourself at home? · Ask your family, friends, and coworkers for support. You have a better chance of quitting if you have help and support. · Join a support group, such as Nicotine Anonymous, for people who are trying to quit smoking. · Consider signing up for a smoking cessation program, such as the American Lung Association's Freedom from Smoking program.  · Get text messaging support. Go to the website at www.smokefree. gov to sign up for the [facility] program.  · Set a quit date. Pick your date carefully so that it is not right in the middle of a big deadline or stressful time. Once you quit, do not even take a puff. Get rid of all ashtrays and lighters after your last cigarette. Clean your house and your clothes so that they do not smell of smoke. · Learn how to be a nonsmoker. Think about ways you can avoid those things that make you reach for a cigarette. ? Avoid situations that put you at greatest risk for smoking. For some people, it is hard to have a drink with friends without smoking. For others, they might skip a coffee break with coworkers who smoke. ? Change your daily routine.  Take a different route to work or eat a meal in a different place. · Cut down on stress. Calm yourself or release tension by doing an activity you enjoy, such as reading a book, taking a hot bath, or gardening. · Talk to your doctor or pharmacist about nicotine replacement therapy, which replaces the nicotine in your body. You still get nicotine but you do not use tobacco. Nicotine replacement products help you slowly reduce the amount of nicotine you need. These products come in several forms, many of them available over-the-counter:  ? Nicotine patches  ? Nicotine gum and lozenges  ? Nicotine inhaler  · Ask your doctor about bupropion (Wellbutrin) or varenicline (Chantix), which are prescription medicines. They do not contain nicotine. They help you by reducing withdrawal symptoms, such as stress and anxiety. · Some people find hypnosis, acupuncture, and massage helpful for ending the smoking habit. · Eat a healthy diet and get regular exercise. Having healthy habits will help your body move past its craving for nicotine. · Be prepared to keep trying. Most people are not successful the first few times they try to quit. Do not get mad at yourself if you smoke again. Make a list of things you learned and think about when you want to try again, such as next week, next month, or next year. Where can you learn more? Go to https://[employer].Emida. org and sign in to your [employer] account. Enter F833 in the [facility] box to learn more about \"Stopping Smoking: Care Instructions. \"     If you do not have an account, please click on the \"Sign Up Now\" link. Current as of: February 11, 2021               Content Version: 13.1  © 2788-0460 Healthwise, [employer]. Care instructions adapted under license by [facility] ([facility]).  If you have questions about a medical condition or this instruction, always ask your healthcare professional. [address] any warranty or liability for your use of this information.

## 2022-01-19 ENCOUNTER — TELEPHONE (OUTPATIENT)
Dept: PRIMARY CARE CLINIC | Age: 47
End: 2022-01-19

## 2022-01-19 RX ORDER — NICOTINE 21 MG/24HR
1 PATCH, TRANSDERMAL 24 HOURS TRANSDERMAL DAILY
Qty: 42 PATCH | Refills: 0 | Status: SHIPPED | OUTPATIENT
Start: 2022-01-19 | End: 2022-04-04 | Stop reason: ALTCHOICE

## 2022-01-21 ENCOUNTER — HOSPITAL ENCOUNTER (OUTPATIENT)
Age: 47
Discharge: HOME | End: 2022-01-21
Payer: COMMERCIAL

## 2022-01-21 DIAGNOSIS — R73.03 PREDIABETES: ICD-10-CM

## 2022-01-21 DIAGNOSIS — Z13.29 SCREENING FOR THYROID DISORDER: ICD-10-CM

## 2022-01-21 DIAGNOSIS — Z13.220 ENCOUNTER FOR LIPID SCREENING FOR CARDIOVASCULAR DISEASE: ICD-10-CM

## 2022-01-21 DIAGNOSIS — Z13.0 SCREENING FOR DEFICIENCY ANEMIA: ICD-10-CM

## 2022-01-21 DIAGNOSIS — E53.8 VITAMIN B 12 DEFICIENCY: ICD-10-CM

## 2022-01-21 DIAGNOSIS — Z13.6 ENCOUNTER FOR LIPID SCREENING FOR CARDIOVASCULAR DISEASE: ICD-10-CM

## 2022-01-21 DIAGNOSIS — E55.9 VITAMIN D DEFICIENCY: ICD-10-CM

## 2022-01-21 LAB
ABSOLUTE EOS #: 0.09 K/UL (ref 0–0.44)
ABSOLUTE IMMATURE GRANULOCYTE: 0.03 K/UL (ref 0–0.3)
ABSOLUTE LYMPH #: 3.2 K/UL (ref 1.1–3.7)
ABSOLUTE MONO #: 0.83 K/UL (ref 0.1–1.2)
ALBUMIN SERPL-MCNC: 4.3 G/DL (ref 3.5–5.2)
ALBUMIN/GLOBULIN RATIO: 1.7 (ref 1–2.5)
ALP BLD-CCNC: 89 U/L (ref 35–104)
ALT SERPL-CCNC: 19 U/L (ref 5–33)
ANION GAP SERPL CALCULATED.3IONS-SCNC: 10 MMOL/L (ref 9–17)
AST SERPL-CCNC: 16 U/L
BASOPHILS # BLD: 0 % (ref 0–2)
BASOPHILS ABSOLUTE: 0.04 K/UL (ref 0–0.2)
BILIRUB SERPL-MCNC: 0.3 MG/DL (ref 0.3–1.2)
BUN BLDV-MCNC: 13 MG/DL (ref 6–20)
BUN/CREAT BLD: NORMAL (ref 9–20)
CALCIUM SERPL-MCNC: 9.5 MG/DL (ref 8.6–10.4)
CHLORIDE BLD-SCNC: 105 MMOL/L (ref 98–107)
CHOLESTEROL/HDL RATIO: 2.4
CHOLESTEROL: 194 MG/DL
CO2: 25 MMOL/L (ref 20–31)
CREAT SERPL-MCNC: 0.71 MG/DL (ref 0.5–0.9)
DIFFERENTIAL TYPE: ABNORMAL
EOSINOPHILS RELATIVE PERCENT: 1 % (ref 1–4)
ESTIMATED AVERAGE GLUCOSE: 120 MG/DL
GFR AFRICAN AMERICAN: >60 ML/MIN
GFR NON-AFRICAN AMERICAN: >60 ML/MIN
GFR SERPL CREATININE-BSD FRML MDRD: NORMAL ML/MIN/{1.73_M2}
GFR SERPL CREATININE-BSD FRML MDRD: NORMAL ML/MIN/{1.73_M2}
GLUCOSE BLD-MCNC: 96 MG/DL (ref 70–99)
HBA1C MFR BLD: 5.8 % (ref 4–6)
HCT VFR BLD CALC: 46.3 % (ref 36.3–47.1)
HDLC SERPL-MCNC: 82 MG/DL
HEMOGLOBIN: 14.9 G/DL (ref 11.9–15.1)
IMMATURE GRANULOCYTES: 0 %
LDL CHOLESTEROL: 99 MG/DL (ref 0–130)
LYMPHOCYTES # BLD: 27 % (ref 24–43)
MCH RBC QN AUTO: 31.5 PG (ref 25.2–33.5)
MCHC RBC AUTO-ENTMCNC: 32.2 G/DL (ref 28.4–34.8)
MCV RBC AUTO: 97.9 FL (ref 82.6–102.9)
MONOCYTES # BLD: 7 % (ref 3–12)
NRBC AUTOMATED: 0 PER 100 WBC
PDW BLD-RTO: 15.3 % (ref 11.8–14.4)
PLATELET # BLD: 294 K/UL (ref 138–453)
PLATELET ESTIMATE: ABNORMAL
PMV BLD AUTO: 10.3 FL (ref 8.1–13.5)
POTASSIUM SERPL-SCNC: 5 MMOL/L (ref 3.7–5.3)
RBC # BLD: 4.73 M/UL (ref 3.95–5.11)
RBC # BLD: ABNORMAL 10*6/UL
SEG NEUTROPHILS: 65 % (ref 36–65)
SEGMENTED NEUTROPHILS ABSOLUTE COUNT: 7.87 K/UL (ref 1.5–8.1)
SODIUM BLD-SCNC: 140 MMOL/L (ref 135–144)
TOTAL PROTEIN: 6.9 G/DL (ref 6.4–8.3)
TRIGL SERPL-MCNC: 66 MG/DL
TSH SERPL DL<=0.05 MIU/L-ACNC: 0.88 MIU/L (ref 0.3–5)
VITAMIN D 25-HYDROXY: 23.7 NG/ML (ref 30–100)
VLDLC SERPL CALC-MCNC: NORMAL MG/DL (ref 1–30)
WBC # BLD: 12.1 K/UL (ref 3.5–11.3)
WBC # BLD: ABNORMAL 10*3/UL

## 2022-01-21 PROCEDURE — 82746 ASSAY OF FOLIC ACID SERUM: CPT

## 2022-01-21 PROCEDURE — 80053 COMPREHEN METABOLIC PANEL: CPT

## 2022-01-21 PROCEDURE — 82607 VITAMIN B-12: CPT

## 2022-01-21 PROCEDURE — 82306 VITAMIN D 25 HYDROXY: CPT

## 2022-01-21 PROCEDURE — 85025 COMPLETE CBC W/AUTO DIFF WBC: CPT

## 2022-01-21 PROCEDURE — 80061 LIPID PANEL: CPT

## 2022-01-21 PROCEDURE — 83036 HEMOGLOBIN GLYCOSYLATED A1C: CPT

## 2022-01-21 PROCEDURE — 84443 ASSAY THYROID STIM HORMONE: CPT

## 2022-01-21 PROCEDURE — 36415 COLL VENOUS BLD VENIPUNCTURE: CPT

## 2022-01-22 LAB
FOLATE: 6.6 NG/ML
VITAMIN B-12: 598 PG/ML (ref 232–1245)

## 2022-03-10 DIAGNOSIS — J41.8 MIXED SIMPLE AND MUCOPURULENT CHRONIC BRONCHITIS (HCC): ICD-10-CM

## 2022-03-10 RX ORDER — ALBUTEROL SULFATE 90 UG/1
AEROSOL, METERED RESPIRATORY (INHALATION)
Qty: 8.5 G | Refills: 5 | Status: SHIPPED | OUTPATIENT
Start: 2022-03-10 | End: 2022-06-08

## 2022-04-05 ENCOUNTER — HOSPITAL ENCOUNTER (OUTPATIENT)
Age: 47
Discharge: HOME | End: 2022-04-05
Payer: COMMERCIAL

## 2022-04-05 DIAGNOSIS — N62 GYNECOMASTIA, MALE: ICD-10-CM

## 2022-04-05 LAB
TESTOSTERONE TOTAL: 21 NG/DL (ref 20–70)
THYROXINE, FREE: 1.42 NG/DL (ref 0.93–1.7)
TSH SERPL DL<=0.05 MIU/L-ACNC: 1.93 UIU/ML (ref 0.3–5)

## 2022-04-05 PROCEDURE — 84439 ASSAY OF FREE THYROXINE: CPT

## 2022-04-05 PROCEDURE — 82671 ASSAY OF ESTROGENS: CPT

## 2022-04-05 PROCEDURE — 36415 COLL VENOUS BLD VENIPUNCTURE: CPT

## 2022-04-05 PROCEDURE — 84403 ASSAY OF TOTAL TESTOSTERONE: CPT

## 2022-04-05 PROCEDURE — 84443 ASSAY THYROID STIM HORMONE: CPT

## 2022-04-18 ENCOUNTER — OFFICE VISIT (OUTPATIENT)
Dept: PRIMARY CARE CLINIC | Age: 47
End: 2022-04-18
Payer: COMMERCIAL

## 2022-04-18 VITALS
HEIGHT: 65 IN | OXYGEN SATURATION: 96 % | HEART RATE: 90 BPM | WEIGHT: 206 LBS | SYSTOLIC BLOOD PRESSURE: 120 MMHG | BODY MASS INDEX: 34.32 KG/M2 | DIASTOLIC BLOOD PRESSURE: 82 MMHG | RESPIRATION RATE: 16 BRPM

## 2022-04-18 DIAGNOSIS — F90.2 ATTENTION DEFICIT HYPERACTIVITY DISORDER (ADHD), COMBINED TYPE: ICD-10-CM

## 2022-04-18 DIAGNOSIS — Z87.891 PERSONAL HISTORY OF TOBACCO USE, PRESENTING HAZARDS TO HEALTH: ICD-10-CM

## 2022-04-18 DIAGNOSIS — E55.9 VITAMIN D DEFICIENCY: ICD-10-CM

## 2022-04-18 DIAGNOSIS — R73.03 PREDIABETES: Primary | ICD-10-CM

## 2022-04-18 PROCEDURE — 99406 BEHAV CHNG SMOKING 3-10 MIN: CPT | Performed by: NURSE PRACTITIONER

## 2022-04-18 PROCEDURE — 99214 OFFICE O/P EST MOD 30 MIN: CPT | Performed by: NURSE PRACTITIONER

## 2022-04-18 RX ORDER — ASPIRIN 81 MG
1 TABLET, DELAYED RELEASE (ENTERIC COATED) ORAL DAILY
Qty: 90 TABLET | Refills: 1 | Status: SHIPPED | OUTPATIENT
Start: 2022-04-18

## 2022-04-18 ASSESSMENT — ENCOUNTER SYMPTOMS
VOMITING: 0
DIARRHEA: 0
SHORTNESS OF BREATH: 0
EYE DISCHARGE: 0
CHEST TIGHTNESS: 0
EYE REDNESS: 0
EYE ITCHING: 0
ABDOMINAL PAIN: 0
NAUSEA: 0
COUGH: 0
SORE THROAT: 0
SINUS PRESSURE: 0
SINUS PAIN: 0
TROUBLE SWALLOWING: 0
WHEEZING: 0

## 2022-04-18 NOTE — PROGRESS NOTES
[facility] PRIMARY [facility]  [address] [facility] [address]  [address] [address]  Dept: [phone]  Dept Fax: [phone]    Shelley Merlos is a 48 y.o. female who presents today for her medical conditions/complaintsas noted below. Shelley Merlos is c/o of Diabetes and Discuss Medications (Vitamin D)        HPI:     Pt presents for 3-month follow-up  Blood pressure stable  Weight is stable    Patient presents for 3-month follow-up. She states that she is doing well. She is still Smoking and vaping on and off. 1 pack will last her 3 days. She is cutting back. Her wife is still vaping as well. She is trying to quit however. Saw cardiology at the beginning of the month. She has been complaining of these increased frequency hot flashes that causes her face to turn red. Is now happening multiple times throughout the day. He did order estrogen and testosterone levels. Her estrogen level is still pending. She cannot take estrogen due to her history of blood clots    She is watching her diet. Cutting down on carbs. She is trying to make small adaptable changes. She is staying active. Adderall is working well for her. She denies any concerns. Otherwise doing well. Past Medical History:   Diagnosis Date    Acute deep vein thrombosis (DVT) of right lower extremity ([address] 2017    Acute internal jugular vein embolism ([address] 2017    Correspondence dated 2017 from [facility] reviewed on 2017, see Media tab of Chart Review for more information. Correspondence dated 2017 from Dr. Kelly Clement reviewed on 17, see Media tab of Chart Review for more information.  Correspondence dated 2017 from Dr. Kelly Clement reviewed on 2017, see Media tab of Chart Review for more information    Asthma     Bronchitis     DVT (deep venous thrombosis) ([address]       Past Surgical History:   Procedure Laterality Date    APPENDECTOMY      HYSTERECTOMY      TONSILLECTOMY         History reviewed. No pertinent family history. Social History     Tobacco Use    Smoking status: Current Every Day Smoker     Packs/day: 1.00     Years: 30.00     Pack years: 30.00     Types: Cigarettes    Smokeless tobacco: Never Used    Tobacco comment: smokes 1 cigarette in morning, on Chantix   Substance Use Topics    Alcohol use: Yes     Alcohol/week: 2.0 standard drinks     Types: 2 Cans of beer per week      Current Outpatient Medications   Medication Sig Dispense Refill    Calcium Carbonate+Vitamin D 600-200 MG-UNIT TABS Take 1 tablet by mouth daily 90 tablet 1    albuterol sulfate  (90 Base) MCG/ACT inhaler INHALE 1 TO 2 PUFFS BY MOUTH EVERY 4 HOURS AS NEEDED FOR WHEEZING 8.5 g 5    amphetamine-dextroamphetamine (ADDERALL XR) 20 MG extended release capsule Take 1 capsule by mouth daily for 90 days. 90 capsule 0    budesonide-formoterol (SYMBICORT) 160-4.5 MCG/ACT AERO INHALE 2 PUFFS INTO THE LUNGS TWICE DAILY 30.6 g 3    Multiple Vitamins-Minerals (CENTRUM SILVER PO) Take by mouth      aspirin 325 MG tablet Take 325 mg by mouth       ibuprofen (ADVIL;MOTRIN) 800 MG tablet Take 1 tablet by mouth every 8 hours as needed for Pain 180 tablet 1     No current facility-administered medications for this visit.      Allergies   Allergen Reactions    Pneumovax [Pneumococcal Polysaccharide Vaccine] Swelling     Localized swelling and facial swelling    Prevnar 13  [Pneumococcal 13-Andria Conj Vacc] Swelling       Health Maintenance   Topic Date Due    Hepatitis C screen  Never done    HIV screen  Never done    COVID-19 Vaccine (3 - Booster for Pfizer series) 02/09/2022    Flu vaccine (Season Ended) 01/18/2023 (Originally 9/1/2022)    Depression Screen  01/18/2023    A1C test (Diabetic or Prediabetic)  01/21/2023    DTaP/Tdap/Td vaccine (2 - Td or Tdap) 05/28/2024    Colorectal Cancer Screen  09/28/2024    Lipid screen  01/21/2027    Hepatitis A vaccine  Aged Out    Hepatitis B vaccine  Aged Out    Hib vaccine  Aged Out    Meningococcal (ACWY) vaccine  Aged Out       :     Review of Systems   Constitutional: Negative for chills, fatigue and fever. HENT: Negative for ear discharge, ear pain, sinus pressure, sinus pain, sore throat and trouble swallowing. Eyes: Negative for discharge, redness and itching. Respiratory: Negative for cough, chest tightness, shortness of breath and wheezing. Cardiovascular: Negative for chest pain. Gastrointestinal: Negative for abdominal pain, diarrhea, nausea and vomiting. Genitourinary: Negative for difficulty urinating. Musculoskeletal: Negative for arthralgias and neck pain. Skin: Negative for rash. Neurological: Negative for dizziness, weakness, light-headedness and headaches. All other systems reviewed and are negative. Objective:     Physical Exam  Constitutional:       Appearance: Normal appearance. She is obese. HENT:      Head: Normocephalic and atraumatic. Nose: Nose normal.   Eyes:      Extraocular Movements: Extraocular movements intact. Conjunctiva/sclera: Conjunctivae normal.      Pupils: Pupils are equal, round, and reactive to light. Cardiovascular:      Rate and Rhythm: Normal rate and regular rhythm. Pulses: Normal pulses. Heart sounds: Normal heart sounds. Pulmonary:      Effort: Pulmonary effort is normal.      Breath sounds: Normal breath sounds. Abdominal:      General: Abdomen is flat. Palpations: Abdomen is soft. Musculoskeletal:         General: Normal range of motion. Cervical back: Neck supple. Skin:     General: Skin is warm and dry. Capillary Refill: Capillary refill takes less than 2 seconds. Neurological:      General: No focal deficit present. Mental Status: She is alert and oriented to person, place, and time.    Psychiatric:         Mood and Affect: Mood normal.       /82 (Site: Left Upper Arm, Position: Sitting, Cuff Size: Medium Adult)   Pulse 90   Resp 16   Ht 5' 5\" (1.651 m)   Wt 206 lb (93.4 kg)   LMP 06/15/2015 (Exact Date)   SpO2 96%   Breastfeeding No   BMI 34.28 kg/m²     Assessment:       Diagnosis Orders   1. Prediabetes  Hemoglobin A1C   2. Attention deficit hyperactivity disorder (ADHD), combined type     3. Vitamin D deficiency     4. Personal history of tobacco use, presenting hazards to health  VT TOBACCO USE CESSATION INTERMEDIATE 3-10 MINUTES [34218]       :      Return in about 14 weeks (around 7/25/2022) for prediabetes. 1.  Prediabetes  -Patient is not due to have her A1c checked for another 3 days. Order placed for A1c to be checked. -Diet controlled. She is to continue making dietary modifications and staying physically active  2. ADHD  -Continue with Adderall 20 mg extended release daily  3. Vitamin D deficiency  -Rx for calcium carbonate plus vitamin D 1 tab daily  4. Tobacco abuse  -Discussed smoking cessation. She is trying to cut back slowly. She Ultrase between smoking cigarettes and vaping. She is actively trying to quit. Patient to follow-up in 3 months or sooner as needed  Orders Placed This Encounter   Procedures    Hemoglobin A1C     Standing Status:   Future     Standing Expiration Date:   4/18/2023    VT TOBACCO USE CESSATION INTERMEDIATE 3-10 MINUTES [[id number]]     Orders Placed This Encounter   Medications    Calcium Carbonate+Vitamin D 600-200 MG-UNIT TABS     Sig: Take 1 tablet by mouth daily     Dispense:  90 tablet     Refill:  1       Patient given educational materials - seepatient instructions. Discussed use, benefit, and side effects of prescribed medications. All patient questions answered. Pt voiced understanding. Reviewed health maintenance. Instructed to continue current medications, diet and exercise. Patient agreedwith treatment plan. Follow up as directed.       Electronically signed by KIN Sawant CNP on 4/18/2022at 11:40 AM            Tobacco Cessation Counseling: Patient advised about behavior change, including information about personal health harms, usage of appropriate cessation measures and benefits of cessation.   Time spent (minutes): 3-10 minutes

## 2022-04-20 LAB
ESTRADIOL LEVEL: 4 PG/ML
ESTROGEN TOTAL: 23.2 PG/ML
ESTRONE: 19.2 PG/ML

## 2022-04-26 ENCOUNTER — HOSPITAL ENCOUNTER (OUTPATIENT)
Age: 47
Discharge: HOME | End: 2022-04-26
Payer: COMMERCIAL

## 2022-04-26 DIAGNOSIS — R73.03 PREDIABETES: ICD-10-CM

## 2022-04-26 LAB
ESTIMATED AVERAGE GLUCOSE: 117 MG/DL
HBA1C MFR BLD: 5.7 % (ref 4–6)

## 2022-04-26 PROCEDURE — 83036 HEMOGLOBIN GLYCOSYLATED A1C: CPT

## 2022-04-26 PROCEDURE — 36415 COLL VENOUS BLD VENIPUNCTURE: CPT

## 2022-05-02 DIAGNOSIS — F90.2 ATTENTION DEFICIT HYPERACTIVITY DISORDER (ADHD), COMBINED TYPE: ICD-10-CM

## 2022-05-02 RX ORDER — DEXTROAMPHETAMINE SACCHARATE, AMPHETAMINE ASPARTATE MONOHYDRATE, DEXTROAMPHETAMINE SULFATE AND AMPHETAMINE SULFATE 5; 5; 5; 5 MG/1; MG/1; MG/1; MG/1
20 CAPSULE, EXTENDED RELEASE ORAL DAILY
Qty: 90 CAPSULE | Refills: 0 | Status: SHIPPED | OUTPATIENT
Start: 2022-05-02 | End: 2022-07-22 | Stop reason: SDUPTHER

## 2022-06-08 DIAGNOSIS — J41.8 MIXED SIMPLE AND MUCOPURULENT CHRONIC BRONCHITIS (HCC): ICD-10-CM

## 2022-06-08 RX ORDER — ALBUTEROL SULFATE 90 UG/1
AEROSOL, METERED RESPIRATORY (INHALATION)
Qty: 8.5 G | Refills: 5 | Status: SHIPPED | OUTPATIENT
Start: 2022-06-08

## 2022-06-09 ENCOUNTER — OFFICE VISIT (OUTPATIENT)
Dept: PRIMARY CARE CLINIC | Age: 47
End: 2022-06-09
Payer: COMMERCIAL

## 2022-06-09 VITALS
WEIGHT: 200.6 LBS | DIASTOLIC BLOOD PRESSURE: 86 MMHG | SYSTOLIC BLOOD PRESSURE: 130 MMHG | OXYGEN SATURATION: 97 % | BODY MASS INDEX: 33.38 KG/M2 | HEART RATE: 82 BPM | RESPIRATION RATE: 16 BRPM

## 2022-06-09 DIAGNOSIS — M54.42 ACUTE LEFT-SIDED LOW BACK PAIN WITH LEFT-SIDED SCIATICA: ICD-10-CM

## 2022-06-09 DIAGNOSIS — Z86.718 HX OF BLOOD CLOTS: ICD-10-CM

## 2022-06-09 DIAGNOSIS — R06.2 WHEEZING: ICD-10-CM

## 2022-06-09 DIAGNOSIS — J30.9 ALLERGIC RHINITIS, UNSPECIFIED SEASONALITY, UNSPECIFIED TRIGGER: ICD-10-CM

## 2022-06-09 DIAGNOSIS — M79.662 PAIN OF LEFT CALF: Primary | ICD-10-CM

## 2022-06-09 DIAGNOSIS — M54.2 NECK PAIN: ICD-10-CM

## 2022-06-09 DIAGNOSIS — R07.9 LEFT-SIDED CHEST PAIN: ICD-10-CM

## 2022-06-09 DIAGNOSIS — Z87.891 PERSONAL HISTORY OF TOBACCO USE, PRESENTING HAZARDS TO HEALTH: ICD-10-CM

## 2022-06-09 PROCEDURE — 99406 BEHAV CHNG SMOKING 3-10 MIN: CPT | Performed by: NURSE PRACTITIONER

## 2022-06-09 PROCEDURE — 99215 OFFICE O/P EST HI 40 MIN: CPT | Performed by: NURSE PRACTITIONER

## 2022-06-09 RX ORDER — AZELASTINE 1 MG/ML
2 SPRAY, METERED NASAL 2 TIMES DAILY
Qty: 120 ML | Refills: 1 | Status: SHIPPED | OUTPATIENT
Start: 2022-06-09

## 2022-06-09 RX ORDER — PREDNISONE 10 MG/1
TABLET ORAL
Qty: 20 TABLET | Refills: 0 | Status: SHIPPED | OUTPATIENT
Start: 2022-06-09 | End: 2022-06-19

## 2022-06-09 RX ORDER — TIOTROPIUM BROMIDE 18 UG/1
18 CAPSULE ORAL; RESPIRATORY (INHALATION) DAILY
Qty: 90 CAPSULE | Refills: 1 | Status: SHIPPED | OUTPATIENT
Start: 2022-06-09 | End: 2022-08-25 | Stop reason: SDUPTHER

## 2022-06-09 RX ORDER — FLUTICASONE PROPIONATE 50 MCG
2 SPRAY, SUSPENSION (ML) NASAL DAILY
Qty: 16 G | Refills: 5 | Status: SHIPPED | OUTPATIENT
Start: 2022-06-09 | End: 2022-10-17 | Stop reason: ALTCHOICE

## 2022-06-09 RX ORDER — FEXOFENADINE HCL 180 MG/1
180 TABLET ORAL DAILY
Qty: 30 TABLET | Refills: 5 | Status: SHIPPED | OUTPATIENT
Start: 2022-06-09

## 2022-06-09 ASSESSMENT — PATIENT HEALTH QUESTIONNAIRE - PHQ9
SUM OF ALL RESPONSES TO PHQ QUESTIONS 1-9: 0
SUM OF ALL RESPONSES TO PHQ QUESTIONS 1-9: 0
2. FEELING DOWN, DEPRESSED OR HOPELESS: 0
1. LITTLE INTEREST OR PLEASURE IN DOING THINGS: 0
SUM OF ALL RESPONSES TO PHQ QUESTIONS 1-9: 0
SUM OF ALL RESPONSES TO PHQ9 QUESTIONS 1 & 2: 0
SUM OF ALL RESPONSES TO PHQ QUESTIONS 1-9: 0

## 2022-06-09 ASSESSMENT — ENCOUNTER SYMPTOMS
VOMITING: 0
COUGH: 0
SINUS PRESSURE: 0
SINUS PAIN: 0
BACK PAIN: 1
CHEST TIGHTNESS: 0
WHEEZING: 0
DIARRHEA: 0
SHORTNESS OF BREATH: 0
EYE ITCHING: 0
SORE THROAT: 1
TROUBLE SWALLOWING: 0
EYE REDNESS: 0
ABDOMINAL PAIN: 0
NAUSEA: 0
EYE DISCHARGE: 0

## 2022-06-09 NOTE — PROGRESS NOTES
[facility] PRIMARY CARE  [facility] [address] [facility] [address]  [address] [address]  Dept: [phone]  Dept Fax: [phone]    Nakita Holloway is a 48 y.o. female who presents today for her medical conditions/complaintsas noted below. Nakita Holloway is c/o of Swelling (bilateral hand and feet swelling x1 month, notices more at night) and Chest Pain (cracked rib about a year ago, having pain on left side of torso)        HPI:     Pt presents for an office visit  bp stable  Weight is down 6 lbs    Patient presents for an office visit with multiple concerns. She is suppose to have a scan every other year to check her blood clots. She had a blood clot in her neck previously. She is only on aspirin. She is no longer on anticoagulation. She used to follow a specialist but now just follows with cardiology. She doest feel right again. Its in her neck like last time. Her face is getting hot and sweating  Hands and feet are swelling     Voice keeps going in and out since sinus infection   Scratchy throat frequently and sometimes wakes her up at night. She is not on any medication for her allergies    Cracked rib last year. Still hurts. The pain is to her left lower ribs. She was also told that the left lower part of her heart does not work right. She is not sure if this is related to her heart or the ribs. She does continue to smoke. She is trying to cut back. She is going to start vaping. Sciatic issues still. Works through it. constant pian in left lower calf. Sciatic goes more to the side of the leg down to the foot. No loss of bowel or bladder control. No saddle anesthesia. MRI was ordered but not done yet. She has been doing exercises which is actually helping with her back.   She is more concerned about the calf pain which is different from her sciatic pain        Past Medical History:   Diagnosis Date    Acute deep vein thrombosis (DVT) of right lower extremity ([facility] 2/16/2017    Acute internal jugular vein embolism ([facility] 2/16/2017    Correspondence dated February 21 2017 from [facility] reviewed on 2/22/2017, see Media tab of Chart Review for more information. Correspondence dated July 19, 2017 from Dr. Dee Mcgarry reviewed on 7/20/17, see Media tab of Chart Review for more information. Correspondence dated August 16, 2017 from Dr. Dee Mcgarry reviewed on 8/18/2017, see Media tab of Chart Review for more information    Asthma     Bronchitis     DVT (deep venous thrombosis) ([facility] [address] [facility]       Past Surgical History:   Procedure Laterality Date    APPENDECTOMY      HYSTERECTOMY (CERVIX STATUS UNKNOWN)      TONSILLECTOMY         History reviewed. No pertinent family history. Social History     Tobacco Use    Smoking status: Current Every Day Smoker     Packs/day: 1.00     Years: 30.00     Pack years: 30.00     Types: Cigarettes    Smokeless tobacco: Never Used    Tobacco comment: smokes 1 cigarette in morning, on Chantix   Substance Use Topics    Alcohol use: Yes     Alcohol/week: 2.0 standard drinks     Types: 2 Cans of beer per week      Current Outpatient Medications   Medication Sig Dispense Refill    fluticasone (FLONASE) 50 MCG/ACT nasal spray 2 sprays by Each Nostril route daily 16 g 5    fexofenadine (ALLEGRA) 180 MG tablet Take 1 tablet by mouth daily 30 tablet 5    azelastine (ASTELIN) 0.1 % nasal spray 2 sprays by Nasal route 2 times daily Use in each nostril as directed 120 mL 1    tiotropium (SPIRIVA HANDIHALER) 18 MCG inhalation capsule Inhale 1 capsule into the lungs daily 90 capsule 1    predniSONE (DELTASONE) 10 MG tablet Take 4 tablets by mouth once daily for 5 days 20 tablet 0    albuterol sulfate HFA (PROVENTIL;VENTOLIN;PROAIR) 108 (90 Base) MCG/ACT inhaler INHALE 1 TO 2 PUFFS BY MOUTH EVERY 4 HOURS AS NEEDED FOR WHEEZING 8.5 g 5    amphetamine-dextroamphetamine (ADDERALL XR) 20 MG extended release capsule Take 1 capsule by mouth daily for 90 days.  80 capsule 0    Calcium Carbonate+Vitamin D 600-200 MG-UNIT TABS Take 1 tablet by mouth daily 90 tablet 1    ibuprofen (ADVIL;MOTRIN) 800 MG tablet Take 1 tablet by mouth every 8 hours as needed for Pain 180 tablet 1    budesonide-formoterol (SYMBICORT) 160-4.5 MCG/ACT AERO INHALE 2 PUFFS INTO THE LUNGS TWICE DAILY 30.6 g 3    Multiple Vitamins-Minerals (CENTRUM SILVER PO) Take by mouth      aspirin 325 MG tablet Take 325 mg by mouth        No current facility-administered medications for this visit. Allergies   Allergen Reactions    Pneumovax [Pneumococcal Polysaccharide Vaccine] Swelling     Localized swelling and facial swelling    Prevnar 13  [Pneumococcal 13-Andria Conj Vacc] Swelling       Health Maintenance   Topic Date Due    HIV screen  Never done    Hepatitis C screen  Never done    COVID-19 Vaccine (3 - Booster for Pfizer series) 02/09/2022    Flu vaccine (Season Ended) 01/18/2023 (Originally 9/1/2022)    Depression Screen  01/18/2023    A1C test (Diabetic or Prediabetic)  04/26/2023    DTaP/Tdap/Td vaccine (2 - Td or Tdap) 05/28/2024    Colorectal Cancer Screen  09/28/2024    Lipids  01/21/2027    Hepatitis A vaccine  Aged Out    Hepatitis B vaccine  Aged Out    Hib vaccine  Aged Out    Meningococcal (ACWY) vaccine  Aged Out       :     Review of Systems   Constitutional: Negative for chills, fatigue and fever. HENT: Positive for sore throat. Negative for ear discharge, ear pain, sinus pressure, sinus pain and trouble swallowing. Eyes: Negative for discharge, redness and itching. Respiratory: Negative for cough, chest tightness, shortness of breath and wheezing. Cardiovascular: Positive for chest pain (left ). Gastrointestinal: Negative for abdominal pain, diarrhea, nausea and vomiting. Genitourinary: Negative for difficulty urinating. Musculoskeletal: Positive for back pain (with sciatic ). Negative for arthralgias and neck pain.         Left calf pain    Skin: Negative for rash. Neurological: Negative for dizziness, weakness, light-headedness and headaches. All other systems reviewed and are negative. Objective:     Physical Exam  Constitutional:       Appearance: Normal appearance. She is obese. HENT:      Head: Normocephalic and atraumatic. Nose: Nose normal.   Eyes:      Extraocular Movements: Extraocular movements intact. Conjunctiva/sclera: Conjunctivae normal.      Pupils: Pupils are equal, round, and reactive to light. Cardiovascular:      Rate and Rhythm: Normal rate and regular rhythm. Pulses: Normal pulses. Heart sounds: Normal heart sounds. Pulmonary:      Effort: Pulmonary effort is normal.      Breath sounds: Examination of the right-lower field reveals wheezing. Wheezing present. Abdominal:      General: Abdomen is flat. Palpations: Abdomen is soft. Musculoskeletal:         General: Normal range of motion. Cervical back: Neck supple. Skin:     General: Skin is warm and dry. Capillary Refill: Capillary refill takes less than 2 seconds. Neurological:      General: No focal deficit present. Mental Status: She is alert and oriented to person, place, and time. Psychiatric:         Mood and Affect: Mood normal.       /86   Pulse 82   Resp 16   Wt 200 lb 9.6 oz (91 kg)   LMP 06/15/2015 (Exact Date)   SpO2 97%   Breastfeeding No   BMI 33.38 kg/m²     Assessment:       Diagnosis Orders   1. Pain of left calf  US DUP LOWER EXTREMITY LEFT MARIE   2. Neck pain  VL SUBCLAVIAN/ JUGULAR VENOUS SCAN   3. Hx of blood clots  VL SUBCLAVIAN/ JUGULAR VENOUS SCAN   4. Acute left-sided low back pain with left-sided sciatica     5. Left-sided chest pain     6. Allergic rhinitis, unspecified seasonality, unspecified trigger     7. Personal history of tobacco use, presenting hazards to health  [place] TOBACCO USE CESSATION INTERMEDIATE 3-10 MINUTES [47873]       : No follow-ups on file.   1. Pain in calf  -rx for us of LLE  2 neck pain  -rx for US  3. Hx of blood clots  -order for us of neck and leg d/t increased pain  4. Back pain  -better with exercise. I did encourage that she get the mri done if the pain gets worse  5. Chest pain  -hx of rib fractures  Discussed smoking cessation. She is trying to cut back  6. Allergic rhinitis  -rx for prednisone 40mg daily x 5 days  -rx for allegra 180mg daily  -rx for flonase  -rx astelin spray  7.wheezing  -spiriva for breathing. She was on this before.   -rx for prednisone  -discussed smoking cessation  8. Tobacco abuse  -discussed smoking cessation. Trying to cut back and try vaping to quit. F/u as scheduled on 22. Orders Placed This Encounter   Procedures    US DUP LOWER EXTREMITY LEFT MARIE     Standing Status:   Future     Standing Expiration Date:   2023     Order Specific Question:   Reason for exam:     Answer:   hx of blood clots. c/o new onset calf pain    VL SUBCLAVIAN/ JUGULAR VENOUS SCAN     Standing Status:   Future     Standing Expiration Date:   2023     Order Specific Question:   Reason for exam:     Answer:   neck pain. hx of blood clots.  left sided neck pain    [place] TOBACCO USE CESSATION INTERMEDIATE 3-10 MINUTES [94817]     Orders Placed This Encounter   Medications    fluticasone (FLONASE) 50 MCG/ACT nasal spray     Si sprays by Each Nostril route daily     Dispense:  16 g     Refill:  5    fexofenadine (ALLEGRA) 180 MG tablet     Sig: Take 1 tablet by mouth daily     Dispense:  30 tablet     Refill:  5    azelastine (ASTELIN) 0.1 % nasal spray     Si sprays by Nasal route 2 times daily Use in each nostril as directed     Dispense:  120 mL     Refill:  1    tiotropium (SPIRIVA HANDIHALER) 18 MCG inhalation capsule     Sig: Inhale 1 capsule into the lungs daily     Dispense:  90 capsule     Refill:  1    predniSONE (DELTASONE) 10 MG tablet     Sig: Take 4 tablets by mouth once daily for 5 days     Dispense:  20 tablet     Refill:  0 Patient given educational materials - seepatient instructions. Discussed use, benefit, and side effects of prescribed medications. All patient questions answered. Pt voiced understanding. Reviewed health maintenance. Instructed to continue current medications, diet and exercise. Patient agreedwith treatment plan. Follow up as directed. Electronically signed by KIN Alvarado CNP on 6/9/2022at 2:50 PM            Tobacco Cessation Counseling: Patient advised about behavior change, including information about personal health harms, usage of appropriate cessation measures and benefits of cessation.   Time spent (minutes): 3-10 min

## 2022-06-25 ENCOUNTER — HOSPITAL ENCOUNTER (OUTPATIENT)
Dept: MRI IMAGING | Age: 47
Discharge: HOME | End: 2022-06-27
Payer: COMMERCIAL

## 2022-06-25 DIAGNOSIS — R32 URINARY INCONTINENCE, UNSPECIFIED TYPE: ICD-10-CM

## 2022-06-25 DIAGNOSIS — M54.42 ACUTE LEFT-SIDED LOW BACK PAIN WITH LEFT-SIDED SCIATICA: ICD-10-CM

## 2022-06-25 PROCEDURE — 72158 MRI LUMBAR SPINE W/O & W/DYE: CPT

## 2022-06-25 PROCEDURE — A9579 GAD-BASE MR CONTRAST NOS,1ML: HCPCS | Performed by: NURSE PRACTITIONER

## 2022-06-25 PROCEDURE — 6360000004 HC RX CONTRAST MEDICATION: Performed by: NURSE PRACTITIONER

## 2022-06-25 RX ADMIN — GADOTERIDOL 18 ML: 279.3 INJECTION, SOLUTION INTRAVENOUS at 13:36

## 2022-06-27 DIAGNOSIS — M51.36 BULGING LUMBAR DISC: Primary | ICD-10-CM

## 2022-07-08 ENCOUNTER — HOSPITAL ENCOUNTER (OUTPATIENT)
Dept: VASCULAR LAB | Age: 47
Discharge: HOME | End: 2022-07-08
Payer: COMMERCIAL

## 2022-07-08 DIAGNOSIS — M79.662 PAIN OF LEFT CALF: ICD-10-CM

## 2022-07-08 PROCEDURE — 93971 EXTREMITY STUDY: CPT

## 2022-07-22 DIAGNOSIS — F90.2 ATTENTION DEFICIT HYPERACTIVITY DISORDER (ADHD), COMBINED TYPE: ICD-10-CM

## 2022-07-22 DIAGNOSIS — J41.8 MIXED SIMPLE AND MUCOPURULENT CHRONIC BRONCHITIS (HCC): ICD-10-CM

## 2022-07-22 RX ORDER — DEXTROAMPHETAMINE SACCHARATE, AMPHETAMINE ASPARTATE MONOHYDRATE, DEXTROAMPHETAMINE SULFATE AND AMPHETAMINE SULFATE 5; 5; 5; 5 MG/1; MG/1; MG/1; MG/1
20 CAPSULE, EXTENDED RELEASE ORAL DAILY
Qty: 90 CAPSULE | Refills: 0 | Status: SHIPPED | OUTPATIENT
Start: 2022-07-22 | End: 2022-10-17 | Stop reason: SDUPTHER

## 2022-07-22 RX ORDER — BUDESONIDE AND FORMOTEROL FUMARATE DIHYDRATE 160; 4.5 UG/1; UG/1
AEROSOL RESPIRATORY (INHALATION)
Qty: 30.6 G | Refills: 3 | Status: SHIPPED | OUTPATIENT
Start: 2022-07-22 | End: 2022-08-22

## 2022-07-25 ENCOUNTER — OFFICE VISIT (OUTPATIENT)
Dept: PRIMARY CARE CLINIC | Age: 47
End: 2022-07-25
Payer: COMMERCIAL

## 2022-07-25 VITALS
WEIGHT: 202 LBS | HEART RATE: 93 BPM | OXYGEN SATURATION: 95 % | RESPIRATION RATE: 16 BRPM | DIASTOLIC BLOOD PRESSURE: 86 MMHG | BODY MASS INDEX: 33.61 KG/M2 | SYSTOLIC BLOOD PRESSURE: 128 MMHG

## 2022-07-25 DIAGNOSIS — R73.03 PREDIABETES: ICD-10-CM

## 2022-07-25 DIAGNOSIS — Z87.891 PERSONAL HISTORY OF TOBACCO USE, PRESENTING HAZARDS TO HEALTH: ICD-10-CM

## 2022-07-25 DIAGNOSIS — M47.26 OSTEOARTHRITIS OF SPINE WITH RADICULOPATHY, LUMBAR REGION: Primary | ICD-10-CM

## 2022-07-25 DIAGNOSIS — F17.200 SMOKER: ICD-10-CM

## 2022-07-25 DIAGNOSIS — F90.2 ATTENTION DEFICIT HYPERACTIVITY DISORDER (ADHD), COMBINED TYPE: ICD-10-CM

## 2022-07-25 DIAGNOSIS — F41.9 ANXIETY: ICD-10-CM

## 2022-07-25 DIAGNOSIS — M51.36 BULGING LUMBAR DISC: ICD-10-CM

## 2022-07-25 PROCEDURE — 99214 OFFICE O/P EST MOD 30 MIN: CPT | Performed by: NURSE PRACTITIONER

## 2022-07-25 PROCEDURE — 99406 BEHAV CHNG SMOKING 3-10 MIN: CPT | Performed by: NURSE PRACTITIONER

## 2022-07-25 RX ORDER — HYDROXYZINE HYDROCHLORIDE 25 MG/1
25 TABLET, FILM COATED ORAL NIGHTLY
Qty: 30 TABLET | Refills: 0 | Status: SHIPPED | OUTPATIENT
Start: 2022-07-25 | End: 2022-08-24

## 2022-07-25 ASSESSMENT — ENCOUNTER SYMPTOMS
EYE DISCHARGE: 0
BACK PAIN: 1
SINUS PAIN: 0
NAUSEA: 0
CHEST TIGHTNESS: 0
SHORTNESS OF BREATH: 0
DIARRHEA: 0
SORE THROAT: 0
WHEEZING: 0
EYE REDNESS: 0
EYE ITCHING: 0
VOMITING: 0
COUGH: 0
TROUBLE SWALLOWING: 0
ABDOMINAL PAIN: 0
SINUS PRESSURE: 0

## 2022-07-25 ASSESSMENT — PATIENT HEALTH QUESTIONNAIRE - PHQ9
SUM OF ALL RESPONSES TO PHQ QUESTIONS 1-9: 0
SUM OF ALL RESPONSES TO PHQ QUESTIONS 1-9: 0
2. FEELING DOWN, DEPRESSED OR HOPELESS: 0
SUM OF ALL RESPONSES TO PHQ9 QUESTIONS 1 & 2: 0
SUM OF ALL RESPONSES TO PHQ QUESTIONS 1-9: 0
SUM OF ALL RESPONSES TO PHQ QUESTIONS 1-9: 0
1. LITTLE INTEREST OR PLEASURE IN DOING THINGS: 0

## 2022-07-25 NOTE — PROGRESS NOTES
[facility] PRIMARY CARE  [facility] [address] [facility] [address]  [address] [address]  Dept: [phone]  Dept Fax: [phone]    Bubba Snell is a 50 y.o. female who presents today for her medical conditions/complaintsas noted below. Bubba Snell is c/o of Diabetes (Prediabetes f/u)        HPI:     Patient presents for 3-month diabetic follow-up  Blood pressure stable  Weight is stable    Patient presents for a 3-month prediabetic follow-up. She is not currently on any medication for her prediabetes. She does not check her blood sugar    She has not followed up with back surgeon yet. Needs referral to physical therapy    She has an appointment with cardiology on     She does admit that over the last month she has had increased anxiety after she gets home from work. She is a hard time falling asleep due to her mind racing. She is feeling increasingly anxious where she is biting her nails. This is new for her. Continues to have her back pain. No change. She also continues to smoke. She has tried vaping to cut back. Past Medical History:   Diagnosis Date    Acute deep vein thrombosis (DVT) of right lower extremity ([address] 2017    Acute internal jugular vein embolism ([address] 2017    Correspondence dated 2017 from [facility] reviewed on 2017, see Media tab of Chart Review for more information. Correspondence dated 2017 from Dr. Corina Ramirez reviewed on 17, see Media tab of Chart Review for more information. Correspondence dated 2017 from Dr. Corina Ramirez reviewed on 2017, see Media tab of Chart Review for more information    Asthma     Bronchitis     DVT (deep venous thrombosis) ([address]       Past Surgical History:   Procedure Laterality Date    APPENDECTOMY      HYSTERECTOMY (CERVIX STATUS UNKNOWN)      TONSILLECTOMY         History reviewed. No pertinent family history.     Social History     Tobacco Use    Smoking status: Every Day     Packs/day: 1.00     Years: 30.00     Pack years: 30.00     Types: Cigarettes    Smokeless tobacco: Never    Tobacco comments:     smokes 1 cigarette in morning, on Chantix   Substance Use Topics    Alcohol use: Yes     Alcohol/week: 2.0 standard drinks     Types: 2 Cans of beer per week      Current Outpatient Medications   Medication Sig Dispense Refill    hydrOXYzine HCl (ATARAX) 25 MG tablet Take 1 tablet by mouth nightly 30 tablet 0    budesonide-formoterol (SYMBICORT) 160-4.5 MCG/ACT AERO INHALE 2 PUFFS INTO THE LUNGS TWICE DAILY 30.6 g 3    amphetamine-dextroamphetamine (ADDERALL XR) 20 MG extended release capsule Take 1 capsule by mouth in the morning for 90 days. 90 capsule 0    fluticasone (FLONASE) 50 MCG/ACT nasal spray 2 sprays by Each Nostril route daily 16 g 5    fexofenadine (ALLEGRA) 180 MG tablet Take 1 tablet by mouth daily 30 tablet 5    azelastine (ASTELIN) 0.1 % nasal spray 2 sprays by Nasal route 2 times daily Use in each nostril as directed 120 mL 1    tiotropium (SPIRIVA HANDIHALER) 18 MCG inhalation capsule Inhale 1 capsule into the lungs daily 90 capsule 1    albuterol sulfate HFA (PROVENTIL;VENTOLIN;PROAIR) 108 (90 Base) MCG/ACT inhaler INHALE 1 TO 2 PUFFS BY MOUTH EVERY 4 HOURS AS NEEDED FOR WHEEZING 8.5 g 5    Calcium Carbonate+Vitamin D 600-200 MG-UNIT TABS Take 1 tablet by mouth daily 90 tablet 1    ibuprofen (ADVIL;MOTRIN) 800 MG tablet Take 1 tablet by mouth every 8 hours as needed for Pain 180 tablet 1    Multiple Vitamins-Minerals (CENTRUM SILVER PO) Take by mouth      aspirin 325 MG tablet Take 325 mg by mouth        No current facility-administered medications for this visit.      Allergies   Allergen Reactions    Pneumovax [Pneumococcal Polysaccharide Vaccine] Swelling     Localized swelling and facial swelling    Prevnar 13  [Pneumococcal 13-Andria Conj Vacc] Swelling       Health Maintenance   Topic Date Due    HIV screen  Never done    Hepatitis C screen Never done    COVID-19 Vaccine (3 - Booster for Pfizer series) 02/09/2022    Flu vaccine (1) 09/01/2022    A1C test (Diabetic or Prediabetic)  04/26/2023    Depression Screen  06/09/2023    DTaP/Tdap/Td vaccine (2 - Td or Tdap) 05/28/2024    Colorectal Cancer Screen  09/28/2024    Lipids  01/21/2027    Hepatitis A vaccine  Aged Out    Hepatitis B vaccine  Aged Out    Hib vaccine  Aged Out    Meningococcal (ACWY) vaccine  Aged Out       :     Review of Systems   Constitutional:  Negative for chills, fatigue and fever. HENT:  Negative for ear discharge, ear pain, sinus pressure, sinus pain, sore throat and trouble swallowing. Eyes:  Negative for discharge, redness and itching. Respiratory:  Negative for cough, chest tightness, shortness of breath and wheezing. Cardiovascular:  Negative for chest pain. Gastrointestinal:  Negative for abdominal pain, diarrhea, nausea and vomiting. Genitourinary:  Negative for difficulty urinating. Musculoskeletal:  Positive for back pain. Negative for arthralgias and neck pain. Skin:  Negative for rash. Neurological:  Negative for dizziness, weakness, light-headedness and headaches. Psychiatric/Behavioral:  The patient is nervous/anxious. All other systems reviewed and are negative. Objective:     Physical Exam  Constitutional:       Appearance: Normal appearance. She is obese. HENT:      Head: Normocephalic and atraumatic. Nose: Nose normal.   Eyes:      Extraocular Movements: Extraocular movements intact. Conjunctiva/sclera: Conjunctivae normal.      Pupils: Pupils are equal, round, and reactive to light. Cardiovascular:      Rate and Rhythm: Normal rate and regular rhythm. Pulses: Normal pulses. Heart sounds: Normal heart sounds. Pulmonary:      Effort: Pulmonary effort is normal.      Breath sounds: Normal breath sounds. Abdominal:      General: Abdomen is flat. Palpations: Abdomen is soft.    Musculoskeletal: General: Normal range of motion. Cervical back: Neck supple. Skin:     General: Skin is warm and dry. Capillary Refill: Capillary refill takes less than 2 seconds. Neurological:      General: No focal deficit present. Mental Status: She is alert and oriented to person, place, and time. Psychiatric:         Mood and Affect: Mood normal.     /86   Pulse 93   Resp 16   Wt 202 lb (91.6 kg)   LMP 06/15/2015 (Exact Date)   SpO2 95%   BMI 33.61 kg/m²     Assessment:       Diagnosis Orders   1. Osteoarthritis of spine with radiculopathy, lumbar region  External Referral To Physical Therapy      2. Attention deficit hyperactivity disorder (ADHD), combined type        3. Bulging lumbar disc        4. Smoker        5. Prediabetes        6. Anxiety            :      Return in about 1 month (around 8/25/2022) for anxiety . 1.  -2.Osteoarthritis of spine and bulging lumbar disc  Referral to physical therapy  Patient wants to hold off on seeing back surgeon  Continue with stretching at home  3. Attention deficit disorder  Adderall working well for her. Plan to continue with current dosing Adderall 20 mg extended release daily  4. Smoker  Patient is trying to quit. She knows that it is bad for her. We did discuss smoking cessation  5. Prediabetes  Patient due for A1c check. Last A1c was 5.7.  6.  Anxiety  We will try hydroxyzine 25 to 50 mg nightly.   We will see if this helps with her anxiety and help her sleep    Follow-up in 1 month for anxiety  Orders Placed This Encounter   Procedures    External Referral To Physical Therapy     Referral Priority:   Routine     Referral Type:   Eval and Treat     Referral Reason:   Specialty Services Required     Requested Specialty:   Physical Therapist     Number of Visits Requested:   1     Orders Placed This Encounter   Medications    hydrOXYzine HCl (ATARAX) 25 MG tablet     Sig: Take 1 tablet by mouth nightly     Dispense:  30 tablet     Refill:  0 Patient given educational materials - seepatient instructions. Discussed use, benefit, and side effects of prescribed medications. All patient questions answered. Pt voiced understanding. Reviewed health maintenance. Instructed to continue current medications, diet and exercise. Patient agreedwith treatment plan. Follow up as directed. Electronically signed by KIN Salcedo CNP on 7/25/2022at 12:48 PM            Tobacco Cessation Counseling: Patient advised about behavior change, including information about personal health harms, usage of appropriate cessation measures and benefits of cessation.   Time spent (minutes): 3-10 min

## 2022-08-22 DIAGNOSIS — J41.8 MIXED SIMPLE AND MUCOPURULENT CHRONIC BRONCHITIS (HCC): ICD-10-CM

## 2022-08-22 RX ORDER — BUDESONIDE AND FORMOTEROL FUMARATE DIHYDRATE 160; 4.5 UG/1; UG/1
AEROSOL RESPIRATORY (INHALATION)
Qty: 30.6 G | Refills: 3 | Status: SHIPPED | OUTPATIENT
Start: 2022-08-22

## 2022-08-25 ENCOUNTER — OFFICE VISIT (OUTPATIENT)
Dept: PRIMARY CARE CLINIC | Age: 47
End: 2022-08-25
Payer: COMMERCIAL

## 2022-08-25 VITALS
SYSTOLIC BLOOD PRESSURE: 124 MMHG | WEIGHT: 200 LBS | OXYGEN SATURATION: 92 % | BODY MASS INDEX: 33.28 KG/M2 | RESPIRATION RATE: 16 BRPM | DIASTOLIC BLOOD PRESSURE: 86 MMHG | HEART RATE: 89 BPM

## 2022-08-25 DIAGNOSIS — R21 RASH: ICD-10-CM

## 2022-08-25 DIAGNOSIS — M51.36 BULGING LUMBAR DISC: ICD-10-CM

## 2022-08-25 DIAGNOSIS — M79.662 PAIN OF LEFT CALF: ICD-10-CM

## 2022-08-25 DIAGNOSIS — F41.9 ANXIETY: Primary | ICD-10-CM

## 2022-08-25 DIAGNOSIS — I82.C29: ICD-10-CM

## 2022-08-25 PROCEDURE — 99214 OFFICE O/P EST MOD 30 MIN: CPT | Performed by: NURSE PRACTITIONER

## 2022-08-25 RX ORDER — ALPRAZOLAM 0.25 MG/1
0.25 TABLET ORAL NIGHTLY PRN
Qty: 30 TABLET | Refills: 0 | Status: SHIPPED | OUTPATIENT
Start: 2022-08-25 | End: 2022-09-24

## 2022-08-25 RX ORDER — TIOTROPIUM BROMIDE 18 UG/1
18 CAPSULE ORAL; RESPIRATORY (INHALATION) DAILY
Qty: 90 CAPSULE | Refills: 1 | Status: SHIPPED | OUTPATIENT
Start: 2022-08-25

## 2022-08-25 ASSESSMENT — ENCOUNTER SYMPTOMS
EYE DISCHARGE: 0
CHEST TIGHTNESS: 0
EYE REDNESS: 0
WHEEZING: 0
NAUSEA: 0
ABDOMINAL PAIN: 0
COUGH: 0
SINUS PRESSURE: 0
DIARRHEA: 0
SORE THROAT: 0
TROUBLE SWALLOWING: 0
SINUS PAIN: 0
VOMITING: 0
SHORTNESS OF BREATH: 0
EYE ITCHING: 0

## 2022-08-25 ASSESSMENT — PATIENT HEALTH QUESTIONNAIRE - PHQ9
SUM OF ALL RESPONSES TO PHQ9 QUESTIONS 1 & 2: 0
SUM OF ALL RESPONSES TO PHQ QUESTIONS 1-9: 0
1. LITTLE INTEREST OR PLEASURE IN DOING THINGS: 0
2. FEELING DOWN, DEPRESSED OR HOPELESS: 0

## 2022-08-25 NOTE — PROGRESS NOTES
[facility] CARE  [facility] [address]  [address] [address]  Dept: [phone]  Dept Fax: [phone]    Giovanni Dean is a 50 y.o. female who presents today for her medical conditions/complaintsas noted below. Giovanni Dean is c/o of Anxiety (1 mo f/u, hydroxyzine caused drowsiness) and Rash (Behind R leg, comes and goes x1 mo, no itching, no pain)        HPI:     Patient presents for 1 month follow-up  Blood pressure stable  Weight is down 2 pounds but stable    Patient presents for 1 month follow-up. Going to a chiro. He wants her to follow up with back surgeon, dr. Yaritza Underwood  Hip pain is better  Having calf pain which we think is r/t back    Rash to back of right calf. No pain. Does not itch. Nothing new. Has not tried anything     Saw new cardiologist. He told her to stop aspirin and her heart was fine. Took hydroxyzine for anxiety. This made her sleep for over 14 hours. She only took 1 pill. She even tried taking half of a pill but it makes her very groggy the next day. They did just get a new puppy which is helping with her anxiety some. Past Medical History:   Diagnosis Date    Acute deep vein thrombosis (DVT) of right lower extremity ([address] 2/16/2017    Acute internal jugular vein embolism ([address] 2/16/2017    Correspondence dated February 21 2017 from [facility] reviewed on 2/22/2017, see Media tab of Chart Review for more information. Correspondence dated July 19, 2017 from Dr. Cande Arroyo reviewed on 7/20/17, see Media tab of Chart Review for more information. Correspondence dated August 16, 2017 from Dr. Cande Arroyo reviewed on 8/18/2017, see Media tab of Chart Review for more information    Asthma     Bronchitis     DVT (deep venous thrombosis) ([address]       Past Surgical History:   Procedure Laterality Date    APPENDECTOMY      HYSTERECTOMY (CERVIX STATUS UNKNOWN)      TONSILLECTOMY         History reviewed.  No pertinent family history. Social History     Tobacco Use    Smoking status: Every Day     Packs/day: 1.00     Years: 30.00     Pack years: 30.00     Types: Cigarettes    Smokeless tobacco: Never    Tobacco comments:     smokes 1 cigarette in morning, on Chantix   Substance Use Topics    Alcohol use: Yes     Alcohol/week: 2.0 standard drinks     Types: 2 Cans of beer per week      Current Outpatient Medications   Medication Sig Dispense Refill    tiotropium (SPIRIVA HANDIHALER) 18 MCG inhalation capsule Inhale 1 capsule into the lungs daily 90 capsule 1    triamcinolone (KENALOG) 0.1 % ointment Apply topically 2 times daily for 7 days 30 g 0    ALPRAZolam (XANAX) 0.25 MG tablet Take 1 tablet by mouth nightly as needed for Anxiety for up to 30 days. 30 tablet 0    budesonide-formoterol (SYMBICORT) 160-4.5 MCG/ACT AERO INHALE 2 PUFFS INTO THE LUNGS TWICE DAILY 30.6 g 3    amphetamine-dextroamphetamine (ADDERALL XR) 20 MG extended release capsule Take 1 capsule by mouth in the morning for 90 days. 90 capsule 0    fluticasone (FLONASE) 50 MCG/ACT nasal spray 2 sprays by Each Nostril route daily 16 g 5    fexofenadine (ALLEGRA) 180 MG tablet Take 1 tablet by mouth daily 30 tablet 5    azelastine (ASTELIN) 0.1 % nasal spray 2 sprays by Nasal route 2 times daily Use in each nostril as directed 120 mL 1    albuterol sulfate HFA (PROVENTIL;VENTOLIN;PROAIR) 108 (90 Base) MCG/ACT inhaler INHALE 1 TO 2 PUFFS BY MOUTH EVERY 4 HOURS AS NEEDED FOR WHEEZING 8.5 g 5    Calcium Carbonate+Vitamin D 600-200 MG-UNIT TABS Take 1 tablet by mouth daily 90 tablet 1    ibuprofen (ADVIL;MOTRIN) 800 MG tablet Take 1 tablet by mouth every 8 hours as needed for Pain 180 tablet 1    Multiple Vitamins-Minerals (CENTRUM SILVER PO) Take by mouth      aspirin 325 MG tablet Take 325 mg by mouth        No current facility-administered medications for this visit.      Allergies   Allergen Reactions    Pneumovax [Pneumococcal Polysaccharide Vaccine] Swelling Localized swelling and facial swelling    Prevnar 13  [Pneumococcal 13-Andria Conj Vacc] Swelling       Health Maintenance   Topic Date Due    HIV screen  Never done    Hepatitis C screen  Never done    COVID-19 Vaccine (3 - Booster for Pfizer series) 02/09/2022    Flu vaccine (1) 09/01/2022    A1C test (Diabetic or Prediabetic)  04/26/2023    Depression Screen  07/25/2023    DTaP/Tdap/Td vaccine (2 - Td or Tdap) 05/28/2024    Colorectal Cancer Screen  09/28/2024    Lipids  01/21/2027    Hepatitis A vaccine  Aged Out    Hepatitis B vaccine  Aged Out    Hib vaccine  Aged Out    Meningococcal (ACWY) vaccine  Aged Out       :     Review of Systems   Constitutional:  Negative for chills, fatigue and fever. HENT:  Negative for ear discharge, ear pain, sinus pressure, sinus pain, sore throat and trouble swallowing. Eyes:  Negative for discharge, redness and itching. Respiratory:  Negative for cough, chest tightness, shortness of breath and wheezing. Cardiovascular:  Negative for chest pain. Gastrointestinal:  Negative for abdominal pain, diarrhea, nausea and vomiting. Genitourinary:  Negative for difficulty urinating. Musculoskeletal:  Negative for arthralgias and neck pain. Skin:  Negative for rash. Neurological:  Negative for dizziness, weakness, light-headedness and headaches. Psychiatric/Behavioral:  The patient is nervous/anxious. All other systems reviewed and are negative. Objective:     Physical Exam  Constitutional:       Appearance: Normal appearance. She is normal weight. HENT:      Head: Normocephalic and atraumatic. Nose: Nose normal.   Eyes:      Extraocular Movements: Extraocular movements intact. Conjunctiva/sclera: Conjunctivae normal.      Pupils: Pupils are equal, round, and reactive to light. Cardiovascular:      Rate and Rhythm: Normal rate and regular rhythm. Pulses: Normal pulses. Heart sounds: Normal heart sounds.    Pulmonary:      Effort: Pulmonary effort is normal.      Breath sounds: Normal breath sounds. Abdominal:      General: Abdomen is flat. Palpations: Abdomen is soft. Musculoskeletal:         General: Normal range of motion. Cervical back: Neck supple. Skin:     General: Skin is warm and dry. Capillary Refill: Capillary refill takes less than 2 seconds. Neurological:      General: No focal deficit present. Mental Status: She is alert and oriented to person, place, and time. Psychiatric:         Mood and Affect: Mood normal.     /86   Pulse 89   Resp 16   Wt 200 lb (90.7 kg)   LMP 06/15/2015 (Exact Date)   SpO2 92%   BMI 33.28 kg/m²     Assessment:       Diagnosis Orders   1. Anxiety  ALPRAZolam (XANAX) 0.25 MG tablet      2. Bulging lumbar disc        3. Pain of left calf        4. Rash        5. 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  Anxiety  Stop hydroxyzine  Patient does not want Rx for Xanax 0.25 mg p.o. nightly. She is to take half a tablet  2-3. Bulging lumbar disc, pain in left calf  Patient is following with a chiropractor. She has not started physical therapy yet. She plans on following up with her back surgeon. 4.  Rash  Rx for Kenalog cream  5. Chronic embolism  Patient is going to continue with daily aspirin    Patient go to follow-up in October for her physical.  She may return sooner as needed  Orders Placed This Encounter   Medications    tiotropium (Pao Param) 18 MCG inhalation capsule     Sig: Inhale 1 capsule into the lungs daily     Dispense:  90 capsule     Refill:  1    triamcinolone (KENALOG) 0.1 % ointment     Sig: Apply topically 2 times daily for 7 days     Dispense:  30 g     Refill:  0    ALPRAZolam (XANAX) 0.25 MG tablet     Sig: Take 1 tablet by mouth nightly as needed for Anxiety for up to 30 days.      Dispense:  30 tablet     Refill: 0       Patient given educational materials - seepatient instructions. Discussed use, benefit, and side effects of prescribed medications. All patient questions answered. Pt voiced understanding. Reviewed health maintenance. Instructed to continue current medications, diet and exercise. Patient agreedwith treatment plan. Follow up as directed.       Electronically signed by Gerhardt Plowman, APRN - CNP on 8/25/2022at 1:41 PM

## 2022-09-14 ENCOUNTER — TELEPHONE (OUTPATIENT)
Dept: PRIMARY CARE CLINIC | Age: 47
End: 2022-09-14

## 2022-09-21 ENCOUNTER — OFFICE VISIT (OUTPATIENT)
Dept: PRIMARY CARE CLINIC | Age: 47
End: 2022-09-21
Payer: COMMERCIAL

## 2022-09-21 VITALS
DIASTOLIC BLOOD PRESSURE: 88 MMHG | BODY MASS INDEX: 33.41 KG/M2 | SYSTOLIC BLOOD PRESSURE: 136 MMHG | TEMPERATURE: 98.1 F | WEIGHT: 200.8 LBS | HEART RATE: 91 BPM | RESPIRATION RATE: 16 BRPM | OXYGEN SATURATION: 97 %

## 2022-09-21 DIAGNOSIS — R73.03 PREDIABETES: ICD-10-CM

## 2022-09-21 DIAGNOSIS — R10.13 DYSPEPSIA: Primary | ICD-10-CM

## 2022-09-21 LAB — HBA1C MFR BLD: 5.5 %

## 2022-09-21 PROCEDURE — 83036 HEMOGLOBIN GLYCOSYLATED A1C: CPT | Performed by: NURSE PRACTITIONER

## 2022-09-21 PROCEDURE — 99214 OFFICE O/P EST MOD 30 MIN: CPT | Performed by: NURSE PRACTITIONER

## 2022-09-21 RX ORDER — OMEPRAZOLE 40 MG/1
40 CAPSULE, DELAYED RELEASE ORAL
Qty: 30 CAPSULE | Refills: 0 | Status: SHIPPED | OUTPATIENT
Start: 2022-09-21 | End: 2022-10-18

## 2022-09-21 SDOH — ECONOMIC STABILITY: FOOD INSECURITY: WITHIN THE PAST 12 MONTHS, THE FOOD YOU BOUGHT JUST DIDN'T LAST AND YOU DIDN'T HAVE MONEY TO GET MORE.: NEVER TRUE

## 2022-09-21 SDOH — ECONOMIC STABILITY: FOOD INSECURITY: WITHIN THE PAST 12 MONTHS, YOU WORRIED THAT YOUR FOOD WOULD RUN OUT BEFORE YOU GOT MONEY TO BUY MORE.: NEVER TRUE

## 2022-09-21 ASSESSMENT — ENCOUNTER SYMPTOMS
SORE THROAT: 0
ABDOMINAL PAIN: 1
COUGH: 0
WHEEZING: 0
SHORTNESS OF BREATH: 0
SINUS PAIN: 0
EYE ITCHING: 0
VOMITING: 0
EYE DISCHARGE: 0
CHEST TIGHTNESS: 0
EYE REDNESS: 0
SINUS PRESSURE: 0
TROUBLE SWALLOWING: 0
DIARRHEA: 0
NAUSEA: 0

## 2022-09-21 ASSESSMENT — SOCIAL DETERMINANTS OF HEALTH (SDOH): HOW HARD IS IT FOR YOU TO PAY FOR THE VERY BASICS LIKE FOOD, HOUSING, MEDICAL CARE, AND HEATING?: NOT HARD AT ALL

## 2022-09-21 NOTE — PROGRESS NOTES
[address] [facility] CARE  [address] [facility] [address]  [address] [address]  Dept: [phone]  Dept Fax: [phone]    July Major is a 50 y.o. female who presents today for her medical conditions/complaintsas noted below. July Major is c/o of Abdominal Pain (Burning after eating that started Wednesday), Constipation (Started during the weekend), Bloated, and Diarrhea        HPI:     Pt presents for an office visit  Bp stable  Weight is stable    Pt presents for an office visit with c/o abdominal pain, constipation, bloating and diarrhea. 2 weeks ago, started on a Wednesday she thinks she was eating a sandwhich. She had a burning. That weekend she was constipated. She tried a medication with no relief. She still had the burning with eating and felt like the food was stuck. She was finally able to poop but it was hard. Every time she eats it feels like the food is just sitting in her stomach. Doesn't matter what it is. Then an hour later she is pooping. Liquid/soft poop. She also feels very thirsty all the time. All she drinks is water and she drinks a lot of water t/o the day. Concern for possible diabetes. Past Medical History:   Diagnosis Date    Acute deep vein thrombosis (DVT) of right lower extremity ([address] 2017    Acute internal jugular vein embolism ([facility] [address] 2017    Correspondence dated 2017 from [facility] [employer] reviewed on 2017, see Media tab of Chart Review for more information. Correspondence dated 2017 from Dr. Kemal Orosco reviewed on 17, see Media tab of Chart Review for more information.  Correspondence dated 2017 from Dr. Kemal Orosco reviewed on 2017, see Media tab of Chart Review for more information    Asthma     Bronchitis     DVT (deep venous thrombosis) [facility])       Past Surgical History:   Procedure Laterality Date    APPENDECTOMY      HYSTERECTOMY (CERVIX STATUS UNKNOWN)      TONSILLECTOMY Reactions    Pneumovax [Pneumococcal Polysaccharide Vaccine] Swelling     Localized swelling and facial swelling    Prevnar 13  [Pneumococcal 13-Andria Conj Vacc] Swelling       Health Maintenance   Topic Date Due    HIV screen  Never done    Hepatitis C screen  Never done    COVID-19 Vaccine (3 - Booster for Pfizer series) 02/09/2022    Flu vaccine (1) 09/01/2022    Depression Screen  08/25/2023    A1C test (Diabetic or Prediabetic)  09/21/2023    DTaP/Tdap/Td vaccine (2 - Td or Tdap) 05/28/2024    Colorectal Cancer Screen  09/28/2024    Lipids  01/21/2027    Hepatitis A vaccine  Aged Out    Hepatitis B vaccine  Aged Out    Hib vaccine  Aged Out    Meningococcal (ACWY) vaccine  Aged Out       :     Review of Systems   Constitutional:  Negative for chills, fatigue and fever. HENT:  Negative for ear discharge, ear pain, sinus pressure, sinus pain, sore throat and trouble swallowing. Eyes:  Negative for discharge, redness and itching. Respiratory:  Negative for cough, chest tightness, shortness of breath and wheezing. Cardiovascular:  Negative for chest pain. Gastrointestinal:  Positive for abdominal pain. Negative for diarrhea, nausea and vomiting. Endocrine: Positive for polydipsia. Genitourinary:  Negative for difficulty urinating. Musculoskeletal:  Negative for arthralgias and neck pain. Skin:  Negative for rash. Neurological:  Negative for dizziness, weakness, light-headedness and headaches. All other systems reviewed and are negative. Objective:     Physical Exam  Constitutional:       Appearance: Normal appearance. She is normal weight. HENT:      Head: Normocephalic and atraumatic. Nose: Nose normal.   Eyes:      Extraocular Movements: Extraocular movements intact. Conjunctiva/sclera: Conjunctivae normal.      Pupils: Pupils are equal, round, and reactive to light. Cardiovascular:      Rate and Rhythm: Normal rate and regular rhythm. Pulses: Normal pulses.       Heart sounds: Normal heart sounds. Pulmonary:      Effort: Pulmonary effort is normal.      Breath sounds: Normal breath sounds. Abdominal:      General: Abdomen is flat. Palpations: Abdomen is soft. Musculoskeletal:         General: Normal range of motion. Cervical back: Neck supple. Skin:     General: Skin is warm and dry. Capillary Refill: Capillary refill takes less than 2 seconds. Neurological:      General: No focal deficit present. Mental Status: She is alert and oriented to person, place, and time. Psychiatric:         Mood and Affect: Mood normal.     /88   Pulse 91   Temp 98.1 °F (36.7 °C) (Oral)   Resp 16   Wt 200 lb 12.8 oz (91.1 kg)   LMP 06/15/2015 (Exact Date)   SpO2 97%   BMI 33.41 kg/m²     Assessment:       Diagnosis Orders   1. Dyspepsia        2. Prediabetes  POCT glycosylated hemoglobin (Hb A1C)          :      Return if symptoms worsen or fail to improve. Dyspepsia  Rx for omeprazole 40mg daily  Will see if diarrhea improves  2. Prediabetes  Poc a1c improved to 5.5    F/u in one month will see if thrist and GI issues improve  Orders Placed This Encounter   Procedures    POCT glycosylated hemoglobin (Hb A1C)     Orders Placed This Encounter   Medications    omeprazole (PRILOSEC) 40 MG delayed release capsule     Sig: Take 1 capsule by mouth every morning (before breakfast)     Dispense:  30 capsule     Refill:  0       Patient given educational materials - seepatient instructions. Discussed use, benefit, and side effects of prescribed medications. All patient questions answered. Pt voiced understanding. Reviewed health maintenance. Instructed to continue current medications, diet and exercise. Patient agreedwith treatment plan. Follow up as directed.       Electronically signed by KIN Ambriz CNP on 9/21/2022at 8:35 PM

## 2022-10-17 ENCOUNTER — OFFICE VISIT (OUTPATIENT)
Dept: PRIMARY CARE CLINIC | Age: 47
End: 2022-10-17
Payer: COMMERCIAL

## 2022-10-17 VITALS
BODY MASS INDEX: 32.69 KG/M2 | SYSTOLIC BLOOD PRESSURE: 130 MMHG | HEIGHT: 66 IN | WEIGHT: 203.4 LBS | OXYGEN SATURATION: 96 % | RESPIRATION RATE: 16 BRPM | HEART RATE: 85 BPM | DIASTOLIC BLOOD PRESSURE: 82 MMHG

## 2022-10-17 DIAGNOSIS — J06.9 UPPER RESPIRATORY TRACT INFECTION, UNSPECIFIED TYPE: ICD-10-CM

## 2022-10-17 DIAGNOSIS — Z00.00 ENCOUNTER FOR WELL ADULT EXAM WITHOUT ABNORMAL FINDINGS: Primary | ICD-10-CM

## 2022-10-17 DIAGNOSIS — M51.36 BULGING LUMBAR DISC: ICD-10-CM

## 2022-10-17 DIAGNOSIS — F90.2 ATTENTION DEFICIT HYPERACTIVITY DISORDER (ADHD), COMBINED TYPE: ICD-10-CM

## 2022-10-17 PROCEDURE — 99396 PREV VISIT EST AGE 40-64: CPT | Performed by: NURSE PRACTITIONER

## 2022-10-17 RX ORDER — DEXTROAMPHETAMINE SACCHARATE, AMPHETAMINE ASPARTATE, DEXTROAMPHETAMINE SULFATE AND AMPHETAMINE SULFATE 5; 5; 5; 5 MG/1; MG/1; MG/1; MG/1
20 TABLET ORAL DAILY
Qty: 30 TABLET | Refills: 0 | Status: SHIPPED | OUTPATIENT
Start: 2022-10-17 | End: 2022-11-16

## 2022-10-17 RX ORDER — AZITHROMYCIN 250 MG/1
TABLET, FILM COATED ORAL
Qty: 1 PACKET | Refills: 0 | Status: SHIPPED | OUTPATIENT
Start: 2022-10-17 | End: 2022-10-27

## 2022-10-17 RX ORDER — DEXTROAMPHETAMINE SACCHARATE, AMPHETAMINE ASPARTATE MONOHYDRATE, DEXTROAMPHETAMINE SULFATE AND AMPHETAMINE SULFATE 5; 5; 5; 5 MG/1; MG/1; MG/1; MG/1
20 CAPSULE, EXTENDED RELEASE ORAL DAILY
Qty: 90 CAPSULE | Refills: 0 | Status: SHIPPED | OUTPATIENT
Start: 2022-10-17 | End: 2022-11-01 | Stop reason: SDUPTHER

## 2022-10-17 ASSESSMENT — ENCOUNTER SYMPTOMS
SINUS PRESSURE: 0
DIARRHEA: 0
NAUSEA: 0
VOMITING: 0
CHEST TIGHTNESS: 0
TROUBLE SWALLOWING: 0
SINUS PAIN: 0
ABDOMINAL PAIN: 0
SORE THROAT: 0
WHEEZING: 0
EYE ITCHING: 0
EYE DISCHARGE: 0
SHORTNESS OF BREATH: 0
EYE REDNESS: 0
COUGH: 0

## 2022-10-17 ASSESSMENT — PATIENT HEALTH QUESTIONNAIRE - PHQ9
2. FEELING DOWN, DEPRESSED OR HOPELESS: 0
1. LITTLE INTEREST OR PLEASURE IN DOING THINGS: 0
SUM OF ALL RESPONSES TO PHQ QUESTIONS 1-9: 0
SUM OF ALL RESPONSES TO PHQ QUESTIONS 1-9: 0
SUM OF ALL RESPONSES TO PHQ9 QUESTIONS 1 & 2: 0
SUM OF ALL RESPONSES TO PHQ QUESTIONS 1-9: 0
SUM OF ALL RESPONSES TO PHQ QUESTIONS 1-9: 0

## 2022-10-17 NOTE — PROGRESS NOTES
Well Adult Note  Name: Asmita Suazo Date: 10/17/2022   MRN: [id number] Sex: Female   Age: 50 y.o. Ethnicity: Non- / Non    : 1975 Race: White (non-)      Tyra Boyle is here for well adult exam.  History:    Patient presents for her annual physical  Blood pressure stable  Weight is stable    Patient states for her annual physical.  She already had lab work done in January. She does feel that her Adderall could be increased. She feels that she often like it is not as effective. She is still going to physical therapy for her back. She is also following with a chiropractor. It seems to be helping. She typically always gets sick around this time of year. She would like to have a Z-Burt on hand as this always works well for her bronchitis/sinusitis      Review of Systems   Constitutional:  Negative for chills, fatigue and fever. HENT:  Negative for ear discharge, ear pain, sinus pressure, sinus pain, sore throat and trouble swallowing. Eyes:  Negative for discharge, redness and itching. Respiratory:  Negative for cough, chest tightness, shortness of breath and wheezing. Cardiovascular:  Negative for chest pain. Gastrointestinal:  Negative for abdominal pain, diarrhea, nausea and vomiting. Genitourinary:  Negative for difficulty urinating. Musculoskeletal:  Negative for arthralgias and neck pain. Skin:  Negative for rash. Neurological:  Negative for dizziness, weakness, light-headedness and headaches. All other systems reviewed and are negative. Allergies   Allergen Reactions    Pneumovax [Pneumococcal Polysaccharide Vaccine] Swelling     Localized swelling and facial swelling    Prevnar 13  [Pneumococcal 13-Andria Conj Vacc] Swelling         Prior to Visit Medications    Medication Sig Taking? Authorizing Provider   amphetamine-dextroamphetamine (ADDERALL, 20MG,) 20 MG tablet Take 1 tablet by mouth daily for 30 days.  Yes Dane Yisel, APRN - CNP   amphetamine-dextroamphetamine (ADDERALL XR) 20 MG extended release capsule Take 1 capsule by mouth daily for 90 days. Yes KIN Vieira CNP   azithromycin (ZITHROMAX) 250 MG tablet 2 tablets now then 1 daily until gone. Yes KIN Vieira CNP   omeprazole (PRILOSEC) 40 MG delayed release capsule Take 1 capsule by mouth every morning (before breakfast) Yes KIN Vieira CNP   tiotropium (SPIRIVA HANDIHALER) 18 MCG inhalation capsule Inhale 1 capsule into the lungs daily Yes KIN Vieira CNP   budesonide-formoterol (SYMBICORT) 160-4.5 MCG/ACT AERO INHALE 2 PUFFS INTO THE LUNGS TWICE DAILY Yes KIN Vieira CNP   fexofenadine (ALLEGRA) 180 MG tablet Take 1 tablet by mouth daily Yes KIN Vieira CNP   azelastine (ASTELIN) 0.1 % nasal spray 2 sprays by Nasal route 2 times daily Use in each nostril as directed Yes KIN Vieira CNP   albuterol sulfate HFA (PROVENTIL;VENTOLIN;PROAIR) 108 (90 Base) MCG/ACT inhaler INHALE 1 TO 2 PUFFS BY MOUTH EVERY 4 HOURS AS NEEDED FOR WHEEZING Yes KIN Vieira CNP   Calcium Carbonate+Vitamin D 600-200 MG-UNIT TABS Take 1 tablet by mouth daily Yes KIN Vieira CNP   ibuprofen (ADVIL;MOTRIN) 800 MG tablet Take 1 tablet by mouth every 8 hours as needed for Pain Yes KIN Vieira CNP   Multiple Vitamins-Minerals (CENTRUM SILVER PO) Take by mouth Yes Historical Provider, MD   aspirin 325 MG tablet Take 325 mg by mouth  Yes Historical Provider, MD         Past Medical History:   Diagnosis Date    Acute deep vein thrombosis (DVT) of right lower extremity ([address] 2/16/2017    Acute internal jugular vein embolism ([facility] [address] 2/16/2017    Correspondence dated February 21 2017 from [employer] reviewed on 2/22/2017, see Media tab of Chart Review for more information. Correspondence dated July 19, 2017 from Dr. Maximiliano Ho reviewed on 7/20/17, see Media tab of Chart Review for more information. Correspondence dated August 16, 2017 from Dr. Oral Charles reviewed on 8/18/2017, see Media tab of Chart Review for more information    Asthma     Bronchitis     DVT (deep venous thrombosis) ([address]        Past Surgical History:   Procedure Laterality Date    APPENDECTOMY      HYSTERECTOMY (CERVIX STATUS UNKNOWN)      TONSILLECTOMY         History reviewed. No pertinent family history. Social History     Tobacco Use    Smoking status: Every Day     Packs/day: 1.00     Years: 30.00     Pack years: 30.00     Types: Cigarettes    Smokeless tobacco: Never    Tobacco comments:     smokes 1 cigarette in morning, on Chantix   Substance Use Topics    Alcohol use: Yes     Alcohol/week: 2.0 standard drinks     Types: 2 Cans of beer per week    Drug use: No       Objective   /82   Pulse 85   Resp 16   Ht 5' 6\" (1.676 m)   Wt 203 lb 6.4 oz (92.3 kg)   LMP 06/15/2015 (Exact Date)   SpO2 96%   BMI 32.83 kg/m²   Wt Readings from Last 3 Encounters:   10/17/22 203 lb 6.4 oz (92.3 kg)   09/21/22 200 lb 12.8 oz (91.1 kg)   08/25/22 200 lb (90.7 kg)     Additional Measurements    10/17/22 1112   Waist (Inches): 46 in         Physical Exam  Constitutional:       Appearance: Normal appearance. She is normal weight. HENT:      Head: Normocephalic and atraumatic. Nose: Nose normal.   Eyes:      Extraocular Movements: Extraocular movements intact. Conjunctiva/sclera: Conjunctivae normal.      Pupils: Pupils are equal, round, and reactive to light. Cardiovascular:      Rate and Rhythm: Normal rate and regular rhythm. Pulses: Normal pulses. Heart sounds: Normal heart sounds. Pulmonary:      Effort: Pulmonary effort is normal.      Breath sounds: Normal breath sounds. Abdominal:      General: Abdomen is flat. Palpations: Abdomen is soft. Musculoskeletal:         General: Normal range of motion. Cervical back: Neck supple. Skin:     General: Skin is warm and dry.       Capillary Refill: Capillary refill takes less than 2 seconds. Neurological:      General: No focal deficit present. Mental Status: She is alert and oriented to person, place, and time. Psychiatric:         Mood and Affect: Mood normal.         Assessment   Plan   1. Encounter for well adult exam without abnormal findings  Form filled out. Ok to use labs from January 2. Attention deficit hyperactivity disorder (ADHD), combined type  -     amphetamine-dextroamphetamine (ADDERALL, 20MG,) 20 MG tablet; Take 1 tablet by mouth daily for 30 days. , Disp-30 tablet, R-0Normal  -     amphetamine-dextroamphetamine (ADDERALL XR) 20 MG extended release capsule; Take 1 capsule by mouth daily for 90 days. , Disp-90 capsule, R-0Normal  Will add on 20 mg IR for the afternoon. She is going to start by breaking it in 1/2.   3. Upper respiratory tract infection, unspecified type  -     azithromycin (ZITHROMAX) 250 MG tablet; 2 tablets now then 1 daily until gone., Disp-1 packet, R-0Normal  Stop flonase. This is not helpful for her. Ok to c/w azelastine spray   4. Bulging lumbar disc   Following with PT and chiro    F/u in 3 months or sooner as needed. She is agreeable.      Personalized Preventive Plan   Current Health Maintenance Status  Immunization History   Administered Date(s) Administered    COVID-19, PFIZER PURPLE top, DILUTE for use, (age 15 y+), 30mcg/0.3mL 08/14/2021, 09/09/2021    Influenza Virus Vaccine 10/16/2018, 10/20/2019    Pneumococcal Polysaccharide ([id number]) 12/16/2016    Td (Adult), 5 Lf Tetanus Toxoid, Pf (Tenivac, Decavac) 04/15/2004    Tdap (Boostrix, Adacel) 05/28/2014        Health Maintenance   Topic Date Due    HIV screen  Never done    Hepatitis C screen  Never done    COVID-19 Vaccine (3 - Booster for Pfizer series) 02/09/2022    Flu vaccine (1) 08/01/2022    Depression Screen  08/25/2023    DTaP/Tdap/Td vaccine (2 - Td or Tdap) 05/28/2024    Colorectal Cancer Screen  09/28/2024    Lipids  01/21/2027    Hepatitis A vaccine  Aged Out    Hib vaccine  Aged Out    Meningococcal (ACWY) vaccine  Aged Out     Recommendations for [employer] Due: see orders and patient instructions/AVS.    Return in about 14 weeks (around 1/23/2023) for ADHD .

## 2022-10-18 DIAGNOSIS — F90.2 ATTENTION DEFICIT HYPERACTIVITY DISORDER (ADHD), COMBINED TYPE: ICD-10-CM

## 2022-10-18 RX ORDER — DEXTROAMPHETAMINE SACCHARATE, AMPHETAMINE ASPARTATE MONOHYDRATE, DEXTROAMPHETAMINE SULFATE AND AMPHETAMINE SULFATE 5; 5; 5; 5 MG/1; MG/1; MG/1; MG/1
20 CAPSULE, EXTENDED RELEASE ORAL DAILY
Qty: 90 CAPSULE | Refills: 0 | OUTPATIENT
Start: 2022-10-18 | End: 2023-01-16

## 2022-10-18 RX ORDER — OMEPRAZOLE 40 MG/1
CAPSULE, DELAYED RELEASE ORAL
Qty: 90 CAPSULE | Refills: 1 | Status: SHIPPED | OUTPATIENT
Start: 2022-10-18

## 2022-11-01 DIAGNOSIS — F90.2 ATTENTION DEFICIT HYPERACTIVITY DISORDER (ADHD), COMBINED TYPE: ICD-10-CM

## 2022-11-01 RX ORDER — DEXTROAMPHETAMINE SACCHARATE, AMPHETAMINE ASPARTATE MONOHYDRATE, DEXTROAMPHETAMINE SULFATE AND AMPHETAMINE SULFATE 5; 5; 5; 5 MG/1; MG/1; MG/1; MG/1
20 CAPSULE, EXTENDED RELEASE ORAL DAILY
Qty: 90 CAPSULE | Refills: 0 | Status: SHIPPED | OUTPATIENT
Start: 2022-11-01 | End: 2023-01-30

## 2022-11-28 RX ORDER — FLUTICASONE PROPIONATE 50 MCG
SPRAY, SUSPENSION (ML) NASAL
Qty: 16 G | Refills: 5 | Status: SHIPPED | OUTPATIENT
Start: 2022-11-28

## 2022-12-27 DIAGNOSIS — F90.2 ATTENTION DEFICIT HYPERACTIVITY DISORDER (ADHD), COMBINED TYPE: ICD-10-CM

## 2022-12-27 RX ORDER — DEXTROAMPHETAMINE SACCHARATE, AMPHETAMINE ASPARTATE, DEXTROAMPHETAMINE SULFATE AND AMPHETAMINE SULFATE 5; 5; 5; 5 MG/1; MG/1; MG/1; MG/1
20 TABLET ORAL DAILY
Qty: 30 TABLET | Refills: 0 | Status: SHIPPED | OUTPATIENT
Start: 2022-12-27 | End: 2023-01-26

## 2023-01-25 ENCOUNTER — OFFICE VISIT (OUTPATIENT)
Dept: PRIMARY CARE CLINIC | Age: 48
End: 2023-01-25

## 2023-01-25 VITALS
DIASTOLIC BLOOD PRESSURE: 80 MMHG | BODY MASS INDEX: 33.25 KG/M2 | SYSTOLIC BLOOD PRESSURE: 122 MMHG | WEIGHT: 206 LBS | HEART RATE: 80 BPM | OXYGEN SATURATION: 96 %

## 2023-01-25 DIAGNOSIS — F17.200 SMOKER: ICD-10-CM

## 2023-01-25 DIAGNOSIS — Z13.29 SCREENING FOR THYROID DISORDER: ICD-10-CM

## 2023-01-25 DIAGNOSIS — F90.2 ATTENTION DEFICIT HYPERACTIVITY DISORDER (ADHD), COMBINED TYPE: Primary | ICD-10-CM

## 2023-01-25 DIAGNOSIS — R07.9 CHEST PAIN, UNSPECIFIED TYPE: ICD-10-CM

## 2023-01-25 DIAGNOSIS — Z12.31 SCREENING MAMMOGRAM FOR BREAST CANCER: ICD-10-CM

## 2023-01-25 DIAGNOSIS — Z87.891 PERSONAL HISTORY OF TOBACCO USE, PRESENTING HAZARDS TO HEALTH: ICD-10-CM

## 2023-01-25 DIAGNOSIS — E53.8 VITAMIN B 12 DEFICIENCY: ICD-10-CM

## 2023-01-25 DIAGNOSIS — Z13.6 ENCOUNTER FOR LIPID SCREENING FOR CARDIOVASCULAR DISEASE: ICD-10-CM

## 2023-01-25 DIAGNOSIS — Z13.220 ENCOUNTER FOR LIPID SCREENING FOR CARDIOVASCULAR DISEASE: ICD-10-CM

## 2023-01-25 DIAGNOSIS — E55.9 VITAMIN D DEFICIENCY: ICD-10-CM

## 2023-01-25 DIAGNOSIS — R73.03 PREDIABETES: ICD-10-CM

## 2023-01-25 DIAGNOSIS — R07.81 RIB PAIN ON LEFT SIDE: ICD-10-CM

## 2023-01-25 RX ORDER — DEXTROAMPHETAMINE SACCHARATE, AMPHETAMINE ASPARTATE MONOHYDRATE, DEXTROAMPHETAMINE SULFATE AND AMPHETAMINE SULFATE 5; 5; 5; 5 MG/1; MG/1; MG/1; MG/1
20 CAPSULE, EXTENDED RELEASE ORAL DAILY
Qty: 90 CAPSULE | Refills: 0 | Status: SHIPPED | OUTPATIENT
Start: 2023-01-25 | End: 2023-04-25

## 2023-01-25 RX ORDER — DEXTROAMPHETAMINE SACCHARATE, AMPHETAMINE ASPARTATE, DEXTROAMPHETAMINE SULFATE AND AMPHETAMINE SULFATE 5; 5; 5; 5 MG/1; MG/1; MG/1; MG/1
20 TABLET ORAL DAILY
Qty: 30 TABLET | Refills: 0 | Status: SHIPPED | OUTPATIENT
Start: 2023-01-25 | End: 2023-02-24

## 2023-01-25 ASSESSMENT — ENCOUNTER SYMPTOMS
SINUS PRESSURE: 0
SHORTNESS OF BREATH: 0
VOMITING: 0
COUGH: 0
CHEST TIGHTNESS: 0
EYE ITCHING: 0
WHEEZING: 0
TROUBLE SWALLOWING: 0
NAUSEA: 0
EYE DISCHARGE: 0
EYE REDNESS: 0
ABDOMINAL PAIN: 0
SINUS PAIN: 0
SORE THROAT: 0
DIARRHEA: 0

## 2023-01-25 ASSESSMENT — PATIENT HEALTH QUESTIONNAIRE - PHQ9
1. LITTLE INTEREST OR PLEASURE IN DOING THINGS: 0
SUM OF ALL RESPONSES TO PHQ QUESTIONS 1-9: 0
SUM OF ALL RESPONSES TO PHQ QUESTIONS 1-9: 0
2. FEELING DOWN, DEPRESSED OR HOPELESS: 0
SUM OF ALL RESPONSES TO PHQ QUESTIONS 1-9: 0
SUM OF ALL RESPONSES TO PHQ QUESTIONS 1-9: 0
SUM OF ALL RESPONSES TO PHQ9 QUESTIONS 1 & 2: 0

## 2023-01-25 NOTE — PROGRESS NOTES
[facility] PRIMARY CARE  [address] [facility] [address]  [address] [address]  Dept: [phone]  Dept Fax: [phone]    Celeste Liz is a 50 y.o. female who presents today for her medical conditions/complaintsas noted below. Celeste Liz is c/o of Health Maintenance ([facility] due)        HPI:     Patient presents for an office visit  Blood pressure stable  Weight is up 3 pound since last visit    Patient presents for an office visit for general follow-up  She has seen pain management. They want to stick a needle in her spine. She is not with this. She thought it was just an MRI but they want to stick a needle in her spine to see where the pain is coming from. She is going to continue with stretching and staying active which helps her symptoms and makes them manageable. Adderall is working well. Due for mammogram    Still smoking. Continues to have left sided rib pain. No fall or injury. Concern for her heart  She also has been having multiple hot flashes t/o the day     No other concerns. Past Medical History:   Diagnosis Date    Acute deep vein thrombosis (DVT) of right lower extremity ([address] 2017    Acute internal jugular vein embolism ([address] 2017    Correspondence dated 2017 from [facility] reviewed on 2017, see Media tab of Chart Review for more information. Correspondence dated 2017 from Dr. Dayami Patrick reviewed on 17, see Media tab of Chart Review for more information. Correspondence dated 2017 from Dr. Dayami Patrick reviewed on 2017, see Media tab of Chart Review for more information    Asthma     Bronchitis     DVT (deep venous thrombosis) ([address]       Past Surgical History:   Procedure Laterality Date    APPENDECTOMY      HYSTERECTOMY (CERVIX STATUS UNKNOWN)      TONSILLECTOMY         History reviewed. No pertinent family history.     Social History     Tobacco Use    Smoking status: Every Day     Packs/day: 1.00     Years: 30.00     Pack years: 30.00     Types: Cigarettes    Smokeless tobacco: Never    Tobacco comments:     smokes 1 cigarette in morning, on Chantix   Substance Use Topics    Alcohol use: Yes     Alcohol/week: 2.0 standard drinks     Types: 2 Cans of beer per week      Current Outpatient Medications   Medication Sig Dispense Refill    amphetamine-dextroamphetamine (ADDERALL, 20MG,) 20 MG tablet Take 1 tablet by mouth daily for 30 days. 30 tablet 0    amphetamine-dextroamphetamine (ADDERALL XR) 20 MG extended release capsule Take 1 capsule by mouth daily for 90 days. 90 capsule 0    fluticasone (FLONASE) 50 MCG/ACT nasal spray SHAKE LIQUID AND USE 2 SPRAYS IN EACH NOSTRIL DAILY 16 g 5    omeprazole (PRILOSEC) 40 MG delayed release capsule TAKE 1 CAPSULE BY MOUTH EVERY MORNING BEFORE BREAKFAST 90 capsule 1    tiotropium (SPIRIVA HANDIHALER) 18 MCG inhalation capsule Inhale 1 capsule into the lungs daily 90 capsule 1    budesonide-formoterol (SYMBICORT) 160-4.5 MCG/ACT AERO INHALE 2 PUFFS INTO THE LUNGS TWICE DAILY 30.6 g 3    fexofenadine (ALLEGRA) 180 MG tablet Take 1 tablet by mouth daily 30 tablet 5    azelastine (ASTELIN) 0.1 % nasal spray 2 sprays by Nasal route 2 times daily Use in each nostril as directed 120 mL 1    albuterol sulfate HFA (PROVENTIL;VENTOLIN;PROAIR) 108 (90 Base) MCG/ACT inhaler INHALE 1 TO 2 PUFFS BY MOUTH EVERY 4 HOURS AS NEEDED FOR WHEEZING 8.5 g 5    Calcium Carbonate+Vitamin D 600-200 MG-UNIT TABS Take 1 tablet by mouth daily 90 tablet 1    ibuprofen (ADVIL;MOTRIN) 800 MG tablet Take 1 tablet by mouth every 8 hours as needed for Pain 180 tablet 1    Multiple Vitamins-Minerals (CENTRUM SILVER PO) Take by mouth      aspirin 325 MG tablet Take 325 mg by mouth        No current facility-administered medications for this visit.      Allergies   Allergen Reactions    Pneumovax [Pneumococcal Polysaccharide Vaccine] Swelling     Localized swelling and facial swelling    Prevnar 13 [Pneumococcal 13-Andria Conj Vacc] Swelling       Health Maintenance   Topic Date Due    HIV screen  Never done    Hepatitis C screen  Never done    COVID-19 Vaccine (3 - Booster for Pfizer series) 11/04/2021    Flu vaccine (1) 08/01/2022    Breast cancer screen  11/25/2022    Depression Screen  10/17/2023    DTaP/Tdap/Td vaccine (2 - Td or Tdap) 05/28/2024    Colorectal Cancer Screen  09/28/2024    Lipids  01/21/2027    Hepatitis A vaccine  Aged Out    Hib vaccine  Aged Out    Meningococcal (ACWY) vaccine  Aged Out       :     Review of Systems   Constitutional:  Negative for chills, fatigue and fever. HENT:  Negative for ear discharge, ear pain, sinus pressure, sinus pain, sore throat and trouble swallowing. Eyes:  Negative for discharge, redness and itching. Respiratory:  Negative for cough, chest tightness, shortness of breath and wheezing. Cardiovascular:  Negative for chest pain. Gastrointestinal:  Negative for abdominal pain, diarrhea, nausea and vomiting. Genitourinary:  Negative for difficulty urinating. Musculoskeletal:  Negative for arthralgias and neck pain. Skin:  Negative for rash. Neurological:  Negative for dizziness, weakness, light-headedness and headaches. All other systems reviewed and are negative. Objective:     Physical Exam  Constitutional:       Appearance: Normal appearance. She is obese. HENT:      Head: Normocephalic and atraumatic. Nose: Nose normal.   Eyes:      Extraocular Movements: Extraocular movements intact. Conjunctiva/sclera: Conjunctivae normal.      Pupils: Pupils are equal, round, and reactive to light. Cardiovascular:      Rate and Rhythm: Normal rate and regular rhythm. Pulses: Normal pulses. Heart sounds: Normal heart sounds. Pulmonary:      Effort: Pulmonary effort is normal.      Breath sounds: Normal breath sounds. Abdominal:      General: Abdomen is flat. Palpations: Abdomen is soft.    Musculoskeletal: General: Normal range of motion. Cervical back: Neck supple. Skin:     General: Skin is warm and dry. Capillary Refill: Capillary refill takes less than 2 seconds. Neurological:      General: No focal deficit present. Mental Status: She is alert and oriented to person, place, and time. Psychiatric:         Mood and Affect: Mood normal.     /80   Pulse 80   Wt 206 lb (93.4 kg)   LMP 06/15/2015 (Exact Date)   SpO2 96%   BMI 33.25 kg/m²     Assessment:       Diagnosis Orders   1. Attention deficit hyperactivity disorder (ADHD), combined type  amphetamine-dextroamphetamine (ADDERALL, 20MG,) 20 MG tablet    amphetamine-dextroamphetamine (ADDERALL XR) 20 MG extended release capsule      2. Screening mammogram for breast cancer  RACHANA DIGITAL SCREEN W OR WO CAD BILATERAL      3. Chest pain, unspecified type  ECHO Complete 2D W Doppler W Color      4. Rib pain on left side  XR RIBS LEFT INCLUDE CHEST (MIN 3 VIEWS)      5. Vitamin B 12 deficiency  Vitamin B12 & Folate      6. Vitamin D deficiency  Vitamin D 25 Hydroxy      7. Screening for thyroid disorder  TSH with Reflex      8. Encounter for lipid screening for cardiovascular disease  Lipid Panel    Comprehensive Metabolic Panel      9. Prediabetes  Hemoglobin A1C      10. Smoker  CBC with Auto Differential          :      Return in about 3 months (around 4/25/2023) for PHYSICAL . 1.  ADHD  Continue with Adderall 20 mg extended release daily along with 20 mg immediate release daily  Refill sent  2. Screening for breast cancer  Rx for mammogram  3. Chest pain  Echo ordered  4. Rib pain left side  Rx for x-ray  5-8. Screening  Rx for lab work  9. Prediabetes  Plan to get lab work  Not currently on any medications  Diet controlled  10. Smoker  Discussed looking cessation. Not ready to quit smoking at this time    Follow-up 3 months.   May return sooner if needed  Orders Placed This Encounter   Procedures    RACHANA DIGITAL SCREEN W OR WO CAD BILATERAL     Standing Status:   Future     Standing Expiration Date:   1/25/2024     Order Specific Question:   Reason for exam:     Answer:   screening    XR RIBS LEFT INCLUDE CHEST (MIN 3 VIEWS)     Standing Status:   Future     Standing Expiration Date:   1/25/2024     Order Specific Question:   Reason for exam:     Answer:   intermittent left rib pain    CBC with Auto Differential     Standing Status:   Future     Standing Expiration Date:   1/25/2024    TSH with Reflex     Standing Status:   Future     Standing Expiration Date:   1/25/2024    Lipid Panel     Standing Status:   Future     Standing Expiration Date:   1/25/2024     Order Specific Question:   Is Patient Fasting?/# of Hours     Answer: Fast 8-10 hours    Comprehensive Metabolic Panel     Standing Status:   Future     Standing Expiration Date:   1/25/2024    Vitamin B12 & Folate     Standing Status:   Future     Standing Expiration Date:   1/25/2024    Vitamin D 25 Hydroxy     Standing Status:   Future     Standing Expiration Date:   1/25/2024    Hemoglobin A1C     Standing Status:   Future     Standing Expiration Date:   1/25/2024    ECHO Complete 2D W Doppler W Color     Standing Status:   Future     Standing Expiration Date:   1/25/2024     Order Specific Question:   Reason for exam:     Answer:   persistent chest pain     Orders Placed This Encounter   Medications    amphetamine-dextroamphetamine (ADDERALL, 20MG,) 20 MG tablet     Sig: Take 1 tablet by mouth daily for 30 days. Dispense:  30 tablet     Refill:  0    amphetamine-dextroamphetamine (ADDERALL XR) 20 MG extended release capsule     Sig: Take 1 capsule by mouth daily for 90 days. Dispense:  90 capsule     Refill:  0       Patient given educational materials - seepatient instructions. Discussed use, benefit, and side effects of prescribed medications. All patient questions answered. Pt voiced understanding. Reviewed health maintenance. Instructed to continue current medications, diet and exercise. Patient agreedwith treatment plan. Follow up as directed. Electronically signed by KIN Alejandra CNP on 1/25/2023at 10:08 AM          Tobacco Cessation Counseling: Patient advised about behavior change, including information about personal health harms, usage of appropriate cessation measures and benefits of cessation.   Time spent (minutes): 3-10 min

## 2023-02-03 ENCOUNTER — HOSPITAL ENCOUNTER (OUTPATIENT)
Age: 48
Discharge: HOME | End: 2023-02-03
Payer: COMMERCIAL

## 2023-02-03 DIAGNOSIS — F17.200 SMOKER: ICD-10-CM

## 2023-02-03 DIAGNOSIS — E55.9 VITAMIN D DEFICIENCY: ICD-10-CM

## 2023-02-03 DIAGNOSIS — Z13.220 ENCOUNTER FOR LIPID SCREENING FOR CARDIOVASCULAR DISEASE: ICD-10-CM

## 2023-02-03 DIAGNOSIS — Z13.29 SCREENING FOR THYROID DISORDER: ICD-10-CM

## 2023-02-03 DIAGNOSIS — R73.03 PREDIABETES: ICD-10-CM

## 2023-02-03 DIAGNOSIS — Z13.6 ENCOUNTER FOR LIPID SCREENING FOR CARDIOVASCULAR DISEASE: ICD-10-CM

## 2023-02-03 DIAGNOSIS — E53.8 VITAMIN B 12 DEFICIENCY: ICD-10-CM

## 2023-02-03 LAB
ABSOLUTE EOS #: 0.09 K/UL (ref 0–0.44)
ABSOLUTE IMMATURE GRANULOCYTE: 0.04 K/UL (ref 0–0.3)
ABSOLUTE LYMPH #: 3.78 K/UL (ref 1.1–3.7)
ABSOLUTE MONO #: 0.99 K/UL (ref 0.1–1.2)
ALBUMIN SERPL-MCNC: 4.4 G/DL (ref 3.5–5.2)
ALBUMIN/GLOBULIN RATIO: 1.8 (ref 1–2.5)
ALP SERPL-CCNC: 85 U/L (ref 35–104)
ALT SERPL-CCNC: 22 U/L (ref 5–33)
ANION GAP SERPL CALCULATED.3IONS-SCNC: 12 MMOL/L (ref 9–17)
AST SERPL-CCNC: 20 U/L
BASOPHILS # BLD: 0 % (ref 0–2)
BASOPHILS ABSOLUTE: 0.04 K/UL (ref 0–0.2)
BILIRUB SERPL-MCNC: 0.3 MG/DL (ref 0.3–1.2)
BUN SERPL-MCNC: 16 MG/DL (ref 6–20)
CALCIUM SERPL-MCNC: 9.7 MG/DL (ref 8.6–10.4)
CHLORIDE SERPL-SCNC: 103 MMOL/L (ref 98–107)
CHOLEST SERPL-MCNC: 187 MG/DL
CHOLESTEROL/HDL RATIO: 2.4
CO2 SERPL-SCNC: 25 MMOL/L (ref 20–31)
CREAT SERPL-MCNC: 0.56 MG/DL (ref 0.5–0.9)
EOSINOPHILS RELATIVE PERCENT: 1 % (ref 1–4)
EST. AVERAGE GLUCOSE BLD GHB EST-MCNC: 120 MG/DL
FOLATE SERPL-MCNC: 11.8 NG/ML
GFR SERPL CREATININE-BSD FRML MDRD: >60 ML/MIN/1.73M2
GLUCOSE SERPL-MCNC: 96 MG/DL (ref 70–99)
HBA1C MFR BLD: 5.8 % (ref 4–6)
HCT VFR BLD AUTO: 41.8 % (ref 36.3–47.1)
HDLC SERPL-MCNC: 77 MG/DL
HGB BLD-MCNC: 13.7 G/DL (ref 11.9–15.1)
IMMATURE GRANULOCYTES: 0 %
LDLC SERPL CALC-MCNC: 96 MG/DL (ref 0–130)
LYMPHOCYTES # BLD: 33 % (ref 24–43)
MCH RBC QN AUTO: 31.7 PG (ref 25.2–33.5)
MCHC RBC AUTO-ENTMCNC: 32.8 G/DL (ref 28.4–34.8)
MCV RBC AUTO: 96.8 FL (ref 82.6–102.9)
MONOCYTES # BLD: 9 % (ref 3–12)
NRBC AUTOMATED: 0 PER 100 WBC
PDW BLD-RTO: 14.6 % (ref 11.8–14.4)
PLATELET # BLD AUTO: 308 K/UL (ref 138–453)
PMV BLD AUTO: 10.4 FL (ref 8.1–13.5)
POTASSIUM SERPL-SCNC: 4.2 MMOL/L (ref 3.7–5.3)
PROT SERPL-MCNC: 6.9 G/DL (ref 6.4–8.3)
RBC # BLD: 4.32 M/UL (ref 3.95–5.11)
RBC # BLD: ABNORMAL 10*6/UL
SEG NEUTROPHILS: 57 % (ref 36–65)
SEGMENTED NEUTROPHILS ABSOLUTE COUNT: 6.61 K/UL (ref 1.5–8.1)
SODIUM SERPL-SCNC: 140 MMOL/L (ref 135–144)
TRIGL SERPL-MCNC: 70 MG/DL
TSH SERPL-ACNC: 1.69 UIU/ML (ref 0.3–5)
VIT B12 SERPL-MCNC: 659 PG/ML (ref 232–1245)
WBC # BLD AUTO: 11.6 K/UL (ref 3.5–11.3)

## 2023-02-03 PROCEDURE — 36415 COLL VENOUS BLD VENIPUNCTURE: CPT

## 2023-02-03 PROCEDURE — 80053 COMPREHEN METABOLIC PANEL: CPT

## 2023-02-03 PROCEDURE — 85025 COMPLETE CBC W/AUTO DIFF WBC: CPT

## 2023-02-03 PROCEDURE — 83036 HEMOGLOBIN GLYCOSYLATED A1C: CPT

## 2023-02-03 PROCEDURE — 82746 ASSAY OF FOLIC ACID SERUM: CPT

## 2023-02-03 PROCEDURE — 80061 LIPID PANEL: CPT

## 2023-02-03 PROCEDURE — 82306 VITAMIN D 25 HYDROXY: CPT

## 2023-02-03 PROCEDURE — 84443 ASSAY THYROID STIM HORMONE: CPT

## 2023-02-03 PROCEDURE — 82607 VITAMIN B-12: CPT

## 2023-02-04 LAB — 25(OH)D3 SERPL-MCNC: 32 NG/ML

## 2023-02-17 RX ORDER — TIOTROPIUM BROMIDE 18 UG/1
CAPSULE ORAL; RESPIRATORY (INHALATION)
Qty: 90 CAPSULE | Refills: 1 | Status: SHIPPED | OUTPATIENT
Start: 2023-02-17

## 2023-03-03 ENCOUNTER — HOSPITAL ENCOUNTER (OUTPATIENT)
Dept: NON INVASIVE DIAGNOSTICS | Age: 48
Discharge: HOME | End: 2023-03-03
Payer: COMMERCIAL

## 2023-03-03 ENCOUNTER — HOSPITAL ENCOUNTER (OUTPATIENT)
Dept: MAMMOGRAPHY | Age: 48
End: 2023-03-03
Payer: COMMERCIAL

## 2023-03-03 ENCOUNTER — HOSPITAL ENCOUNTER (OUTPATIENT)
Dept: GENERAL RADIOLOGY | Age: 48
End: 2023-03-03
Payer: COMMERCIAL

## 2023-03-03 ENCOUNTER — HOSPITAL ENCOUNTER (OUTPATIENT)
Age: 48
End: 2023-03-03
Payer: COMMERCIAL

## 2023-03-03 DIAGNOSIS — R07.9 CHEST PAIN, UNSPECIFIED TYPE: ICD-10-CM

## 2023-03-03 DIAGNOSIS — Z12.31 SCREENING MAMMOGRAM FOR BREAST CANCER: ICD-10-CM

## 2023-03-03 DIAGNOSIS — R07.81 RIB PAIN ON LEFT SIDE: ICD-10-CM

## 2023-03-03 LAB
LV EF: 60 %
LVEF MODALITY: NORMAL

## 2023-03-03 PROCEDURE — 77067 SCR MAMMO BI INCL CAD: CPT

## 2023-03-03 PROCEDURE — 71101 X-RAY EXAM UNILAT RIBS/CHEST: CPT

## 2023-03-03 PROCEDURE — 93306 TTE W/DOPPLER COMPLETE: CPT

## 2023-03-21 DIAGNOSIS — F90.2 ATTENTION DEFICIT HYPERACTIVITY DISORDER (ADHD), COMBINED TYPE: ICD-10-CM

## 2023-03-21 RX ORDER — DEXTROAMPHETAMINE SACCHARATE, AMPHETAMINE ASPARTATE, DEXTROAMPHETAMINE SULFATE AND AMPHETAMINE SULFATE 5; 5; 5; 5 MG/1; MG/1; MG/1; MG/1
20 TABLET ORAL DAILY
Qty: 30 TABLET | Refills: 0 | Status: SHIPPED | OUTPATIENT
Start: 2023-03-21 | End: 2023-04-20

## 2023-04-25 ENCOUNTER — OFFICE VISIT (OUTPATIENT)
Dept: PRIMARY CARE CLINIC | Age: 48
End: 2023-04-25
Payer: COMMERCIAL

## 2023-04-25 VITALS
OXYGEN SATURATION: 94 % | SYSTOLIC BLOOD PRESSURE: 128 MMHG | WEIGHT: 205 LBS | BODY MASS INDEX: 32.95 KG/M2 | HEIGHT: 66 IN | DIASTOLIC BLOOD PRESSURE: 78 MMHG | RESPIRATION RATE: 16 BRPM | HEART RATE: 83 BPM

## 2023-04-25 DIAGNOSIS — E78.2 MIXED HYPERLIPIDEMIA: ICD-10-CM

## 2023-04-25 DIAGNOSIS — R73.03 PREDIABETES: ICD-10-CM

## 2023-04-25 DIAGNOSIS — F90.2 ATTENTION DEFICIT HYPERACTIVITY DISORDER (ADHD), COMBINED TYPE: ICD-10-CM

## 2023-04-25 DIAGNOSIS — I83.812 VARICOSE VEINS OF LEFT LOWER EXTREMITY WITH PAIN: ICD-10-CM

## 2023-04-25 DIAGNOSIS — D68.59 HYPERCOAGULABLE STATE (HCC): ICD-10-CM

## 2023-04-25 DIAGNOSIS — J32.9 CHRONIC SINUSITIS, UNSPECIFIED LOCATION: ICD-10-CM

## 2023-04-25 DIAGNOSIS — I82.C29: ICD-10-CM

## 2023-04-25 DIAGNOSIS — Z00.00 ENCOUNTER FOR WELL ADULT EXAM WITHOUT ABNORMAL FINDINGS: Primary | ICD-10-CM

## 2023-04-25 DIAGNOSIS — M46.1 SACROILIITIS, NOT ELSEWHERE CLASSIFIED (HCC): ICD-10-CM

## 2023-04-25 DIAGNOSIS — F17.200 SMOKER: ICD-10-CM

## 2023-04-25 DIAGNOSIS — E66.09 CLASS 1 OBESITY DUE TO EXCESS CALORIES WITH SERIOUS COMORBIDITY AND BODY MASS INDEX (BMI) OF 33.0 TO 33.9 IN ADULT: ICD-10-CM

## 2023-04-25 DIAGNOSIS — J41.8 MIXED SIMPLE AND MUCOPURULENT CHRONIC BRONCHITIS (HCC): ICD-10-CM

## 2023-04-25 PROCEDURE — 99396 PREV VISIT EST AGE 40-64: CPT | Performed by: NURSE PRACTITIONER

## 2023-04-25 RX ORDER — DEXTROAMPHETAMINE SACCHARATE, AMPHETAMINE ASPARTATE MONOHYDRATE, DEXTROAMPHETAMINE SULFATE AND AMPHETAMINE SULFATE 5; 5; 5; 5 MG/1; MG/1; MG/1; MG/1
20 CAPSULE, EXTENDED RELEASE ORAL DAILY
Qty: 90 CAPSULE | Refills: 0 | Status: SHIPPED | OUTPATIENT
Start: 2023-04-25 | End: 2023-07-24

## 2023-04-25 RX ORDER — DEXTROAMPHETAMINE SACCHARATE, AMPHETAMINE ASPARTATE, DEXTROAMPHETAMINE SULFATE AND AMPHETAMINE SULFATE 5; 5; 5; 5 MG/1; MG/1; MG/1; MG/1
20 TABLET ORAL DAILY
Qty: 30 TABLET | Refills: 0 | Status: SHIPPED | OUTPATIENT
Start: 2023-04-25 | End: 2023-05-25

## 2023-04-25 SDOH — ECONOMIC STABILITY: FOOD INSECURITY: WITHIN THE PAST 12 MONTHS, THE FOOD YOU BOUGHT JUST DIDN'T LAST AND YOU DIDN'T HAVE MONEY TO GET MORE.: NEVER TRUE

## 2023-04-25 SDOH — ECONOMIC STABILITY: INCOME INSECURITY: HOW HARD IS IT FOR YOU TO PAY FOR THE VERY BASICS LIKE FOOD, HOUSING, MEDICAL CARE, AND HEATING?: NOT HARD AT ALL

## 2023-04-25 SDOH — ECONOMIC STABILITY: FOOD INSECURITY: WITHIN THE PAST 12 MONTHS, YOU WORRIED THAT YOUR FOOD WOULD RUN OUT BEFORE YOU GOT MONEY TO BUY MORE.: NEVER TRUE

## 2023-04-25 SDOH — ECONOMIC STABILITY: HOUSING INSECURITY
IN THE LAST 12 MONTHS, WAS THERE A TIME WHEN YOU DID NOT HAVE A STEADY PLACE TO SLEEP OR SLEPT IN A SHELTER (INCLUDING NOW)?: NO

## 2023-04-25 ASSESSMENT — ENCOUNTER SYMPTOMS
WHEEZING: 0
SINUS PAIN: 0
SINUS PRESSURE: 1
EYE DISCHARGE: 0
TROUBLE SWALLOWING: 0
SHORTNESS OF BREATH: 0
EYE ITCHING: 0
VOMITING: 0
CHEST TIGHTNESS: 0
DIARRHEA: 0
COUGH: 0
SORE THROAT: 0
ABDOMINAL PAIN: 0
EYE REDNESS: 0
NAUSEA: 0

## 2023-04-25 ASSESSMENT — PATIENT HEALTH QUESTIONNAIRE - PHQ9
SUM OF ALL RESPONSES TO PHQ QUESTIONS 1-9: 0
2. FEELING DOWN, DEPRESSED OR HOPELESS: 0
SUM OF ALL RESPONSES TO PHQ QUESTIONS 1-9: 0
SUM OF ALL RESPONSES TO PHQ9 QUESTIONS 1 & 2: 0
1. LITTLE INTEREST OR PLEASURE IN DOING THINGS: 0

## 2023-04-25 NOTE — PROGRESS NOTES
Well Adult Note  Name: Tani Cam Date: 2023   MRN: [id number] Sex: Female   Age: 50 y.o. Ethnicity: Non- / Non    : 1975 Race: White (non-)      Anahi Gage is here for well adult exam.  History:    Pt presents for her annual physical  Bp stable  Weight is down one lb    Pt presents for her annual physical. Lab work was done before appointment. Doing well on adderall. No concerns with medications     C/o LLE varicose veins. Painful. Seems to be getting worse. Was told not to wear compression stockings. Has not seen anyone for it. Back is ok. Better if she stays active. Unable to sit for longer than 15 minutes at a time. Otherwise she is good if she gets up and moves around. Concern about weight. She has struggled to lose weight since she had her hysterectomy. Eats twice a day. Tries to eat healthy. She knows she needs to quit smoking. Echo and mammogram done and normal.     C/o on going nasal congestion. Worse on left side. Feels like its swollen. This is persistent despite navage, flonase and azelastine. Review of Systems   Constitutional:  Negative for chills, fatigue and fever. HENT:  Positive for congestion and sinus pressure (left side). Negative for ear discharge, ear pain, sinus pain, sore throat and trouble swallowing. Eyes:  Negative for discharge, redness and itching. Respiratory:  Negative for cough, chest tightness, shortness of breath and wheezing. Cardiovascular:  Negative for chest pain. Gastrointestinal:  Negative for abdominal pain, diarrhea, nausea and vomiting. Genitourinary:  Negative for difficulty urinating. Musculoskeletal:  Negative for arthralgias and neck pain. Varicose vein of left leg    Skin:  Negative for rash. Neurological:  Negative for dizziness, weakness, light-headedness and headaches. All other systems reviewed and are negative.     Allergies   Allergen Reactions    Pneumovax [Pneumococcal

## 2023-05-01 DIAGNOSIS — E66.09 CLASS 1 OBESITY DUE TO EXCESS CALORIES WITH SERIOUS COMORBIDITY AND BODY MASS INDEX (BMI) OF 33.0 TO 33.9 IN ADULT: ICD-10-CM

## 2023-05-01 DIAGNOSIS — E78.2 MIXED HYPERLIPIDEMIA: ICD-10-CM

## 2023-05-02 ENCOUNTER — TELEPHONE (OUTPATIENT)
Dept: PRIMARY CARE CLINIC | Age: 48
End: 2023-05-02

## 2023-05-02 NOTE — TELEPHONE ENCOUNTER
Patient called in regarding her [address] approval. States she called her insurance to find out what the hold up was. States she was told next time we do a PA we need to mention that it needs expedited ot it can take 15-20 days for response/approval.    Writer spoke with MA, the PA was faxed yesterday.

## 2023-05-03 DIAGNOSIS — E78.2 MIXED HYPERLIPIDEMIA: ICD-10-CM

## 2023-05-03 DIAGNOSIS — E66.09 CLASS 1 OBESITY DUE TO EXCESS CALORIES WITH SERIOUS COMORBIDITY AND BODY MASS INDEX (BMI) OF 33.0 TO 33.9 IN ADULT: ICD-10-CM

## 2023-05-23 RX ORDER — FLUTICASONE PROPIONATE 50 MCG
SPRAY, SUSPENSION (ML) NASAL
Qty: 16 G | Refills: 5 | Status: SHIPPED | OUTPATIENT
Start: 2023-05-23

## 2023-06-26 ENCOUNTER — OFFICE VISIT (OUTPATIENT)
Dept: PRIMARY CARE CLINIC | Age: 48
End: 2023-06-26
Payer: COMMERCIAL

## 2023-06-26 VITALS
OXYGEN SATURATION: 94 % | BODY MASS INDEX: 32.12 KG/M2 | DIASTOLIC BLOOD PRESSURE: 80 MMHG | SYSTOLIC BLOOD PRESSURE: 122 MMHG | HEART RATE: 84 BPM | WEIGHT: 196 LBS

## 2023-06-26 DIAGNOSIS — F90.2 ATTENTION DEFICIT HYPERACTIVITY DISORDER (ADHD), COMBINED TYPE: ICD-10-CM

## 2023-06-26 DIAGNOSIS — M25.552 LEFT HIP PAIN: Primary | ICD-10-CM

## 2023-06-26 DIAGNOSIS — E66.09 CLASS 1 OBESITY DUE TO EXCESS CALORIES WITH SERIOUS COMORBIDITY AND BODY MASS INDEX (BMI) OF 33.0 TO 33.9 IN ADULT: ICD-10-CM

## 2023-06-26 DIAGNOSIS — J32.9 CHRONIC SINUSITIS, UNSPECIFIED LOCATION: ICD-10-CM

## 2023-06-26 DIAGNOSIS — K59.04 CHRONIC IDIOPATHIC CONSTIPATION: ICD-10-CM

## 2023-06-26 PROCEDURE — 99214 OFFICE O/P EST MOD 30 MIN: CPT | Performed by: NURSE PRACTITIONER

## 2023-06-26 ASSESSMENT — ENCOUNTER SYMPTOMS
WHEEZING: 0
EYE DISCHARGE: 0
VOMITING: 0
COUGH: 0
NAUSEA: 0
SORE THROAT: 0
EYE REDNESS: 0
TROUBLE SWALLOWING: 0
CONSTIPATION: 1
SHORTNESS OF BREATH: 0
SINUS PRESSURE: 0
ABDOMINAL PAIN: 0
DIARRHEA: 0
EYE ITCHING: 0
SINUS PAIN: 0
CHEST TIGHTNESS: 0

## 2023-07-08 ENCOUNTER — HOSPITAL ENCOUNTER (OUTPATIENT)
Dept: CT IMAGING | Age: 48
End: 2023-07-08
Payer: COMMERCIAL

## 2023-07-08 DIAGNOSIS — J34.2 DEVIATED NASAL SEPTUM: ICD-10-CM

## 2023-07-08 DIAGNOSIS — R09.81 NASAL CONGESTION: ICD-10-CM

## 2023-07-08 DIAGNOSIS — J32.0 CHRONIC MAXILLARY SINUSITIS: ICD-10-CM

## 2023-07-08 DIAGNOSIS — J34.3 HYPERTROPHY OF NASAL TURBINATES: ICD-10-CM

## 2023-07-08 DIAGNOSIS — J33.8 OTHER POLYP OF SINUS: ICD-10-CM

## 2023-07-08 PROCEDURE — 70486 CT MAXILLOFACIAL W/O DYE: CPT

## 2023-07-14 ENCOUNTER — OFFICE VISIT (OUTPATIENT)
Dept: ORTHOPEDIC SURGERY | Age: 48
End: 2023-07-14
Payer: COMMERCIAL

## 2023-07-14 DIAGNOSIS — M25.552 PAIN OF LEFT HIP: Primary | ICD-10-CM

## 2023-07-14 PROCEDURE — 99203 OFFICE O/P NEW LOW 30 MIN: CPT | Performed by: ORTHOPAEDIC SURGERY

## 2023-07-14 NOTE — PROGRESS NOTES
Packs/day: 1.00     Years: 30.00     Pack years: 30.00     Types: Cigarettes    Smokeless tobacco: Never    Tobacco comments:     smokes 1 cigarette in morning, on Chantix   Substance and Sexual Activity    Alcohol use: Yes     Alcohol/week: 2.0 standard drinks     Types: 2 Cans of beer per week    Drug use: No    Sexual activity: Not on file   Other Topics Concern    Not on file   Social History Narrative    Not on file     Social Determinants of Health     Financial Resource Strain: Low Risk     Difficulty of Paying Living Expenses: Not hard at all   Food Insecurity: No Food Insecurity    Worried About [place] in the Last Year: Never true    [address] in the Last Year: Never true   Transportation Needs: Unknown    Lack of Transportation (Medical): Not on file    Lack of Transportation (Non-Medical): No   Physical Activity: Not on file   Stress: Not on file   Social Connections: Not on file   Intimate Partner Violence: Not on file   Housing Stability: Unknown    Unable to Pay for Housing in the Last Year: Not on file    Number of Places Lived in the Last Year: Not on file    Unstable Housing in the Last Year: No     Past Medical History:   Diagnosis Date    Acute deep vein thrombosis (DVT) of right lower extremity ([address]) 2/16/2017    Acute internal jugular vein embolism ([address]) 2/16/2017    Correspondence dated February 21 2017 from [facility] reviewed on 2/22/2017, see Media tab of Chart Review for more information. Correspondence dated July 19, 2017 from Dr. Tano Welch reviewed on 7/20/17, see Media tab of Chart Review for more information. Correspondence dated August 16, 2017 from Dr. Tano Welch reviewed on 8/18/2017, see Media tab of Chart Review for more information    Asthma     Bronchitis     DVT (deep venous thrombosis) [facility])      Past Surgical History:   Procedure Laterality Date    APPENDECTOMY      HYSTERECTOMY (CERVIX STATUS UNKNOWN)      TONSILLECTOMY       No family history on file.

## 2023-07-19 DIAGNOSIS — F90.2 ATTENTION DEFICIT HYPERACTIVITY DISORDER (ADHD), COMBINED TYPE: ICD-10-CM

## 2023-07-19 RX ORDER — DEXTROAMPHETAMINE SACCHARATE, AMPHETAMINE ASPARTATE, DEXTROAMPHETAMINE SULFATE AND AMPHETAMINE SULFATE 5; 5; 5; 5 MG/1; MG/1; MG/1; MG/1
20 TABLET ORAL DAILY
Qty: 30 TABLET | Refills: 0 | Status: SHIPPED | OUTPATIENT
Start: 2023-07-19 | End: 2023-08-18

## 2023-07-19 RX ORDER — DEXTROAMPHETAMINE SACCHARATE, AMPHETAMINE ASPARTATE MONOHYDRATE, DEXTROAMPHETAMINE SULFATE AND AMPHETAMINE SULFATE 5; 5; 5; 5 MG/1; MG/1; MG/1; MG/1
20 CAPSULE, EXTENDED RELEASE ORAL DAILY
Qty: 90 CAPSULE | Refills: 0 | Status: SHIPPED | OUTPATIENT
Start: 2023-07-19 | End: 2023-10-17

## 2023-08-16 DIAGNOSIS — J41.8 MIXED SIMPLE AND MUCOPURULENT CHRONIC BRONCHITIS (HCC): ICD-10-CM

## 2023-08-16 RX ORDER — BUDESONIDE AND FORMOTEROL FUMARATE DIHYDRATE 160; 4.5 UG/1; UG/1
AEROSOL RESPIRATORY (INHALATION)
Qty: 30.6 G | Refills: 3 | Status: SHIPPED | OUTPATIENT
Start: 2023-08-16

## 2023-08-28 ENCOUNTER — OFFICE VISIT (OUTPATIENT)
Dept: PRIMARY CARE CLINIC | Age: 48
End: 2023-08-28
Payer: COMMERCIAL

## 2023-08-28 VITALS
SYSTOLIC BLOOD PRESSURE: 118 MMHG | OXYGEN SATURATION: 96 % | WEIGHT: 196.8 LBS | DIASTOLIC BLOOD PRESSURE: 80 MMHG | HEART RATE: 81 BPM | BODY MASS INDEX: 32.25 KG/M2

## 2023-08-28 DIAGNOSIS — R73.03 PREDIABETES: Primary | ICD-10-CM

## 2023-08-28 DIAGNOSIS — E66.09 CLASS 1 OBESITY DUE TO EXCESS CALORIES WITH SERIOUS COMORBIDITY AND BODY MASS INDEX (BMI) OF 33.0 TO 33.9 IN ADULT: ICD-10-CM

## 2023-08-28 DIAGNOSIS — M51.36 BULGING LUMBAR DISC: ICD-10-CM

## 2023-08-28 DIAGNOSIS — J32.9 CHRONIC SINUSITIS, UNSPECIFIED LOCATION: ICD-10-CM

## 2023-08-28 LAB — HBA1C MFR BLD: 5.4 %

## 2023-08-28 PROCEDURE — 99214 OFFICE O/P EST MOD 30 MIN: CPT | Performed by: NURSE PRACTITIONER

## 2023-08-28 PROCEDURE — 83036 HEMOGLOBIN GLYCOSYLATED A1C: CPT | Performed by: NURSE PRACTITIONER

## 2023-08-28 ASSESSMENT — ENCOUNTER SYMPTOMS
TROUBLE SWALLOWING: 0
CHEST TIGHTNESS: 0
SORE THROAT: 0
EYE ITCHING: 0
COUGH: 0
WHEEZING: 0
NAUSEA: 0
SINUS PRESSURE: 0
EYE REDNESS: 0
VOMITING: 0
SINUS PAIN: 0
DIARRHEA: 0
ABDOMINAL PAIN: 0
EYE DISCHARGE: 0
SHORTNESS OF BREATH: 0

## 2023-08-28 NOTE — PROGRESS NOTES
Sax[place]. She is trying to watch her diet and lose weight. Not on any medication for prediabetes  2. Obesity  Stop Matilde  Going to try Adarsh  3. Chronic sinusitis  Due to have no surgery in the end of December  4. Bulging lumbar disc  Patient is going to follow-up after her no surgery for referral to pain management    Follow-up in 3 months. May return sooner if needed  Orders Placed This Encounter   Procedures    POCT glycosylated hemoglobin (Hb A1C)     Orders Placed This Encounter   Medications    mupirocin (BACTROBAN) 2 % ointment     Sig: Apply topically 3 times daily. Dispense:  3 g     Refill:  0       Patient given educational materials - seepatient instructions. Discussed use, benefit, and side effects of prescribed medications. All patient questions answered. Pt voiced understanding. Reviewed health maintenance. Instructed to continue current medications, diet and exercise. Patient agreedwith treatment plan. Follow up as directed.       Electronically signed by KIN Soto CNP on 8/28/2023at 3:50 PM

## 2023-09-06 DIAGNOSIS — F90.2 ATTENTION DEFICIT HYPERACTIVITY DISORDER (ADHD), COMBINED TYPE: ICD-10-CM

## 2023-09-07 RX ORDER — FLUTICASONE PROPIONATE 50 MCG
2 SPRAY, SUSPENSION (ML) NASAL DAILY
Qty: 16 G | Refills: 5 | Status: SHIPPED | OUTPATIENT
Start: 2023-09-07

## 2023-09-07 RX ORDER — DEXTROAMPHETAMINE SACCHARATE, AMPHETAMINE ASPARTATE, DEXTROAMPHETAMINE SULFATE AND AMPHETAMINE SULFATE 5; 5; 5; 5 MG/1; MG/1; MG/1; MG/1
20 TABLET ORAL DAILY
Qty: 30 TABLET | Refills: 0 | Status: SHIPPED | OUTPATIENT
Start: 2023-09-07 | End: 2023-10-07

## 2023-09-19 DIAGNOSIS — J41.8 MIXED SIMPLE AND MUCOPURULENT CHRONIC BRONCHITIS (HCC): ICD-10-CM

## 2023-09-19 RX ORDER — ALBUTEROL SULFATE 90 UG/1
AEROSOL, METERED RESPIRATORY (INHALATION)
Qty: 8.5 G | Refills: 5 | Status: SHIPPED | OUTPATIENT
Start: 2023-09-19

## 2023-10-05 DIAGNOSIS — M51.36 DEGENERATIVE DISC DISEASE, LUMBAR: ICD-10-CM

## 2023-10-05 RX ORDER — IBUPROFEN 800 MG/1
800 TABLET ORAL EVERY 8 HOURS PRN
Qty: 180 TABLET | Refills: 1 | Status: SHIPPED | OUTPATIENT
Start: 2023-10-05 | End: 2023-12-22

## 2023-10-19 DIAGNOSIS — F90.2 ATTENTION DEFICIT HYPERACTIVITY DISORDER (ADHD), COMBINED TYPE: ICD-10-CM

## 2023-10-19 RX ORDER — DEXTROAMPHETAMINE SACCHARATE, AMPHETAMINE ASPARTATE, DEXTROAMPHETAMINE SULFATE AND AMPHETAMINE SULFATE 5; 5; 5; 5 MG/1; MG/1; MG/1; MG/1
20 TABLET ORAL DAILY
Qty: 30 TABLET | Refills: 0 | Status: SHIPPED | OUTPATIENT
Start: 2023-10-19 | End: 2023-11-18

## 2023-11-28 ENCOUNTER — OFFICE VISIT (OUTPATIENT)
Dept: PRIMARY CARE CLINIC | Age: 48
End: 2023-11-28
Payer: COMMERCIAL

## 2023-11-28 VITALS
OXYGEN SATURATION: 97 % | HEART RATE: 97 BPM | SYSTOLIC BLOOD PRESSURE: 128 MMHG | BODY MASS INDEX: 32.78 KG/M2 | WEIGHT: 200 LBS | DIASTOLIC BLOOD PRESSURE: 82 MMHG | RESPIRATION RATE: 18 BRPM

## 2023-11-28 DIAGNOSIS — M51.26 DISPLACEMENT OF LUMBAR INTERVERTEBRAL DISC WITHOUT MYELOPATHY: ICD-10-CM

## 2023-11-28 DIAGNOSIS — M47.26 OSTEOARTHRITIS OF SPINE WITH RADICULOPATHY, LUMBAR REGION: ICD-10-CM

## 2023-11-28 DIAGNOSIS — K58.2 IRRITABLE BOWEL SYNDROME WITH BOTH CONSTIPATION AND DIARRHEA: ICD-10-CM

## 2023-11-28 DIAGNOSIS — F90.2 ATTENTION DEFICIT HYPERACTIVITY DISORDER (ADHD), COMBINED TYPE: Primary | ICD-10-CM

## 2023-11-28 PROCEDURE — 99214 OFFICE O/P EST MOD 30 MIN: CPT | Performed by: NURSE PRACTITIONER

## 2023-11-28 RX ORDER — DEXTROAMPHETAMINE SACCHARATE, AMPHETAMINE ASPARTATE, DEXTROAMPHETAMINE SULFATE AND AMPHETAMINE SULFATE 5; 5; 5; 5 MG/1; MG/1; MG/1; MG/1
20 TABLET ORAL DAILY
Qty: 30 TABLET | Refills: 0 | Status: SHIPPED | OUTPATIENT
Start: 2023-11-28 | End: 2023-12-28

## 2023-11-28 RX ORDER — OXYCODONE HYDROCHLORIDE AND ACETAMINOPHEN 5; 325 MG/1; MG/1
1 TABLET ORAL DAILY PRN
Qty: 6 TABLET | Refills: 0 | Status: SHIPPED | OUTPATIENT
Start: 2023-11-28 | End: 2023-12-04

## 2023-11-28 RX ORDER — SENNA AND DOCUSATE SODIUM 50; 8.6 MG/1; MG/1
TABLET, FILM COATED ORAL
Qty: 90 TABLET | Refills: 0 | Status: SHIPPED | OUTPATIENT
Start: 2023-11-28

## 2023-11-28 RX ORDER — DEXTROAMPHETAMINE SACCHARATE, AMPHETAMINE ASPARTATE MONOHYDRATE, DEXTROAMPHETAMINE SULFATE AND AMPHETAMINE SULFATE 5; 5; 5; 5 MG/1; MG/1; MG/1; MG/1
20 CAPSULE, EXTENDED RELEASE ORAL DAILY
Qty: 90 CAPSULE | Refills: 0 | Status: SHIPPED | OUTPATIENT
Start: 2023-11-28 | End: 2024-02-26

## 2023-11-28 ASSESSMENT — ENCOUNTER SYMPTOMS
NAUSEA: 0
DIARRHEA: 1
COUGH: 0
CHEST TIGHTNESS: 0
VOMITING: 0
SHORTNESS OF BREATH: 0
TROUBLE SWALLOWING: 0
BACK PAIN: 1
SINUS PRESSURE: 0
WHEEZING: 0
EYE REDNESS: 0
EYE ITCHING: 0
CONSTIPATION: 1
EYE DISCHARGE: 0
SORE THROAT: 0
ABDOMINAL PAIN: 1
SINUS PAIN: 0

## 2023-11-28 ASSESSMENT — PATIENT HEALTH QUESTIONNAIRE - PHQ9
SUM OF ALL RESPONSES TO PHQ QUESTIONS 1-9: 0
SUM OF ALL RESPONSES TO PHQ QUESTIONS 1-9: 0
2. FEELING DOWN, DEPRESSED OR HOPELESS: 0
SUM OF ALL RESPONSES TO PHQ QUESTIONS 1-9: 0
SUM OF ALL RESPONSES TO PHQ9 QUESTIONS 1 & 2: 0
1. LITTLE INTEREST OR PLEASURE IN DOING THINGS: 0
SUM OF ALL RESPONSES TO PHQ QUESTIONS 1-9: 0

## 2023-11-28 NOTE — PROGRESS NOTES
Musculoskeletal:  Positive for back pain. Negative for arthralgias and neck pain. Skin:  Negative for rash. Neurological:  Negative for dizziness, weakness, light-headedness and headaches. All other systems reviewed and are negative. Objective:     Physical Exam  Constitutional:       General: She is not in acute distress. Appearance: Normal appearance. She is normal weight. She is not ill-appearing. HENT:      Head: Normocephalic and atraumatic. Nose: Nose normal. No congestion or rhinorrhea. Mouth/Throat:      Mouth: Mucous membranes are moist.      Pharynx: Oropharynx is clear. No oropharyngeal exudate or posterior oropharyngeal erythema. Eyes:      Extraocular Movements: Extraocular movements intact. Conjunctiva/sclera: Conjunctivae normal.      Pupils: Pupils are equal, round, and reactive to light. Cardiovascular:      Rate and Rhythm: Normal rate and regular rhythm. Pulses: Normal pulses. Heart sounds: Normal heart sounds. No murmur heard. Pulmonary:      Effort: Pulmonary effort is normal. No respiratory distress. Breath sounds: Normal breath sounds. No wheezing. Chest:       Abdominal:      General: Abdomen is flat. Bowel sounds are normal. There is no distension. Palpations: Abdomen is soft. Tenderness: There is no abdominal tenderness. There is no guarding. Musculoskeletal:         General: Normal range of motion. Cervical back: Normal range of motion and neck supple. Skin:     General: Skin is warm and dry. Capillary Refill: Capillary refill takes less than 2 seconds. Neurological:      General: No focal deficit present. Mental Status: She is alert and oriented to person, place, and time. Motor: No weakness. Coordination: Coordination normal.      Gait: Gait normal.   Psychiatric:         Mood and Affect: Mood normal.         Behavior: Behavior normal.         Thought Content:  Thought content normal.     BP

## 2023-12-28 ENCOUNTER — HOSPITAL ENCOUNTER (OUTPATIENT)
Age: 48
Discharge: HOME | End: 2023-12-28
Payer: COMMERCIAL

## 2023-12-28 LAB
CRP SERPL HS-MCNC: 6.7 MG/L (ref 0–5)
ERYTHROCYTE [SEDIMENTATION RATE] IN BLOOD BY PHOTOMETRIC METHOD: 2 MM/HR (ref 0–30)
IGA SERPL-MCNC: 148 MG/DL (ref 70–400)

## 2023-12-28 PROCEDURE — 86140 C-REACTIVE PROTEIN: CPT

## 2023-12-28 PROCEDURE — 82784 ASSAY IGA/IGD/IGG/IGM EACH: CPT

## 2023-12-28 PROCEDURE — 36415 COLL VENOUS BLD VENIPUNCTURE: CPT

## 2023-12-28 PROCEDURE — 85652 RBC SED RATE AUTOMATED: CPT

## 2023-12-28 PROCEDURE — 83516 IMMUNOASSAY NONANTIBODY: CPT

## 2023-12-29 LAB — TTG IGA SER IA-ACNC: 0.5 U/ML

## 2024-01-30 DIAGNOSIS — F90.2 ATTENTION DEFICIT HYPERACTIVITY DISORDER (ADHD), COMBINED TYPE: ICD-10-CM

## 2024-01-30 RX ORDER — DEXTROAMPHETAMINE SACCHARATE, AMPHETAMINE ASPARTATE, DEXTROAMPHETAMINE SULFATE AND AMPHETAMINE SULFATE 5; 5; 5; 5 MG/1; MG/1; MG/1; MG/1
20 TABLET ORAL DAILY
Qty: 30 TABLET | Refills: 0 | Status: SHIPPED | OUTPATIENT
Start: 2024-01-30 | End: 2024-02-29

## 2024-02-28 ENCOUNTER — OFFICE VISIT (OUTPATIENT)
Dept: PRIMARY CARE CLINIC | Age: 49
End: 2024-02-28
Payer: COMMERCIAL

## 2024-02-28 VITALS
OXYGEN SATURATION: 94 % | RESPIRATION RATE: 18 BRPM | DIASTOLIC BLOOD PRESSURE: 76 MMHG | HEART RATE: 97 BPM | WEIGHT: 203.4 LBS | BODY MASS INDEX: 33.33 KG/M2 | SYSTOLIC BLOOD PRESSURE: 118 MMHG

## 2024-02-28 DIAGNOSIS — R73.03 PREDIABETES: ICD-10-CM

## 2024-02-28 DIAGNOSIS — Z12.11 COLON CANCER SCREENING: ICD-10-CM

## 2024-02-28 DIAGNOSIS — M51.26 DISPLACEMENT OF LUMBAR INTERVERTEBRAL DISC WITHOUT MYELOPATHY: ICD-10-CM

## 2024-02-28 DIAGNOSIS — Z13.29 SCREENING FOR THYROID DISORDER: ICD-10-CM

## 2024-02-28 DIAGNOSIS — E78.2 MIXED HYPERLIPIDEMIA: ICD-10-CM

## 2024-02-28 DIAGNOSIS — J41.8 MIXED SIMPLE AND MUCOPURULENT CHRONIC BRONCHITIS (HCC): ICD-10-CM

## 2024-02-28 DIAGNOSIS — M47.26 OSTEOARTHRITIS OF SPINE WITH RADICULOPATHY, LUMBAR REGION: ICD-10-CM

## 2024-02-28 DIAGNOSIS — Z12.31 BREAST CANCER SCREENING BY MAMMOGRAM: ICD-10-CM

## 2024-02-28 DIAGNOSIS — F90.2 ATTENTION DEFICIT HYPERACTIVITY DISORDER (ADHD), COMBINED TYPE: Primary | ICD-10-CM

## 2024-02-28 DIAGNOSIS — I82.C29: ICD-10-CM

## 2024-02-28 DIAGNOSIS — D68.59 HYPERCOAGULABLE STATE (HCC): ICD-10-CM

## 2024-02-28 DIAGNOSIS — M46.1 SACROILIITIS, NOT ELSEWHERE CLASSIFIED (HCC): ICD-10-CM

## 2024-02-28 DIAGNOSIS — F90.2 ATTENTION DEFICIT HYPERACTIVITY DISORDER (ADHD), COMBINED TYPE: ICD-10-CM

## 2024-02-28 PROCEDURE — 99214 OFFICE O/P EST MOD 30 MIN: CPT | Performed by: NURSE PRACTITIONER

## 2024-02-28 RX ORDER — OXYCODONE HYDROCHLORIDE AND ACETAMINOPHEN 5; 325 MG/1; MG/1
1 TABLET ORAL NIGHTLY PRN
Qty: 30 TABLET | Refills: 0 | Status: SHIPPED | OUTPATIENT
Start: 2024-02-28 | End: 2024-03-29

## 2024-02-28 RX ORDER — TIOTROPIUM BROMIDE 18 UG/1
CAPSULE ORAL; RESPIRATORY (INHALATION)
Qty: 90 CAPSULE | Refills: 2 | Status: SHIPPED | OUTPATIENT
Start: 2024-02-28

## 2024-02-28 RX ORDER — DEXTROAMPHETAMINE SACCHARATE, AMPHETAMINE ASPARTATE MONOHYDRATE, DEXTROAMPHETAMINE SULFATE AND AMPHETAMINE SULFATE 5; 5; 5; 5 MG/1; MG/1; MG/1; MG/1
20 CAPSULE, EXTENDED RELEASE ORAL DAILY
Qty: 90 CAPSULE | Refills: 0 | Status: SHIPPED | OUTPATIENT
Start: 2024-02-28 | End: 2024-05-28

## 2024-02-28 RX ORDER — DEXTROAMPHETAMINE SACCHARATE, AMPHETAMINE ASPARTATE, DEXTROAMPHETAMINE SULFATE AND AMPHETAMINE SULFATE 5; 5; 5; 5 MG/1; MG/1; MG/1; MG/1
20 TABLET ORAL DAILY
Qty: 30 TABLET | Refills: 0 | Status: SHIPPED | OUTPATIENT
Start: 2024-02-28 | End: 2024-02-28 | Stop reason: SDUPTHER

## 2024-02-28 RX ORDER — DEXTROAMPHETAMINE SACCHARATE, AMPHETAMINE ASPARTATE, DEXTROAMPHETAMINE SULFATE AND AMPHETAMINE SULFATE 5; 5; 5; 5 MG/1; MG/1; MG/1; MG/1
20 TABLET ORAL DAILY
Qty: 30 TABLET | Refills: 0 | Status: SHIPPED | OUTPATIENT
Start: 2024-02-28 | End: 2024-03-29

## 2024-02-28 ASSESSMENT — PATIENT HEALTH QUESTIONNAIRE - PHQ9
2. FEELING DOWN, DEPRESSED OR HOPELESS: 0
SUM OF ALL RESPONSES TO PHQ QUESTIONS 1-9: 0
SUM OF ALL RESPONSES TO PHQ9 QUESTIONS 1 & 2: 0
SUM OF ALL RESPONSES TO PHQ QUESTIONS 1-9: 0
1. LITTLE INTEREST OR PLEASURE IN DOING THINGS: 0

## 2024-02-28 ASSESSMENT — ENCOUNTER SYMPTOMS
CHEST TIGHTNESS: 0
SINUS PRESSURE: 0
BACK PAIN: 1
WHEEZING: 0
TROUBLE SWALLOWING: 0
EYE DISCHARGE: 0
EYE ITCHING: 0
EYE REDNESS: 0
COUGH: 0
SORE THROAT: 0
DIARRHEA: 0
VOMITING: 0
NAUSEA: 0
SINUS PAIN: 0
ABDOMINAL PAIN: 0
SHORTNESS OF BREATH: 0

## 2024-02-28 NOTE — PROGRESS NOTES
[facility]  [facility] PRIMARY [facility]ARE  [address] [place] [address] [address]  [address]  [facility] [address]  Dept: [phone]  Dept Fax: [phone]    Eve Vázquez is a 48 y.o. female who presents today for her medical conditions/complaintsas noted below.  Eve Vázquez is c/o of ADHD (3 mo f/u)        HPI:     Patient presents for an office visit  Blood pressure stable  Weight is stable    Patient presents for her 3-month follow-up.  She has been seeing a nurse practitioner that is located within her work.  She did go to GI however her colonoscopy was not covered.  She does need a new GI specialist that is within her network    The nurse practitioner at her work did give her a steroid injection.  This did help with her back pain for a few days but her back pain is getting worse despite physical therapy and supportive care measures.  She did have a short course of Percocet which did help with the pain.  She really needs to take this before she goes to bed.  She is now willing to go to pain management.  She would like to get evaluated for injections.    She is due for her mammogram and lab work        Past Medical History:   Diagnosis Date    Acute deep vein thrombosis (DVT) of right lower extremity ([facility]) 2/16/2017    Acute internal jugular vein embolism ([facility]) 2/16/2017    Correspondence dated February 21 2017 from  reviewed on 2/22/2017, see Media tab of Chart Review for more information.  Correspondence dated July 19, 2017 from Dr. Crisostomo reviewed on 7/20/17, see Media tab of Chart Review for more information. Correspondence dated August 16, 2017 from Dr. Crisostomo reviewed on 8/18/2017, see Media tab of Chart Review for more information    Asthma     Bronchitis     DVT (deep venous thrombosis) (HCC)       Past Surgical History:   Procedure Laterality Date    APPENDECTOMY      HYSTERECTOMY (CERVIX STATUS UNKNOWN)      TONSILLECTOMY         History reviewed. No pertinent family history.    Social

## 2024-02-29 ENCOUNTER — TELEPHONE (OUTPATIENT)
Dept: GASTROENTEROLOGY | Age: 49
End: 2024-02-29

## 2024-02-29 DIAGNOSIS — Z12.11 COLON CANCER SCREENING: Primary | ICD-10-CM

## 2024-02-29 NOTE — TELEPHONE ENCOUNTER
Writer rec'd referral from PCP to schedule pt for colon, writer notes pt is not established with any provider in this practice, and per colon screen questionnaire pt can be scheduled for colon. Writer scheduled pt for colon screen at [facility] 4/19/24@12:30pm. Pt states she was scheduled last year for a colon and still has prep, pt wants to use that prep, pt is unsure of the name, pt states she will call the office back with the name of prep medication, writer will go over prep with pt at that time.

## 2024-03-01 NOTE — TELEPHONE ENCOUNTER
Writer returned pt call, pt states she has clenpiq at home, writer reviewed prep sheet and mailed information to pt house, pt voices understanding.

## 2024-03-19 ENCOUNTER — INITIAL CONSULT (OUTPATIENT)
Dept: PAIN MANAGEMENT | Age: 49
End: 2024-03-19
Payer: COMMERCIAL

## 2024-03-19 VITALS — WEIGHT: 203 LBS | BODY MASS INDEX: 33.82 KG/M2 | OXYGEN SATURATION: 95 % | HEIGHT: 65 IN | HEART RATE: 91 BPM

## 2024-03-19 DIAGNOSIS — M54.16 LUMBAR RADICULOPATHY: ICD-10-CM

## 2024-03-19 DIAGNOSIS — M51.26 LUMBAR DISC HERNIATION: Primary | ICD-10-CM

## 2024-03-19 PROCEDURE — 99244 OFF/OP CNSLTJ NEW/EST MOD 40: CPT | Performed by: ANESTHESIOLOGY

## 2024-03-19 ASSESSMENT — ENCOUNTER SYMPTOMS
NAUSEA: 0
EYE ITCHING: 0
SHORTNESS OF BREATH: 0
DIARRHEA: 0
BACK PAIN: 1
GASTROINTESTINAL NEGATIVE: 1
SORE THROAT: 0
CHEST TIGHTNESS: 0
ALLERGIC/IMMUNOLOGIC NEGATIVE: 1
EYES NEGATIVE: 1
RESPIRATORY NEGATIVE: 1
CONSTIPATION: 0
VOMITING: 0

## 2024-03-19 NOTE — PROGRESS NOTES
30 days. Take lowest dose possible to manage pain Max Daily Amount: 1 tablet 30 tablet 0    tiotropium (SPIRIVA HANDIHALER) 18 MCG inhalation capsule INHALE THE CONTENTS OF 1 CAPSULE USING THE HANDIHALER INTO THE LUNGS DAILY 90 capsule 2    amphetamine-dextroamphetamine (ADDERALL, 20MG,) 20 MG tablet Take 1 tablet by mouth daily for 30 days. 30 tablet 0    albuterol sulfate HFA (PROVENTIL;VENTOLIN;PROAIR) 108 (90 Base) MCG/ACT inhaler INHALE 1 TO 2 PUFFS BY MOUTH EVERY 4 HOURS AS NEEDED FOR WHEEZING 8.5 g 5    sennosides-docusate sodium (SENOKOT-S) 8.6-50 MG tablet Take 1-2 tablets daily until desired bm, then 1 daily 90 tablet 0    budesonide-formoterol (SYMBICORT) 160-4.5 MCG/ACT AERO INHALE 2 PUFFS INTO THE LUNGS TWICE DAILY 30.6 g 3    omeprazole (PRILOSEC) 40 MG delayed release capsule TAKE 1 CAPSULE BY MOUTH EVERY MORNING BEFORE BREAKFAST 90 capsule 1    fexofenadine (ALLEGRA) 180 MG tablet Take 1 tablet by mouth daily 30 tablet 5    azelastine (ASTELIN) 0.1 % nasal spray 2 sprays by Nasal route 2 times daily Use in each nostril as directed 120 mL 1    Calcium Carbonate+Vitamin D 600-200 MG-UNIT TABS Take 1 tablet by mouth daily 90 tablet 1    Multiple Vitamins-Minerals (CENTRUM SILVER PO) Take by mouth      aspirin 325 MG tablet Take 1 tablet by mouth      ibuprofen (ADVIL;MOTRIN) 800 MG tablet Take 1 tablet by mouth every 8 hours as needed for Pain 180 tablet 1     No current facility-administered medications for this visit.       Review of Systems   Constitutional: Negative.  Negative for chills and fever.   HENT: Negative.  Negative for sneezing and sore throat.    Eyes: Negative.  Negative for itching.   Respiratory: Negative.  Negative for chest tightness and shortness of breath.    Cardiovascular: Negative.  Negative for chest pain and palpitations.   Gastrointestinal: Negative.  Negative for constipation, diarrhea, nausea and vomiting.   Endocrine: Negative.  Negative for heat intolerance.

## 2024-04-16 DIAGNOSIS — F90.2 ATTENTION DEFICIT HYPERACTIVITY DISORDER (ADHD), COMBINED TYPE: ICD-10-CM

## 2024-04-16 RX ORDER — DEXTROAMPHETAMINE SACCHARATE, AMPHETAMINE ASPARTATE, DEXTROAMPHETAMINE SULFATE AND AMPHETAMINE SULFATE 5; 5; 5; 5 MG/1; MG/1; MG/1; MG/1
20 TABLET ORAL DAILY
Qty: 30 TABLET | Refills: 0 | Status: SHIPPED | OUTPATIENT
Start: 2024-04-16 | End: 2024-05-16

## 2024-04-16 RX ORDER — DEXTROAMPHETAMINE SACCHARATE, AMPHETAMINE ASPARTATE MONOHYDRATE, DEXTROAMPHETAMINE SULFATE AND AMPHETAMINE SULFATE 5; 5; 5; 5 MG/1; MG/1; MG/1; MG/1
20 CAPSULE, EXTENDED RELEASE ORAL DAILY
Qty: 90 CAPSULE | Refills: 0 | Status: SHIPPED | OUTPATIENT
Start: 2024-04-16 | End: 2024-07-15

## 2024-04-19 ENCOUNTER — ANESTHESIA (OUTPATIENT)
Dept: OPERATING ROOM | Age: 49
End: 2024-04-19
Payer: COMMERCIAL

## 2024-04-19 ENCOUNTER — ANESTHESIA EVENT (OUTPATIENT)
Dept: OPERATING ROOM | Age: 49
End: 2024-04-19
Payer: COMMERCIAL

## 2024-04-19 ENCOUNTER — HOSPITAL ENCOUNTER (OUTPATIENT)
Age: 49
Setting detail: OUTPATIENT SURGERY
Discharge: HOME | End: 2024-04-19
Attending: INTERNAL MEDICINE | Admitting: INTERNAL MEDICINE
Payer: COMMERCIAL

## 2024-04-19 VITALS
DIASTOLIC BLOOD PRESSURE: 87 MMHG | TEMPERATURE: 97.7 F | HEIGHT: 65 IN | WEIGHT: 200 LBS | OXYGEN SATURATION: 95 % | BODY MASS INDEX: 33.32 KG/M2 | RESPIRATION RATE: 13 BRPM | SYSTOLIC BLOOD PRESSURE: 136 MMHG | HEART RATE: 63 BPM

## 2024-04-19 PROCEDURE — 3609027000 HC COLONOSCOPY: Performed by: INTERNAL MEDICINE

## 2024-04-19 PROCEDURE — 3700000001 HC ADD 15 MINUTES (ANESTHESIA): Performed by: INTERNAL MEDICINE

## 2024-04-19 PROCEDURE — 2580000003 HC RX 258: Performed by: ANESTHESIOLOGY

## 2024-04-19 PROCEDURE — 7100000011 HC PHASE II RECOVERY - ADDTL 15 MIN: Performed by: INTERNAL MEDICINE

## 2024-04-19 PROCEDURE — 3700000000 HC ANESTHESIA ATTENDED CARE: Performed by: INTERNAL MEDICINE

## 2024-04-19 PROCEDURE — 2709999900 HC NON-CHARGEABLE SUPPLY: Performed by: INTERNAL MEDICINE

## 2024-04-19 PROCEDURE — 6360000002 HC RX W HCPCS: Performed by: NURSE ANESTHETIST, CERTIFIED REGISTERED

## 2024-04-19 PROCEDURE — 2580000003 HC RX 258: Performed by: NURSE ANESTHETIST, CERTIFIED REGISTERED

## 2024-04-19 PROCEDURE — 7100000010 HC PHASE II RECOVERY - FIRST 15 MIN: Performed by: INTERNAL MEDICINE

## 2024-04-19 PROCEDURE — 2500000003 HC RX 250 WO HCPCS: Performed by: NURSE ANESTHETIST, CERTIFIED REGISTERED

## 2024-04-19 RX ORDER — PROPOFOL 10 MG/ML
INJECTION, EMULSION INTRAVENOUS PRN
Status: DISCONTINUED | OUTPATIENT
Start: 2024-04-19 | End: 2024-04-19 | Stop reason: SDUPTHER

## 2024-04-19 RX ORDER — SODIUM CHLORIDE, SODIUM LACTATE, POTASSIUM CHLORIDE, CALCIUM CHLORIDE 600; 310; 30; 20 MG/100ML; MG/100ML; MG/100ML; MG/100ML
INJECTION, SOLUTION INTRAVENOUS CONTINUOUS PRN
Status: DISCONTINUED | OUTPATIENT
Start: 2024-04-19 | End: 2024-04-19 | Stop reason: SDUPTHER

## 2024-04-19 RX ORDER — SODIUM CHLORIDE 9 MG/ML
INJECTION, SOLUTION INTRAVENOUS PRN
Status: DISCONTINUED | OUTPATIENT
Start: 2024-04-19 | End: 2024-04-19 | Stop reason: HOSPADM

## 2024-04-19 RX ORDER — LIDOCAINE HYDROCHLORIDE 10 MG/ML
1 INJECTION, SOLUTION EPIDURAL; INFILTRATION; INTRACAUDAL; PERINEURAL
Status: DISCONTINUED | OUTPATIENT
Start: 2024-04-19 | End: 2024-04-19 | Stop reason: HOSPADM

## 2024-04-19 RX ORDER — SODIUM CHLORIDE 9 MG/ML
INJECTION, SOLUTION INTRAVENOUS CONTINUOUS
Status: DISCONTINUED | OUTPATIENT
Start: 2024-04-19 | End: 2024-04-19 | Stop reason: HOSPADM

## 2024-04-19 RX ORDER — KETOROLAC TROMETHAMINE 30 MG/ML
30 INJECTION, SOLUTION INTRAMUSCULAR; INTRAVENOUS ONCE
Status: DISCONTINUED | OUTPATIENT
Start: 2024-04-19 | End: 2024-04-19 | Stop reason: HOSPADM

## 2024-04-19 RX ORDER — LIDOCAINE HYDROCHLORIDE 20 MG/ML
INJECTION, SOLUTION INFILTRATION; PERINEURAL PRN
Status: DISCONTINUED | OUTPATIENT
Start: 2024-04-19 | End: 2024-04-19 | Stop reason: SDUPTHER

## 2024-04-19 RX ORDER — SODIUM CHLORIDE, SODIUM LACTATE, POTASSIUM CHLORIDE, CALCIUM CHLORIDE 600; 310; 30; 20 MG/100ML; MG/100ML; MG/100ML; MG/100ML
INJECTION, SOLUTION INTRAVENOUS CONTINUOUS
Status: DISCONTINUED | OUTPATIENT
Start: 2024-04-19 | End: 2024-04-19 | Stop reason: HOSPADM

## 2024-04-19 RX ORDER — SODIUM CHLORIDE 0.9 % (FLUSH) 0.9 %
5-40 SYRINGE (ML) INJECTION EVERY 12 HOURS SCHEDULED
Status: DISCONTINUED | OUTPATIENT
Start: 2024-04-19 | End: 2024-04-19 | Stop reason: HOSPADM

## 2024-04-19 RX ORDER — SODIUM CHLORIDE 0.9 % (FLUSH) 0.9 %
5-40 SYRINGE (ML) INJECTION PRN
Status: DISCONTINUED | OUTPATIENT
Start: 2024-04-19 | End: 2024-04-19 | Stop reason: HOSPADM

## 2024-04-19 RX ADMIN — LIDOCAINE HYDROCHLORIDE 50 MG: 20 INJECTION, SOLUTION INFILTRATION; PERINEURAL at 11:14

## 2024-04-19 RX ADMIN — PROPOFOL 80 MG: 10 INJECTION, EMULSION INTRAVENOUS at 11:19

## 2024-04-19 RX ADMIN — PROPOFOL 70 MG: 10 INJECTION, EMULSION INTRAVENOUS at 11:25

## 2024-04-19 RX ADMIN — SODIUM CHLORIDE, POTASSIUM CHLORIDE, SODIUM LACTATE AND CALCIUM CHLORIDE: 600; 310; 30; 20 INJECTION, SOLUTION INTRAVENOUS at 10:17

## 2024-04-19 RX ADMIN — PROPOFOL 80 MG: 10 INJECTION, EMULSION INTRAVENOUS at 11:14

## 2024-04-19 RX ADMIN — SODIUM CHLORIDE, POTASSIUM CHLORIDE, SODIUM LACTATE AND CALCIUM CHLORIDE: 600; 310; 30; 20 INJECTION, SOLUTION INTRAVENOUS at 11:10

## 2024-04-19 ASSESSMENT — PAIN DESCRIPTION - DESCRIPTORS
DESCRIPTORS: ACHING;SHARP;BURNING
DESCRIPTORS: PATIENT UNABLE TO DESCRIBE

## 2024-04-19 ASSESSMENT — PAIN SCALES - GENERAL
PAINLEVEL_OUTOF10: 8
PAINLEVEL_OUTOF10: 8

## 2024-04-19 ASSESSMENT — PAIN - FUNCTIONAL ASSESSMENT
PAIN_FUNCTIONAL_ASSESSMENT: 0-10
PAIN_FUNCTIONAL_ASSESSMENT: PREVENTS OR INTERFERES SOME ACTIVE ACTIVITIES AND ADLS
PAIN_FUNCTIONAL_ASSESSMENT: 0-10

## 2024-04-19 ASSESSMENT — PAIN DESCRIPTION - PAIN TYPE: TYPE: CHRONIC PAIN

## 2024-04-19 NOTE — DISCHARGE INSTRUCTIONS
anytime you think you may need emergency care. For example, call if:  You passed out (lost consciousness).  You pass maroon or bloody stools.  You have severe belly pain.  Call your doctor now or seek immediate medical care if:  Your stools are black and tarlike.  Your stools have streaks of blood, but you did not have a biopsy or any polyps removed.  You have belly pain, or your belly is swollen and firm.  You vomit.  You have a fever.  You are very dizzy.  Watch closely for changes in your health, and be sure to contact your doctor if you have any problems.   Where can you learn more?   Go to [email or web address] and sign in to your [employer] account. Enter E264 in the Search Health Information box to learn more about “Colonoscopy: What to Expect at Home.”    If you do not have an account, please click on the “Sign Up Now” link.     © 6963-0963 [employer]. Care instructions adapted under license by [employer]. This care instruction is for use with your licensed healthcare professional. If you have questions about a medical condition or this instruction, always ask your healthcare professional. [employer] disclaims any warranty or liability for your use of this information.  Content Version: 9.9.668023; Last Revised: February 20, 2013

## 2024-04-19 NOTE — H&P
GI History and Physical    Pt Name: Eve Vázquez  MRN: [id number]  YOB: 1975  Date of evaluation: 4/19/2024  Primary Care Physician: Renetta Og APRN - CNP    SUBJECTIVE:   History of Chief Complaint:    This is Eve Vázquez a 48 y.o. female who presents today for a COLONOSCOPY  by Dr BA  for COLORECTAL CANCER SCREENING..   The patient completed the prep as directed Yes. Bowel movements have not significantly changed The patient does not have a family history of colon cancer or polyps. Previous colonoscopy No.  .    Patient today denies  fever, chills, night sweats, pain or unexplained weight loss. SHE NO LONGER TAKES BLOOD THINNERS,SHE DOES NOT HAVE DIABETES,    Past Medical History    has a past medical history of Acute deep vein thrombosis (DVT) of right lower extremity (HCC), Acute internal jugular vein embolism (HCC), Asthma, Bronchitis, and DVT (deep venous thrombosis) (HCC).  Past Surgical History   has a past surgical history that includes Appendectomy; Tonsillectomy; and Hysterectomy.  Medications  Prior to Admission medications    Medication Sig Start Date End Date Taking? Authorizing Provider   amphetamine-dextroamphetamine (ADDERALL XR) 20 MG extended release capsule Take 1 capsule by mouth daily for 90 days. 4/16/24 7/15/24  Renetta Og APRN - CNP   amphetamine-dextroamphetamine (ADDERALL, 20MG,) 20 MG tablet Take 1 tablet by mouth daily for 30 days. 4/16/24 5/16/24  Renetta Og APRN - CNP   Elastic Bandages & Supports (LUMBAR BACK BRACE/SUPPORT PAD) MISC 1 Units by Does not apply route daily PNEUMATIC OFFLOADING BACK BRACE 3/19/24 3/19/25  William Del Cid MD   tiotropium (SPIRIVA HANDIHALER) 18 MCG inhalation capsule INHALE THE CONTENTS OF 1 CAPSULE USING THE HANDIHALER INTO THE LUNGS DAILY 2/28/24   Renetta Og APRN - CNP   albuterol sulfate HFA (PROVENTIL;VENTOLIN;PROAIR) 108 (90 Base) MCG/ACT inhaler INHALE 1 TO 2 PUFFS BY MOUTH EVERY 4 HOURS AS NEEDED FOR

## 2024-04-19 NOTE — ANESTHESIA POSTPROCEDURE EVALUATION
Department of Anesthesiology  Postprocedure Note    Patient: Eve Vázquez  MRN: [id number]  YOB: 1975  Date of evaluation: 4/19/2024    Procedure Summary       Date: 04/19/24 Room / Location: [address]    Anesthesia Start: 1110 Anesthesia Stop: 1138    Procedure: COLORECTAL CANCER SCREENING, NOT HIGH RISK (Rectum) Diagnosis:       Screen for colon cancer      (Screen for colon cancer [Z12.11])    Surgeons: Candido Soares MD Responsible Provider: Ventura Winston DO    Anesthesia Type: MAC, general ASA Status: 3            Anesthesia Type: No value filed.    Silvina Phase I: Silvina Score: 10    Silvina Phase II: Silvina Score: 10    Anesthesia Post Evaluation    Patient location during evaluation: PACU  Patient participation: complete - patient participated  Level of consciousness: awake and alert  Airway patency: patent  Nausea & Vomiting: no nausea and no vomiting  Cardiovascular status: hemodynamically stable  Respiratory status: acceptable  Hydration status: stable  Pain management: adequate    No notable events documented.

## 2024-04-19 NOTE — ANESTHESIA PRE PROCEDURE
Department of Anesthesiology  Preprocedure Note       Name:  Eve Vázquez   Age:  48 y.o.  :  1975                                          MRN:  [id number]         Date:  2024      Surgeon: Surgeon(s):  Candido Soares MD    Procedure: Procedure(s):  COLORECTAL CANCER SCREENING, NOT HIGH RISK    Medications prior to admission:   Prior to Admission medications    Medication Sig Start Date End Date Taking? Authorizing Provider   amphetamine-dextroamphetamine (ADDERALL XR) 20 MG extended release capsule Take 1 capsule by mouth daily for 90 days. 4/16/24 7/15/24  Renetta Og APRN - CNP   amphetamine-dextroamphetamine (ADDERALL, 20MG,) 20 MG tablet Take 1 tablet by mouth daily for 30 days. 24  Renetta Og APRN - CNP   Elastic Bandages & Supports (LUMBAR BACK BRACE/SUPPORT PAD) MISC 1 Units by Does not apply route daily PNEUMATIC OFFLOADING BACK BRACE 3/19/24 3/19/25  William Del Cid MD   tiotropium (SPIRIVA HANDIHALER) 18 MCG inhalation capsule INHALE THE CONTENTS OF 1 CAPSULE USING THE HANDIHALER INTO THE LUNGS DAILY 24   Renetta Og APRN - CNP   albuterol sulfate HFA (PROVENTIL;VENTOLIN;PROAIR) 108 (90 Base) MCG/ACT inhaler INHALE 1 TO 2 PUFFS BY MOUTH EVERY 4 HOURS AS NEEDED FOR WHEEZING 23   Renetta Og APRN - CNP   sennosides-docusate sodium (SENOKOT-S) 8.6-50 MG tablet Take 1-2 tablets daily until desired bm, then 1 daily 23   Renetta Og APRN - CNP   ibuprofen (ADVIL;MOTRIN) 800 MG tablet Take 1 tablet by mouth every 8 hours as needed for Pain 10/5/23 2/28/24  Cecily Lane APRN - CNP   budesonide-formoterol (SYMBICORT) 160-4.5 MCG/ACT AERO INHALE 2 PUFFS INTO THE LUNGS TWICE DAILY 23   Renetta Og APRN - CNP   omeprazole (PRILOSEC) 40 MG delayed release capsule TAKE 1 CAPSULE BY MOUTH EVERY MORNING BEFORE BREAKFAST  Patient not taking: Reported on 2024 10/18/22   Renetta Og, APRN - CNP   fexofenadine (ALLEGRA) 180

## 2024-04-19 NOTE — OP NOTE
normal    Descending/Sigmoid colon: normal    Rectum/Anus: examined in normal and retroflexed positions and was abnormal: Hemorrhoids    The prep was very poor a small polyp could be heading under the stool for which we need to repeat the scope in the next 6 months with better prep      Withdrawal Time was (minutes): 8    The colon was decompressed and the scope was removed.  The patient tolerated the procedure well.     Recommendations/Plan:   Lifestyle and dietary modifications as discussed  F/U Biopsies  F/U In OfficeYes  Discussed with the family  Repeat colonoscopy in3-6 months with 2 day prep    Electronically signed by Candido Soares MD  on 4/19/2024 at 11:34 AM

## 2024-04-29 ENCOUNTER — TELEPHONE (OUTPATIENT)
Dept: GASTROENTEROLOGY | Age: 49
End: 2024-04-29

## 2024-04-29 NOTE — TELEPHONE ENCOUNTER
Per provider, patient to repeat colonoscopy with 2 day bowel prep  Writer yasminm for patient to return call to schedule.

## 2024-05-08 ENCOUNTER — HOSPITAL ENCOUNTER (OUTPATIENT)
Dept: PAIN MANAGEMENT | Facility: CLINIC | Age: 49
Discharge: HOME | End: 2024-05-08
Payer: COMMERCIAL

## 2024-05-08 VITALS
TEMPERATURE: 98.1 F | SYSTOLIC BLOOD PRESSURE: 148 MMHG | WEIGHT: 200 LBS | DIASTOLIC BLOOD PRESSURE: 107 MMHG | BODY MASS INDEX: 33.32 KG/M2 | HEART RATE: 78 BPM | HEIGHT: 65 IN | OXYGEN SATURATION: 94 % | RESPIRATION RATE: 13 BRPM

## 2024-05-08 DIAGNOSIS — M54.16 CHRONIC LUMBAR RADICULOPATHY: ICD-10-CM

## 2024-05-08 DIAGNOSIS — R52 PAIN MANAGEMENT: ICD-10-CM

## 2024-05-08 DIAGNOSIS — M96.1 LUMBAR POSTLAMINECTOMY SYNDROME: Primary | ICD-10-CM

## 2024-05-08 PROCEDURE — 2580000003 HC RX 258: Performed by: ANESTHESIOLOGY

## 2024-05-08 PROCEDURE — 2500000003 HC RX 250 WO HCPCS: Performed by: ANESTHESIOLOGY

## 2024-05-08 PROCEDURE — 64483 NJX AA&/STRD TFRM EPI L/S 1: CPT

## 2024-05-08 PROCEDURE — 99152 MOD SED SAME PHYS/QHP 5/>YRS: CPT | Performed by: ANESTHESIOLOGY

## 2024-05-08 PROCEDURE — 64483 NJX AA&/STRD TFRM EPI L/S 1: CPT | Performed by: ANESTHESIOLOGY

## 2024-05-08 PROCEDURE — 6360000002 HC RX W HCPCS: Performed by: ANESTHESIOLOGY

## 2024-05-08 PROCEDURE — 64484 NJX AA&/STRD TFRM EPI L/S EA: CPT | Performed by: ANESTHESIOLOGY

## 2024-05-08 PROCEDURE — 64484 NJX AA&/STRD TFRM EPI L/S EA: CPT

## 2024-05-08 PROCEDURE — 6360000004 HC RX CONTRAST MEDICATION: Performed by: ANESTHESIOLOGY

## 2024-05-08 RX ORDER — FENTANYL CITRATE 50 UG/ML
INJECTION, SOLUTION INTRAMUSCULAR; INTRAVENOUS
Status: COMPLETED | OUTPATIENT
Start: 2024-05-08 | End: 2024-05-08

## 2024-05-08 RX ORDER — LIDOCAINE HYDROCHLORIDE 10 MG/ML
INJECTION, SOLUTION EPIDURAL; INFILTRATION; INTRACAUDAL; PERINEURAL
Status: COMPLETED | OUTPATIENT
Start: 2024-05-08 | End: 2024-05-08

## 2024-05-08 RX ORDER — MIDAZOLAM HYDROCHLORIDE 2 MG/2ML
INJECTION, SOLUTION INTRAMUSCULAR; INTRAVENOUS
Status: COMPLETED | OUTPATIENT
Start: 2024-05-08 | End: 2024-05-08

## 2024-05-08 RX ORDER — DEXAMETHASONE SODIUM PHOSPHATE 10 MG/ML
INJECTION, SOLUTION INTRAMUSCULAR; INTRAVENOUS
Status: COMPLETED | OUTPATIENT
Start: 2024-05-08 | End: 2024-05-08

## 2024-05-08 RX ORDER — SODIUM CHLORIDE 0.9 % (FLUSH) 0.9 %
SYRINGE (ML) INJECTION
Status: COMPLETED | OUTPATIENT
Start: 2024-05-08 | End: 2024-05-08

## 2024-05-08 RX ADMIN — FENTANYL CITRATE 50 MCG: 50 INJECTION, SOLUTION INTRAMUSCULAR; INTRAVENOUS at 07:39

## 2024-05-08 RX ADMIN — DEXAMETHASONE SODIUM PHOSPHATE 10 MG: 10 INJECTION, SOLUTION INTRAMUSCULAR; INTRAVENOUS at 07:43

## 2024-05-08 RX ADMIN — IOHEXOL 2 ML: 180 INJECTION INTRAVENOUS at 07:42

## 2024-05-08 RX ADMIN — Medication 5 ML: at 07:43

## 2024-05-08 RX ADMIN — LIDOCAINE HYDROCHLORIDE 5 ML: 10 INJECTION, SOLUTION EPIDURAL; INFILTRATION; INTRACAUDAL at 07:39

## 2024-05-08 RX ADMIN — MIDAZOLAM HYDROCHLORIDE 2 MG: 1 INJECTION, SOLUTION INTRAMUSCULAR; INTRAVENOUS at 07:39

## 2024-05-08 ASSESSMENT — PAIN - FUNCTIONAL ASSESSMENT
PAIN_FUNCTIONAL_ASSESSMENT: 0-10
PAIN_FUNCTIONAL_ASSESSMENT: PREVENTS OR INTERFERES WITH MANY ACTIVE NOT PASSIVE ACTIVITIES

## 2024-05-08 ASSESSMENT — PAIN SCALES - GENERAL: PAINLEVEL_OUTOF10: 3

## 2024-05-08 ASSESSMENT — PAIN DESCRIPTION - DESCRIPTORS: DESCRIPTORS: SHARP;POUNDING

## 2024-05-08 NOTE — H&P
[Pneumococcal 13-Andria Conj Vacc] Swelling         Current Outpatient Medications:     bisacodyl 5 MG EC tablet, Take as directed for bowel prep/colonoscopy (Patient not taking: Reported on 5/8/2024), Disp: 4 tablet, Rfl: 0    polyethylene glycol (MIRALAX) 17 GM/SCOOP powder, Take per bowel prep instructions (Patient not taking: Reported on 5/8/2024), Disp: 238 g, Rfl: 0    amphetamine-dextroamphetamine (ADDERALL XR) 20 MG extended release capsule, Take 1 capsule by mouth daily for 90 days., Disp: 90 capsule, Rfl: 0    amphetamine-dextroamphetamine (ADDERALL, 20MG,) 20 MG tablet, Take 1 tablet by mouth daily for 30 days., Disp: 30 tablet, Rfl: 0    Elastic Bandages & Supports (LUMBAR BACK BRACE/SUPPORT PAD) MISC, 1 Units by Does not apply route daily PNEUMATIC OFFLOADING BACK BRACE, Disp: 1 each, Rfl: 0    tiotropium (SPIRIVA HANDIHALER) 18 MCG inhalation capsule, INHALE THE CONTENTS OF 1 CAPSULE USING THE HANDIHALER INTO THE LUNGS DAILY, Disp: 90 capsule, Rfl: 2    albuterol sulfate HFA (PROVENTIL;VENTOLIN;PROAIR) 108 (90 Base) MCG/ACT inhaler, INHALE 1 TO 2 PUFFS BY MOUTH EVERY 4 HOURS AS NEEDED FOR WHEEZING, Disp: 8.5 g, Rfl: 5    sennosides-docusate sodium (SENOKOT-S) 8.6-50 MG tablet, Take 1-2 tablets daily until desired bm, then 1 daily, Disp: 90 tablet, Rfl: 0    ibuprofen (ADVIL;MOTRIN) 800 MG tablet, Take 1 tablet by mouth every 8 hours as needed for Pain, Disp: 180 tablet, Rfl: 1    budesonide-formoterol (SYMBICORT) 160-4.5 MCG/ACT AERO, INHALE 2 PUFFS INTO THE LUNGS TWICE DAILY, Disp: 30.6 g, Rfl: 3    omeprazole (PRILOSEC) 40 MG delayed release capsule, TAKE 1 CAPSULE BY MOUTH EVERY MORNING BEFORE BREAKFAST (Patient not taking: Reported on 4/19/2024), Disp: 90 capsule, Rfl: 1    fexofenadine (ALLEGRA) 180 MG tablet, Take 1 tablet by mouth daily, Disp: 30 tablet, Rfl: 5    azelastine (ASTELIN) 0.1 % nasal spray, 2 sprays by Nasal route 2 times daily Use in each nostril as directed, Disp: 120 mL, Rfl: 1

## 2024-05-08 NOTE — DISCHARGE INSTRUCTIONS

## 2024-05-08 NOTE — OP NOTE
Patient Name: Eve Vázquez   YOB: 1975  Room/Bed: Room/bed info not found  Medical Record Number: [id number]  Date: 5/8/2024       Preoperative Diagnosis:    1. Lumbar postlaminectomy syndrome    2. Chronic lumbar radiculopathy          Postoperative diagnosis:   1. Lumbar postlaminectomy syndrome    2. Chronic lumbar radiculopathy          Blood Loss: none    Procedure Performed:  Transforaminal lumbar epidural steroid injection at the levels of L4 AND L5 on the Left side under fluoroscopic guidance.    Procedure:   The Patient was seen in the preop area, chart was reviewed, informed consent was obtained. Patient was taken to procedure room and was placed in prone position. Vital signs were monitored through out the  Procedure. A time out was completed.  The skin over the back was prepped and draped in sterile manner.     The target point was marked in ipsilateral obliques just below the pedicle at the target levels. Skin and deep tissues were anesthetized with 1 % lidocaine.A 20-gauge, spinal needle was advanced  under fluoroscopy guidance in AP / Oblique and lateral views, such that the tip of the needle lies in the neuroforamina at about the 6 o'clock position under the pedicle of the target vertebra.  This was confirmed with AP and lateral views of the fluoroscopy.     Then after negative aspiration contrast dye Omnipaque-180 was injected with live fluoroscopy in AP views that showed  spread of the contrast in the epidural space  and no vascular runoff or intrathecal spread. Finally 3 ml of treatment solution containing 5 ml of  PF NS mixed with 1 ml of dexamethasone 10 mg / ml was injected.  The needle was removed and a Band-Aid was placed over the needle  insertion site.  The patient's vital signs remained stable and the patient tolerated the procedure well.        The same procedure was then repeated at left at L 5level with same technique. The remaining 3 ml of treatment solution was injected

## 2024-05-18 ENCOUNTER — HOSPITAL ENCOUNTER (OUTPATIENT)
Dept: MAMMOGRAPHY | Age: 49
End: 2024-05-18
Payer: COMMERCIAL

## 2024-05-18 VITALS — BODY MASS INDEX: 33.32 KG/M2 | WEIGHT: 200 LBS | HEIGHT: 65 IN

## 2024-05-18 DIAGNOSIS — Z12.31 BREAST CANCER SCREENING BY MAMMOGRAM: ICD-10-CM

## 2024-05-18 PROCEDURE — 77063 BREAST TOMOSYNTHESIS BI: CPT

## 2024-05-21 DIAGNOSIS — F90.2 ATTENTION DEFICIT HYPERACTIVITY DISORDER (ADHD), COMBINED TYPE: ICD-10-CM

## 2024-05-21 DIAGNOSIS — M51.36 DEGENERATIVE DISC DISEASE, LUMBAR: ICD-10-CM

## 2024-05-21 RX ORDER — DEXTROAMPHETAMINE SACCHARATE, AMPHETAMINE ASPARTATE, DEXTROAMPHETAMINE SULFATE AND AMPHETAMINE SULFATE 5; 5; 5; 5 MG/1; MG/1; MG/1; MG/1
20 TABLET ORAL DAILY
Qty: 30 TABLET | Refills: 0 | Status: SHIPPED | OUTPATIENT
Start: 2024-05-21 | End: 2024-06-20

## 2024-05-21 RX ORDER — IBUPROFEN 800 MG/1
800 TABLET ORAL EVERY 8 HOURS PRN
Qty: 180 TABLET | Refills: 1 | Status: SHIPPED | OUTPATIENT
Start: 2024-05-21 | End: 2024-08-07

## 2024-05-21 RX ORDER — DEXTROAMPHETAMINE SACCHARATE, AMPHETAMINE ASPARTATE MONOHYDRATE, DEXTROAMPHETAMINE SULFATE AND AMPHETAMINE SULFATE 5; 5; 5; 5 MG/1; MG/1; MG/1; MG/1
20 CAPSULE, EXTENDED RELEASE ORAL DAILY
Qty: 90 CAPSULE | Refills: 0 | Status: SHIPPED | OUTPATIENT
Start: 2024-05-21 | End: 2024-08-19

## 2024-06-03 ENCOUNTER — OFFICE VISIT (OUTPATIENT)
Dept: PRIMARY CARE CLINIC | Age: 49
End: 2024-06-03
Payer: COMMERCIAL

## 2024-06-03 VITALS
HEART RATE: 78 BPM | OXYGEN SATURATION: 98 % | WEIGHT: 196.4 LBS | RESPIRATION RATE: 16 BRPM | SYSTOLIC BLOOD PRESSURE: 138 MMHG | BODY MASS INDEX: 32.68 KG/M2 | DIASTOLIC BLOOD PRESSURE: 90 MMHG

## 2024-06-03 DIAGNOSIS — R59.1 LYMPHADENOPATHY: ICD-10-CM

## 2024-06-03 DIAGNOSIS — F90.2 ATTENTION DEFICIT HYPERACTIVITY DISORDER (ADHD), COMBINED TYPE: Primary | ICD-10-CM

## 2024-06-03 DIAGNOSIS — R03.0 ELEVATED BLOOD PRESSURE READING: ICD-10-CM

## 2024-06-03 DIAGNOSIS — M51.26 DISPLACEMENT OF LUMBAR INTERVERTEBRAL DISC WITHOUT MYELOPATHY: ICD-10-CM

## 2024-06-03 DIAGNOSIS — J41.8 MIXED SIMPLE AND MUCOPURULENT CHRONIC BRONCHITIS (HCC): ICD-10-CM

## 2024-06-03 DIAGNOSIS — K59.04 CHRONIC IDIOPATHIC CONSTIPATION: ICD-10-CM

## 2024-06-03 DIAGNOSIS — M47.26 OSTEOARTHRITIS OF SPINE WITH RADICULOPATHY, LUMBAR REGION: ICD-10-CM

## 2024-06-03 PROCEDURE — 99215 OFFICE O/P EST HI 40 MIN: CPT | Performed by: NURSE PRACTITIONER

## 2024-06-03 RX ORDER — PREDNISONE 10 MG/1
TABLET ORAL
Qty: 20 TABLET | Refills: 0 | Status: SHIPPED | OUTPATIENT
Start: 2024-06-03 | End: 2024-06-13

## 2024-06-03 RX ORDER — OXYCODONE HYDROCHLORIDE AND ACETAMINOPHEN 5; 325 MG/1; MG/1
1 TABLET ORAL NIGHTLY PRN
Qty: 30 TABLET | Refills: 0 | Status: SHIPPED | OUTPATIENT
Start: 2024-06-03 | End: 2024-07-03

## 2024-06-03 SDOH — ECONOMIC STABILITY: FOOD INSECURITY: WITHIN THE PAST 12 MONTHS, YOU WORRIED THAT YOUR FOOD WOULD RUN OUT BEFORE YOU GOT MONEY TO BUY MORE.: NEVER TRUE

## 2024-06-03 SDOH — ECONOMIC STABILITY: INCOME INSECURITY: HOW HARD IS IT FOR YOU TO PAY FOR THE VERY BASICS LIKE FOOD, HOUSING, MEDICAL CARE, AND HEATING?: NOT HARD AT ALL

## 2024-06-03 SDOH — ECONOMIC STABILITY: FOOD INSECURITY: WITHIN THE PAST 12 MONTHS, THE FOOD YOU BOUGHT JUST DIDN'T LAST AND YOU DIDN'T HAVE MONEY TO GET MORE.: NEVER TRUE

## 2024-06-03 ASSESSMENT — ENCOUNTER SYMPTOMS
CONSTIPATION: 1
COUGH: 0
SINUS PRESSURE: 0
NAUSEA: 0
CHEST TIGHTNESS: 0
EYE ITCHING: 0
DIARRHEA: 0
EYE DISCHARGE: 0
SINUS PAIN: 0
SHORTNESS OF BREATH: 0
ROS SKIN COMMENTS: LUMP ON BACK OF NECK
VOMITING: 0
ABDOMINAL PAIN: 0
EYE REDNESS: 0
SORE THROAT: 0
WHEEZING: 0
TROUBLE SWALLOWING: 0

## 2024-06-03 ASSESSMENT — PATIENT HEALTH QUESTIONNAIRE - PHQ9
SUM OF ALL RESPONSES TO PHQ QUESTIONS 1-9: 0
SUM OF ALL RESPONSES TO PHQ QUESTIONS 1-9: 0
1. LITTLE INTEREST OR PLEASURE IN DOING THINGS: NOT AT ALL
2. FEELING DOWN, DEPRESSED OR HOPELESS: NOT AT ALL
SUM OF ALL RESPONSES TO PHQ9 QUESTIONS 1 & 2: 0
SUM OF ALL RESPONSES TO PHQ QUESTIONS 1-9: 0
SUM OF ALL RESPONSES TO PHQ QUESTIONS 1-9: 0

## 2024-06-03 NOTE — PROGRESS NOTES
[facility]  [facility] PR[facility]  [address] [place] [address] [address]  [address]  [facility] [address]  Dept: [phone]  Dept Fax: [phone]    Eve Vázquez is a 48 y.o. female who presents today for her medical conditions/complaintsas noted below.  Eve Vázquez is c/o of ADHD (Med check) and Mass (Bump on back on neck, noticed for 3 days)        HPI:     Patient presents for a 3-month follow-up  Blood pressure elevated  Weight is stable    Patient presents for a 3-month medication follow-up.  She continues to do well on her Adderall.  She does continue to have back pain.  She does need a refill on her Percocet.  She only takes 1 a day.  She is now following with pain management.  They did do an injection of her back.  She did tolerate the procedure well however is no change to her back pain.  She does have a follow-up with them on June 7    She also complains of constipation.  She only has a bowel movement about once a week.  She recently did a prep for colonoscopy and did not have any bowel movement with the prep.  She did notify the specialist doing the procedure however they still try to proceed with the colonoscopy and they were unable to do it due to poor bowel prep.  She is supposed to reschedule but has canceled as she has to wait to see if her insurance will cover it.  She has been taking a stool softener but is still unable to have a bowel movement    C/o bump on back of neck 3 days ago. No pain but it does radiate up.  She has never noticed this before          Past Medical History:   Diagnosis Date    Acute deep vein thrombosis (DVT) of right lower extremity (HCC) 02/16/2017    Acute internal jugular vein embolism (HCC) 02/16/2017    Correspondence dated February 21 2017 from  reviewed on 2/22/2017, see Media tab of Chart Review for more information.  Correspondence dated July 19, 2017 from Dr. Crisostomo reviewed on 7/20/17, see Media tab of Chart Review for more information.

## 2024-06-07 ENCOUNTER — OFFICE VISIT (OUTPATIENT)
Dept: PAIN MANAGEMENT | Age: 49
End: 2024-06-07
Payer: COMMERCIAL

## 2024-06-07 VITALS — BODY MASS INDEX: 32.65 KG/M2 | HEIGHT: 65 IN | WEIGHT: 196 LBS

## 2024-06-07 DIAGNOSIS — M54.16 CHRONIC LUMBAR RADICULOPATHY: Primary | ICD-10-CM

## 2024-06-07 DIAGNOSIS — M96.1 LUMBAR POSTLAMINECTOMY SYNDROME: ICD-10-CM

## 2024-06-07 DIAGNOSIS — M47.26 OSTEOARTHRITIS OF SPINE WITH RADICULOPATHY, LUMBAR REGION: ICD-10-CM

## 2024-06-07 PROCEDURE — 99213 OFFICE O/P EST LOW 20 MIN: CPT | Performed by: NURSE PRACTITIONER

## 2024-06-07 ASSESSMENT — ENCOUNTER SYMPTOMS
BLURRED VISION: 0
CONSTIPATION: 0
BACK PAIN: 1
BOWEL INCONTINENCE: 0
COUGH: 0
SHORTNESS OF BREATH: 0

## 2024-06-07 NOTE — PROGRESS NOTES
needed for Pain, Disp: 180 tablet, Rfl: 1    tiotropium (SPIRIVA HANDIHALER) 18 MCG inhalation capsule, INHALE THE CONTENTS OF 1 CAPSULE USING THE HANDIHALER INTO THE LUNGS DAILY, Disp: 90 capsule, Rfl: 2    albuterol sulfate HFA (PROVENTIL;VENTOLIN;PROAIR) 108 (90 Base) MCG/ACT inhaler, INHALE 1 TO 2 PUFFS BY MOUTH EVERY 4 HOURS AS NEEDED FOR WHEEZING, Disp: 8.5 g, Rfl: 5    sennosides-docusate sodium (SENOKOT-S) 8.6-50 MG tablet, Take 1-2 tablets daily until desired bm, then 1 daily, Disp: 90 tablet, Rfl: 0    budesonide-formoterol (SYMBICORT) 160-4.5 MCG/ACT AERO, INHALE 2 PUFFS INTO THE LUNGS TWICE DAILY, Disp: 30.6 g, Rfl: 3    fexofenadine (ALLEGRA) 180 MG tablet, Take 1 tablet by mouth daily, Disp: 30 tablet, Rfl: 5    azelastine (ASTELIN) 0.1 % nasal spray, 2 sprays by Nasal route 2 times daily Use in each nostril as directed, Disp: 120 mL, Rfl: 1    Calcium Carbonate+Vitamin D 600-200 MG-UNIT TABS, Take 1 tablet by mouth daily, Disp: 90 tablet, Rfl: 1    Multiple Vitamins-Minerals (CENTRUM SILVER PO), Take by mouth, Disp: , Rfl:     aspirin 325 MG tablet, Take 1 tablet by mouth, Disp: , Rfl:     Family History   Problem Relation Age of Onset    Breast Cancer Paternal Aunt 71       Social History     Socioeconomic History    Marital status:      Spouse name: Not on file    Number of children: Not on file    Years of education: Not on file    Highest education level: Not on file   Occupational History    Not on file   Tobacco Use    Smoking status: Every Day     Current packs/day: 1.00     Average packs/day: 1 pack/day for 30.0 years (30.0 ttl pk-yrs)     Types: Cigarettes    Smokeless tobacco: Never    Tobacco comments:     smokes 1 cigarette in morning, on Chantix   Vaping Use    Vaping Use: Never used   Substance and Sexual Activity    Alcohol use: Yes     Alcohol/week: 2.0 standard drinks of alcohol     Types: 2 Cans of beer per week     Comment: Nightly    Drug use: No    Sexual activity: Not on

## 2024-06-26 DIAGNOSIS — F90.2 ATTENTION DEFICIT HYPERACTIVITY DISORDER (ADHD), COMBINED TYPE: ICD-10-CM

## 2024-06-26 DIAGNOSIS — J41.8 MIXED SIMPLE AND MUCOPURULENT CHRONIC BRONCHITIS (HCC): ICD-10-CM

## 2024-06-26 DIAGNOSIS — M47.26 OSTEOARTHRITIS OF SPINE WITH RADICULOPATHY, LUMBAR REGION: ICD-10-CM

## 2024-06-26 DIAGNOSIS — M51.26 DISPLACEMENT OF LUMBAR INTERVERTEBRAL DISC WITHOUT MYELOPATHY: ICD-10-CM

## 2024-06-26 RX ORDER — OXYCODONE HYDROCHLORIDE AND ACETAMINOPHEN 5; 325 MG/1; MG/1
1 TABLET ORAL NIGHTLY PRN
Qty: 30 TABLET | Refills: 0 | OUTPATIENT
Start: 2024-06-26 | End: 2024-07-26

## 2024-06-26 RX ORDER — DEXTROAMPHETAMINE SACCHARATE, AMPHETAMINE ASPARTATE MONOHYDRATE, DEXTROAMPHETAMINE SULFATE AND AMPHETAMINE SULFATE 5; 5; 5; 5 MG/1; MG/1; MG/1; MG/1
20 CAPSULE, EXTENDED RELEASE ORAL DAILY
Qty: 90 CAPSULE | Refills: 0 | Status: SHIPPED | OUTPATIENT
Start: 2024-06-26 | End: 2024-09-24

## 2024-06-26 RX ORDER — DEXTROAMPHETAMINE SACCHARATE, AMPHETAMINE ASPARTATE, DEXTROAMPHETAMINE SULFATE AND AMPHETAMINE SULFATE 5; 5; 5; 5 MG/1; MG/1; MG/1; MG/1
20 TABLET ORAL DAILY
Qty: 30 TABLET | Refills: 0 | Status: SHIPPED | OUTPATIENT
Start: 2024-06-26 | End: 2024-07-26

## 2024-06-26 RX ORDER — ALBUTEROL SULFATE 90 UG/1
AEROSOL, METERED RESPIRATORY (INHALATION)
Qty: 8.5 G | Refills: 5 | Status: SHIPPED | OUTPATIENT
Start: 2024-06-26

## 2024-07-29 DIAGNOSIS — F90.2 ATTENTION DEFICIT HYPERACTIVITY DISORDER (ADHD), COMBINED TYPE: ICD-10-CM

## 2024-07-29 DIAGNOSIS — M51.36 DEGENERATIVE DISC DISEASE, LUMBAR: ICD-10-CM

## 2024-07-29 RX ORDER — IBUPROFEN 800 MG/1
800 TABLET ORAL EVERY 8 HOURS PRN
Qty: 180 TABLET | Refills: 1 | Status: SHIPPED | OUTPATIENT
Start: 2024-07-29 | End: 2024-10-15

## 2024-07-29 RX ORDER — DEXTROAMPHETAMINE SACCHARATE, AMPHETAMINE ASPARTATE, DEXTROAMPHETAMINE SULFATE AND AMPHETAMINE SULFATE 5; 5; 5; 5 MG/1; MG/1; MG/1; MG/1
20 TABLET ORAL DAILY
Qty: 30 TABLET | Refills: 0 | Status: SHIPPED | OUTPATIENT
Start: 2024-07-29 | End: 2024-08-28

## 2024-07-29 RX ORDER — DEXTROAMPHETAMINE SACCHARATE, AMPHETAMINE ASPARTATE MONOHYDRATE, DEXTROAMPHETAMINE SULFATE AND AMPHETAMINE SULFATE 5; 5; 5; 5 MG/1; MG/1; MG/1; MG/1
20 CAPSULE, EXTENDED RELEASE ORAL DAILY
Qty: 90 CAPSULE | Refills: 0 | Status: SHIPPED | OUTPATIENT
Start: 2024-07-29 | End: 2024-10-27

## 2024-08-06 ENCOUNTER — PATIENT MESSAGE (OUTPATIENT)
Dept: PRIMARY CARE CLINIC | Age: 49
End: 2024-08-06

## 2024-08-06 DIAGNOSIS — M51.26 DISPLACEMENT OF LUMBAR INTERVERTEBRAL DISC WITHOUT MYELOPATHY: ICD-10-CM

## 2024-08-06 DIAGNOSIS — M47.26 OSTEOARTHRITIS OF SPINE WITH RADICULOPATHY, LUMBAR REGION: ICD-10-CM

## 2024-08-06 RX ORDER — OXYCODONE HYDROCHLORIDE AND ACETAMINOPHEN 5; 325 MG/1; MG/1
1 TABLET ORAL NIGHTLY PRN
Qty: 30 TABLET | Refills: 0 | Status: SHIPPED | OUTPATIENT
Start: 2024-08-06 | End: 2024-09-05

## 2024-08-06 NOTE — TELEPHONE ENCOUNTER
From: Eve Vázquez  To: Renetta Og  Sent: 8/6/2024 2:52 AM EDT  Subject: Medication     Can you put in a script for my Oxcodone. I’m not seeing it anywhere to be refilled.   Thank you

## 2024-08-26 DIAGNOSIS — J41.8 MIXED SIMPLE AND MUCOPURULENT CHRONIC BRONCHITIS (HCC): ICD-10-CM

## 2024-08-26 RX ORDER — BUDESONIDE AND FORMOTEROL FUMARATE DIHYDRATE 160; 4.5 UG/1; UG/1
AEROSOL RESPIRATORY (INHALATION)
Qty: 30.6 G | Refills: 3 | Status: SHIPPED | OUTPATIENT
Start: 2024-08-26

## 2024-09-04 ENCOUNTER — OFFICE VISIT (OUTPATIENT)
Dept: PRIMARY CARE CLINIC | Age: 49
End: 2024-09-04
Payer: COMMERCIAL

## 2024-09-04 VITALS
RESPIRATION RATE: 14 BRPM | SYSTOLIC BLOOD PRESSURE: 138 MMHG | HEART RATE: 76 BPM | BODY MASS INDEX: 32.08 KG/M2 | OXYGEN SATURATION: 97 % | WEIGHT: 192.8 LBS | DIASTOLIC BLOOD PRESSURE: 84 MMHG

## 2024-09-04 DIAGNOSIS — F90.2 ATTENTION DEFICIT HYPERACTIVITY DISORDER (ADHD), COMBINED TYPE: Primary | ICD-10-CM

## 2024-09-04 DIAGNOSIS — F17.200 SMOKER: ICD-10-CM

## 2024-09-04 DIAGNOSIS — M51.36 DEGENERATIVE DISC DISEASE, LUMBAR: ICD-10-CM

## 2024-09-04 DIAGNOSIS — L23.7 ALLERGIC CONTACT DERMATITIS DUE TO PLANTS, EXCEPT FOOD: ICD-10-CM

## 2024-09-04 PROCEDURE — 99214 OFFICE O/P EST MOD 30 MIN: CPT | Performed by: NURSE PRACTITIONER

## 2024-09-04 RX ORDER — CETIRIZINE HYDROCHLORIDE 10 MG/1
10 TABLET ORAL DAILY
Qty: 90 TABLET | Refills: 1 | Status: SHIPPED | OUTPATIENT
Start: 2024-09-04

## 2024-09-04 RX ORDER — DEXTROAMPHETAMINE SACCHARATE, AMPHETAMINE ASPARTATE MONOHYDRATE, DEXTROAMPHETAMINE SULFATE AND AMPHETAMINE SULFATE 5; 5; 5; 5 MG/1; MG/1; MG/1; MG/1
20 CAPSULE, EXTENDED RELEASE ORAL DAILY
Qty: 90 CAPSULE | Refills: 0 | Status: SHIPPED | OUTPATIENT
Start: 2024-09-04 | End: 2024-12-03

## 2024-09-04 RX ORDER — FAMOTIDINE 40 MG/1
40 TABLET, FILM COATED ORAL EVERY EVENING
Qty: 30 TABLET | Refills: 0 | Status: SHIPPED | OUTPATIENT
Start: 2024-09-04

## 2024-09-04 RX ORDER — TRIAMCINOLONE ACETONIDE 1 MG/G
OINTMENT TOPICAL 2 TIMES DAILY
Qty: 30 G | Refills: 0 | Status: SHIPPED | OUTPATIENT
Start: 2024-09-04 | End: 2024-09-11

## 2024-09-04 RX ORDER — DEXTROAMPHETAMINE SACCHARATE, AMPHETAMINE ASPARTATE, DEXTROAMPHETAMINE SULFATE AND AMPHETAMINE SULFATE 5; 5; 5; 5 MG/1; MG/1; MG/1; MG/1
20 TABLET ORAL DAILY
Qty: 30 TABLET | Refills: 0 | Status: SHIPPED | OUTPATIENT
Start: 2024-09-04 | End: 2024-10-04

## 2024-09-04 ASSESSMENT — ENCOUNTER SYMPTOMS
WHEEZING: 0
BACK PAIN: 1
CHEST TIGHTNESS: 0
NAUSEA: 0
TROUBLE SWALLOWING: 0
EYE REDNESS: 0
SINUS PRESSURE: 0
SHORTNESS OF BREATH: 0
SORE THROAT: 0
DIARRHEA: 0
EYE ITCHING: 0
COUGH: 0
VOMITING: 0
ABDOMINAL PAIN: 0
SINUS PAIN: 0
EYE DISCHARGE: 0

## 2024-09-04 ASSESSMENT — PATIENT HEALTH QUESTIONNAIRE - PHQ9
1. LITTLE INTEREST OR PLEASURE IN DOING THINGS: NOT AT ALL
SUM OF ALL RESPONSES TO PHQ QUESTIONS 1-9: 0
2. FEELING DOWN, DEPRESSED OR HOPELESS: NOT AT ALL
SUM OF ALL RESPONSES TO PHQ QUESTIONS 1-9: 0
SUM OF ALL RESPONSES TO PHQ9 QUESTIONS 1 & 2: 0
SUM OF ALL RESPONSES TO PHQ QUESTIONS 1-9: 0
SUM OF ALL RESPONSES TO PHQ QUESTIONS 1-9: 0

## 2024-09-04 NOTE — PROGRESS NOTES
capsule INHALE THE CONTENTS OF 1 CAPSULE USING THE HANDIHALER INTO THE LUNGS DAILY 90 capsule 2    sennosides-docusate sodium (SENOKOT-S) 8.6-50 MG tablet Take 1-2 tablets daily until desired bm, then 1 daily 90 tablet 0    fexofenadine (ALLEGRA) 180 MG tablet Take 1 tablet by mouth daily 30 tablet 5    azelastine (ASTELIN) 0.1 % nasal spray 2 sprays by Nasal route 2 times daily Use in each nostril as directed 120 mL 1    Calcium Carbonate+Vitamin D 600-200 MG-UNIT TABS Take 1 tablet by mouth daily 90 tablet 1    Multiple Vitamins-Minerals (CENTRUM SILVER PO) Take by mouth      aspirin 325 MG tablet Take 1 tablet by mouth       No current facility-administered medications for this visit.     Allergies   Allergen Reactions    Pneumovax [Pneumococcal Polysaccharide Vaccine] Swelling     Localized swelling and facial swelling    Prevnar 13  [Pneumococcal 13-Andria Conj Vacc] Swelling       Health Maintenance   Topic Date Due    HIV screen  Never done    Hepatitis C screen  Never done    Hepatitis B vaccine (1 of 3 - 19+ 3-dose series) Never done    Lung Cancer Screening &/or Counseling  07/02/2023    Shingles vaccine (2 of 2) 04/05/2024    DTaP/Tdap/Td vaccine (2 - Td or Tdap) 05/28/2024    Flu vaccine (1) 08/01/2024    COVID-19 Vaccine (3 - 2023-24 season) 09/01/2024    Depression Screen  06/03/2025    Breast cancer screen  05/18/2026    Lipids  02/03/2028    Colorectal Cancer Screen  04/19/2034    Hepatitis A vaccine  Aged Out    Hib vaccine  Aged Out    Polio vaccine  Aged Out    Meningococcal (ACWY) vaccine  Aged Out    A1C test (Diabetic or Prediabetic)  Discontinued       :     Review of Systems   Constitutional:  Negative for chills, fatigue and fever.   HENT:  Negative for ear discharge, ear pain, sinus pressure, sinus pain, sore throat and trouble swallowing.    Eyes:  Negative for discharge, redness and itching.   Respiratory:  Negative for cough, chest tightness, shortness of breath and wheezing.

## 2024-10-03 RX ORDER — FAMOTIDINE 40 MG/1
40 TABLET, FILM COATED ORAL EVERY EVENING
Qty: 30 TABLET | Refills: 0 | Status: SHIPPED | OUTPATIENT
Start: 2024-10-03

## 2024-10-22 DIAGNOSIS — F90.2 ATTENTION DEFICIT HYPERACTIVITY DISORDER (ADHD), COMBINED TYPE: ICD-10-CM

## 2024-10-22 DIAGNOSIS — M51.369 DEGENERATIVE DISC DISEASE, LUMBAR: ICD-10-CM

## 2024-10-22 RX ORDER — DEXTROAMPHETAMINE SACCHARATE, AMPHETAMINE ASPARTATE MONOHYDRATE, DEXTROAMPHETAMINE SULFATE AND AMPHETAMINE SULFATE 5; 5; 5; 5 MG/1; MG/1; MG/1; MG/1
20 CAPSULE, EXTENDED RELEASE ORAL DAILY
Qty: 90 CAPSULE | Refills: 0 | Status: SHIPPED | OUTPATIENT
Start: 2024-10-22 | End: 2025-01-20

## 2024-10-22 RX ORDER — DEXTROAMPHETAMINE SACCHARATE, AMPHETAMINE ASPARTATE, DEXTROAMPHETAMINE SULFATE AND AMPHETAMINE SULFATE 5; 5; 5; 5 MG/1; MG/1; MG/1; MG/1
20 TABLET ORAL DAILY
Qty: 30 TABLET | Refills: 0 | Status: SHIPPED | OUTPATIENT
Start: 2024-10-22 | End: 2024-11-21

## 2024-10-22 RX ORDER — IBUPROFEN 800 MG/1
800 TABLET, FILM COATED ORAL EVERY 8 HOURS PRN
Qty: 180 TABLET | Refills: 1 | Status: SHIPPED | OUTPATIENT
Start: 2024-10-22 | End: 2025-01-08

## 2024-11-07 RX ORDER — FAMOTIDINE 40 MG/1
40 TABLET, FILM COATED ORAL EVERY EVENING
Qty: 30 TABLET | Refills: 0 | Status: SHIPPED | OUTPATIENT
Start: 2024-11-07

## 2024-12-04 ENCOUNTER — OFFICE VISIT (OUTPATIENT)
Dept: PRIMARY CARE CLINIC | Age: 49
End: 2024-12-04

## 2024-12-04 VITALS
WEIGHT: 189 LBS | RESPIRATION RATE: 14 BRPM | DIASTOLIC BLOOD PRESSURE: 78 MMHG | SYSTOLIC BLOOD PRESSURE: 124 MMHG | OXYGEN SATURATION: 95 % | BODY MASS INDEX: 31.45 KG/M2 | HEART RATE: 67 BPM

## 2024-12-04 DIAGNOSIS — M51.26 DISPLACEMENT OF LUMBAR INTERVERTEBRAL DISC WITHOUT MYELOPATHY: ICD-10-CM

## 2024-12-04 DIAGNOSIS — Z87.891 PERSONAL HISTORY OF TOBACCO USE: ICD-10-CM

## 2024-12-04 DIAGNOSIS — J41.8 MIXED SIMPLE AND MUCOPURULENT CHRONIC BRONCHITIS (HCC): ICD-10-CM

## 2024-12-04 DIAGNOSIS — Z12.11 COLON CANCER SCREENING: ICD-10-CM

## 2024-12-04 DIAGNOSIS — Z00.00 ENCOUNTER FOR WELL ADULT EXAM WITHOUT ABNORMAL FINDINGS: Primary | ICD-10-CM

## 2024-12-04 DIAGNOSIS — M46.1 SACROILIITIS, NOT ELSEWHERE CLASSIFIED (HCC): ICD-10-CM

## 2024-12-04 DIAGNOSIS — J45.909 UNCOMPLICATED ASTHMA, UNSPECIFIED ASTHMA SEVERITY, UNSPECIFIED WHETHER PERSISTENT: ICD-10-CM

## 2024-12-04 DIAGNOSIS — M54.16 CHRONIC LUMBAR RADICULOPATHY: ICD-10-CM

## 2024-12-04 DIAGNOSIS — K59.04 CHRONIC IDIOPATHIC CONSTIPATION: ICD-10-CM

## 2024-12-04 DIAGNOSIS — F90.2 ATTENTION DEFICIT HYPERACTIVITY DISORDER (ADHD), COMBINED TYPE: ICD-10-CM

## 2024-12-04 DIAGNOSIS — J06.9 UPPER RESPIRATORY TRACT INFECTION, UNSPECIFIED TYPE: ICD-10-CM

## 2024-12-04 DIAGNOSIS — F17.200 SMOKER: ICD-10-CM

## 2024-12-04 DIAGNOSIS — E78.2 MIXED HYPERLIPIDEMIA: ICD-10-CM

## 2024-12-04 RX ORDER — AZITHROMYCIN 250 MG/1
TABLET, FILM COATED ORAL
Qty: 1 PACKET | Refills: 0 | Status: SHIPPED | OUTPATIENT
Start: 2024-12-04 | End: 2024-12-14

## 2024-12-04 RX ORDER — OXYCODONE AND ACETAMINOPHEN 5; 325 MG/1; MG/1
1 TABLET ORAL DAILY PRN
Qty: 30 TABLET | Refills: 0 | Status: SHIPPED | OUTPATIENT
Start: 2024-12-04 | End: 2025-01-03

## 2024-12-04 RX ORDER — TIOTROPIUM BROMIDE 18 UG/1
CAPSULE ORAL; RESPIRATORY (INHALATION)
Qty: 90 CAPSULE | Refills: 2 | Status: SHIPPED | OUTPATIENT
Start: 2024-12-04

## 2024-12-04 RX ORDER — SENNA AND DOCUSATE SODIUM 50; 8.6 MG/1; MG/1
TABLET, FILM COATED ORAL
Qty: 90 TABLET | Refills: 2 | Status: SHIPPED | OUTPATIENT
Start: 2024-12-04

## 2024-12-04 RX ORDER — DEXTROAMPHETAMINE SACCHARATE, AMPHETAMINE ASPARTATE, DEXTROAMPHETAMINE SULFATE AND AMPHETAMINE SULFATE 5; 5; 5; 5 MG/1; MG/1; MG/1; MG/1
20 TABLET ORAL DAILY
Qty: 30 TABLET | Refills: 0 | Status: SHIPPED | OUTPATIENT
Start: 2024-12-04 | End: 2025-01-03

## 2024-12-04 RX ORDER — METHYLPREDNISOLONE 4 MG/1
TABLET ORAL
Qty: 1 KIT | Refills: 0 | Status: SHIPPED | OUTPATIENT
Start: 2024-12-04 | End: 2024-12-10

## 2024-12-04 RX ORDER — DEXTROAMPHETAMINE SACCHARATE, AMPHETAMINE ASPARTATE MONOHYDRATE, DEXTROAMPHETAMINE SULFATE AND AMPHETAMINE SULFATE 5; 5; 5; 5 MG/1; MG/1; MG/1; MG/1
20 CAPSULE, EXTENDED RELEASE ORAL DAILY
Qty: 90 CAPSULE | Refills: 0 | Status: SHIPPED | OUTPATIENT
Start: 2024-12-04 | End: 2025-03-04

## 2024-12-04 SDOH — ECONOMIC STABILITY: FOOD INSECURITY: WITHIN THE PAST 12 MONTHS, YOU WORRIED THAT YOUR FOOD WOULD RUN OUT BEFORE YOU GOT MONEY TO BUY MORE.: NEVER TRUE

## 2024-12-04 SDOH — ECONOMIC STABILITY: FOOD INSECURITY: WITHIN THE PAST 12 MONTHS, THE FOOD YOU BOUGHT JUST DIDN'T LAST AND YOU DIDN'T HAVE MONEY TO GET MORE.: NEVER TRUE

## 2024-12-04 SDOH — ECONOMIC STABILITY: INCOME INSECURITY: HOW HARD IS IT FOR YOU TO PAY FOR THE VERY BASICS LIKE FOOD, HOUSING, MEDICAL CARE, AND HEATING?: NOT HARD AT ALL

## 2024-12-04 ASSESSMENT — PATIENT HEALTH QUESTIONNAIRE - PHQ9
SUM OF ALL RESPONSES TO PHQ QUESTIONS 1-9: 0
2. FEELING DOWN, DEPRESSED OR HOPELESS: NOT AT ALL
1. LITTLE INTEREST OR PLEASURE IN DOING THINGS: NOT AT ALL
SUM OF ALL RESPONSES TO PHQ9 QUESTIONS 1 & 2: 0
SUM OF ALL RESPONSES TO PHQ QUESTIONS 1-9: 0

## 2024-12-04 NOTE — PROGRESS NOTES
Well Adult Note  Name: Eve Vázquez Today’s Date: 2024   MRN: [id number] Sex: Female   Age: 49 y.o. Ethnicity: Non- / Non    : 1975 Race: White (non-)      Eve Vázquez is here for a well adult exam.       Assessment & Plan   Encounter for well adult exam without abnormal findings  -She is going to get the lab work from her work and provide us with the results  Personal history of tobacco use  -     OH VISIT TO DISCUSS LUNG CA SCREEN W LDCT  -     CT Lung Screen (Initial/Annual/Baseline); Future  Colon cancer screening  -     Fecal DNA Colorectal cancer screening (Cologuard)  Attention deficit hyperactivity disorder (ADHD), combined type  -   REFILL  amphetamine-dextroamphetamine (ADDERALL XR) 20 MG extended release capsule; Take 1 capsule by mouth daily for 90 days., Disp-90 capsule, R-0Normal  -   REFILL  amphetamine-dextroamphetamine (ADDERALL, 20MG,) 20 MG tablet; Take 1 tablet by mouth daily for 30 days., Disp-30 tablet, R-0Normal  Chronic idiopathic constipation  -   REFILL  sennosides-docusate sodium (SENOKOT-S) 8.6-50 MG tablet; Take 1-2 tablets daily until desired bm, then 1 daily, Disp-90 tablet, R-2Normal  -no longer needs trulance   Mixed simple and mucopurulent chronic bronchitis (HCC)  -    REFILL tiotropium (SPIRIVA HANDIHALER) 18 MCG inhalation capsule; INHALE THE CONTENTS OF 1 CAPSULE USING THE HANDIHALER INTO THE LUNGS DAILY, Disp-90 capsule, R-2Normal  -S she does not follow with pulmonology.  She does need a refill on the Spiriva.  She is also on Symbicort 160 mcg 2 puffs twice a day.  She does have albuterol as needed  Uncomplicated asthma, unspecified asthma severity, unspecified whether persistent  -     tiotropium (SPIRIVA HANDIHALER) 18 MCG inhalation capsule; INHALE THE CONTENTS OF 1 CAPSULE USING THE HANDIHALER INTO THE LUNGS DAILY, Disp-90 capsule, R-2Normal  Sacroiliitis, not elsewhere classified (HCC)  -She does plan on following up with pain

## 2024-12-05 PROBLEM — D68.59 HYPERCOAGULABLE STATE (HCC): Status: RESOLVED | Noted: 2017-03-06 | Resolved: 2024-12-05

## 2024-12-05 PROBLEM — I82.90 DEEP VEIN THROMBOSIS (DVT) OF NON-EXTREMITY VEIN: Status: RESOLVED | Noted: 2021-03-31 | Resolved: 2024-12-05

## 2024-12-05 PROBLEM — I82.C29: Status: RESOLVED | Noted: 2017-07-19 | Resolved: 2024-12-05

## 2024-12-05 PROBLEM — R59.1 LYMPHADENOPATHY: Status: RESOLVED | Noted: 2024-06-03 | Resolved: 2024-12-05

## 2024-12-05 ASSESSMENT — ENCOUNTER SYMPTOMS
SHORTNESS OF BREATH: 0
EYE DISCHARGE: 0
SINUS PRESSURE: 1
EYE REDNESS: 0
ABDOMINAL PAIN: 0
VOMITING: 0
SORE THROAT: 0
CHEST TIGHTNESS: 0
TROUBLE SWALLOWING: 0
DIARRHEA: 0
NAUSEA: 0
SINUS PAIN: 1
COUGH: 1
WHEEZING: 0
EYE ITCHING: 0

## 2024-12-06 ENCOUNTER — HOSPITAL ENCOUNTER (OUTPATIENT)
Age: 49
Discharge: HOME | End: 2024-12-06
Payer: COMMERCIAL

## 2024-12-06 LAB
ALBUMIN SERPL-MCNC: 4.3 G/DL (ref 3.5–5.2)
ALBUMIN/GLOB SERPL: 1.8 {RATIO} (ref 1–2.5)
ALP SERPL-CCNC: 83 U/L (ref 35–104)
ALT SERPL-CCNC: 20 U/L (ref 10–35)
ANION GAP SERPL CALCULATED.3IONS-SCNC: 10 MMOL/L (ref 9–16)
AST SERPL-CCNC: 21 U/L (ref 10–35)
BILIRUB SERPL-MCNC: 0.4 MG/DL (ref 0–1.2)
BUN SERPL-MCNC: 17 MG/DL (ref 6–20)
CALCIUM SERPL-MCNC: 9.5 MG/DL (ref 8.6–10.4)
CHLORIDE SERPL-SCNC: 104 MMOL/L (ref 98–107)
CHOLEST SERPL-MCNC: 209 MG/DL (ref 0–199)
CHOLESTEROL/HDL RATIO: 2.3
CO2 SERPL-SCNC: 26 MMOL/L (ref 20–31)
CREAT SERPL-MCNC: 0.6 MG/DL (ref 0.6–0.9)
ERYTHROCYTE [DISTWIDTH] IN BLOOD BY AUTOMATED COUNT: 14.4 % (ref 11.8–14.4)
EST. AVERAGE GLUCOSE BLD GHB EST-MCNC: 117 MG/DL
GFR, ESTIMATED: >90 ML/MIN/1.73M2
GLUCOSE SERPL-MCNC: 99 MG/DL (ref 74–99)
HBA1C MFR BLD: 5.7 % (ref 4–6)
HCT VFR BLD AUTO: 45.6 % (ref 36.3–47.1)
HDLC SERPL-MCNC: 89 MG/DL
HGB BLD-MCNC: 14.6 G/DL (ref 11.9–15.1)
LDLC SERPL CALC-MCNC: 111 MG/DL (ref 0–100)
MCH RBC QN AUTO: 30.8 PG (ref 25.2–33.5)
MCHC RBC AUTO-ENTMCNC: 32 G/DL (ref 28.4–34.8)
MCV RBC AUTO: 96.2 FL (ref 82.6–102.9)
NRBC BLD-RTO: 0 PER 100 WBC
PLATELET # BLD AUTO: 343 K/UL (ref 138–453)
PMV BLD AUTO: 10.5 FL (ref 8.1–13.5)
POTASSIUM SERPL-SCNC: 4.5 MMOL/L (ref 3.7–5.3)
PROT SERPL-MCNC: 6.7 G/DL (ref 6.6–8.7)
RBC # BLD AUTO: 4.74 M/UL (ref 3.95–5.11)
SODIUM SERPL-SCNC: 140 MMOL/L (ref 136–145)
T4 FREE SERPL-MCNC: 1.2 NG/DL (ref 0.92–1.68)
TRIGL SERPL-MCNC: 47 MG/DL
TSH SERPL DL<=0.05 MIU/L-ACNC: 0.71 UIU/ML (ref 0.27–4.2)
VLDLC SERPL CALC-MCNC: 9 MG/DL (ref 1–30)
WBC OTHER # BLD: 10.6 K/UL (ref 3.5–11.3)

## 2024-12-06 PROCEDURE — 80061 LIPID PANEL: CPT

## 2024-12-06 PROCEDURE — 85027 COMPLETE CBC AUTOMATED: CPT

## 2024-12-06 PROCEDURE — 80053 COMPREHEN METABOLIC PANEL: CPT

## 2024-12-06 PROCEDURE — 84443 ASSAY THYROID STIM HORMONE: CPT

## 2024-12-06 PROCEDURE — 83036 HEMOGLOBIN GLYCOSYLATED A1C: CPT

## 2024-12-06 PROCEDURE — 84439 ASSAY OF FREE THYROXINE: CPT

## 2024-12-06 PROCEDURE — 36415 COLL VENOUS BLD VENIPUNCTURE: CPT

## 2024-12-16 ENCOUNTER — HOSPITAL ENCOUNTER (OUTPATIENT)
Dept: CT IMAGING | Age: 49
Discharge: HOME | End: 2024-12-18
Payer: COMMERCIAL

## 2024-12-16 DIAGNOSIS — Z87.891 PERSONAL HISTORY OF TOBACCO USE: ICD-10-CM

## 2024-12-16 PROCEDURE — 71271 CT THORAX LUNG CANCER SCR C-: CPT

## 2024-12-24 LAB — NONINV COLON CA DNA+OCC BLD SCRN STL QL: NEGATIVE

## 2025-01-06 DIAGNOSIS — M51.369 DEGENERATIVE DISC DISEASE, LUMBAR: ICD-10-CM

## 2025-01-06 DIAGNOSIS — F90.2 ATTENTION DEFICIT HYPERACTIVITY DISORDER (ADHD), COMBINED TYPE: ICD-10-CM

## 2025-01-06 RX ORDER — DEXTROAMPHETAMINE SACCHARATE, AMPHETAMINE ASPARTATE MONOHYDRATE, DEXTROAMPHETAMINE SULFATE AND AMPHETAMINE SULFATE 5; 5; 5; 5 MG/1; MG/1; MG/1; MG/1
20 CAPSULE, EXTENDED RELEASE ORAL DAILY
Qty: 90 CAPSULE | Refills: 0 | Status: SHIPPED | OUTPATIENT
Start: 2025-01-06 | End: 2025-04-06

## 2025-01-06 RX ORDER — DEXTROAMPHETAMINE SACCHARATE, AMPHETAMINE ASPARTATE, DEXTROAMPHETAMINE SULFATE AND AMPHETAMINE SULFATE 5; 5; 5; 5 MG/1; MG/1; MG/1; MG/1
20 TABLET ORAL DAILY
Qty: 30 TABLET | Refills: 0 | Status: SHIPPED | OUTPATIENT
Start: 2025-01-06 | End: 2025-02-05

## 2025-01-06 RX ORDER — IBUPROFEN 800 MG/1
800 TABLET, FILM COATED ORAL EVERY 8 HOURS PRN
Qty: 180 TABLET | Refills: 1 | Status: SHIPPED | OUTPATIENT
Start: 2025-01-06 | End: 2025-03-25

## 2025-01-13 ENCOUNTER — PATIENT MESSAGE (OUTPATIENT)
Dept: PRIMARY CARE CLINIC | Age: 50
End: 2025-01-13

## 2025-01-13 DIAGNOSIS — M51.26 DISPLACEMENT OF LUMBAR INTERVERTEBRAL DISC WITHOUT MYELOPATHY: ICD-10-CM

## 2025-01-13 RX ORDER — OXYCODONE AND ACETAMINOPHEN 5; 325 MG/1; MG/1
1 TABLET ORAL DAILY PRN
Qty: 30 TABLET | Refills: 0 | Status: SHIPPED | OUTPATIENT
Start: 2025-01-13 | End: 2025-01-15

## 2025-01-15 ENCOUNTER — OFFICE VISIT (OUTPATIENT)
Dept: PRIMARY CARE CLINIC | Age: 50
End: 2025-01-15
Payer: COMMERCIAL

## 2025-01-15 VITALS
BODY MASS INDEX: 31.72 KG/M2 | HEART RATE: 80 BPM | WEIGHT: 190.6 LBS | SYSTOLIC BLOOD PRESSURE: 130 MMHG | OXYGEN SATURATION: 96 % | DIASTOLIC BLOOD PRESSURE: 76 MMHG

## 2025-01-15 DIAGNOSIS — M54.16 CHRONIC LUMBAR RADICULOPATHY: ICD-10-CM

## 2025-01-15 DIAGNOSIS — M54.16 CHRONIC LUMBAR RADICULOPATHY: Primary | ICD-10-CM

## 2025-01-15 DIAGNOSIS — Z23 NEED FOR TDAP VACCINATION: ICD-10-CM

## 2025-01-15 PROCEDURE — 99214 OFFICE O/P EST MOD 30 MIN: CPT | Performed by: NURSE PRACTITIONER

## 2025-01-15 PROCEDURE — 90471 IMMUNIZATION ADMIN: CPT | Performed by: NURSE PRACTITIONER

## 2025-01-15 PROCEDURE — 90715 TDAP VACCINE 7 YRS/> IM: CPT | Performed by: NURSE PRACTITIONER

## 2025-01-15 RX ORDER — OXYCODONE HYDROCHLORIDE 10 MG/1
10 TABLET ORAL 2 TIMES DAILY PRN
Qty: 60 TABLET | Refills: 0 | Status: SHIPPED | OUTPATIENT
Start: 2025-01-15 | End: 2025-02-14

## 2025-01-15 RX ORDER — OXYCODONE HYDROCHLORIDE 10 MG/1
10 TABLET ORAL 2 TIMES DAILY PRN
Qty: 60 TABLET | Refills: 0 | Status: SHIPPED | OUTPATIENT
Start: 2025-01-15 | End: 2025-01-15 | Stop reason: SDUPTHER

## 2025-01-15 SDOH — ECONOMIC STABILITY: FOOD INSECURITY: WITHIN THE PAST 12 MONTHS, THE FOOD YOU BOUGHT JUST DIDN'T LAST AND YOU DIDN'T HAVE MONEY TO GET MORE.: NEVER TRUE

## 2025-01-15 SDOH — ECONOMIC STABILITY: FOOD INSECURITY: WITHIN THE PAST 12 MONTHS, YOU WORRIED THAT YOUR FOOD WOULD RUN OUT BEFORE YOU GOT MONEY TO BUY MORE.: NEVER TRUE

## 2025-01-15 ASSESSMENT — PATIENT HEALTH QUESTIONNAIRE - PHQ9
1. LITTLE INTEREST OR PLEASURE IN DOING THINGS: NOT AT ALL
SUM OF ALL RESPONSES TO PHQ9 QUESTIONS 1 & 2: 0
SUM OF ALL RESPONSES TO PHQ QUESTIONS 1-9: 0
2. FEELING DOWN, DEPRESSED OR HOPELESS: NOT AT ALL
SUM OF ALL RESPONSES TO PHQ QUESTIONS 1-9: 0

## 2025-01-15 ASSESSMENT — ENCOUNTER SYMPTOMS
BACK PAIN: 1
EYE ITCHING: 0
EYE REDNESS: 0
TROUBLE SWALLOWING: 0
SHORTNESS OF BREATH: 0
SINUS PAIN: 0
CHEST TIGHTNESS: 0
SORE THROAT: 0
ABDOMINAL PAIN: 0
COUGH: 0
VOMITING: 0
NAUSEA: 0
WHEEZING: 0
EYE DISCHARGE: 0
SINUS PRESSURE: 0
DIARRHEA: 0

## 2025-01-15 NOTE — TELEPHONE ENCOUNTER
Patient was seen in office today and states [facility] will not accept the Oxycodone prescription. She is requesting it be sent to [facility].

## 2025-01-21 ENCOUNTER — TELEPHONE (OUTPATIENT)
Dept: PRIMARY CARE CLINIC | Age: 50
End: 2025-01-21

## 2025-01-21 NOTE — TELEPHONE ENCOUNTER
We did change it because it was not helping her pain. Her appt was after she picked up the first one. However, if they can't fill it I understand and she will have to wait.

## 2025-01-21 NOTE — TELEPHONE ENCOUNTER
Cindy, Pharmacist at [facility] called stating that patient was prescribed #30 percocet 5/325 daily.     Patient picked up this dose on 1/15/25    Patient was seen for an appt on 1/15/25 and the pain medication was changed to oxycodone 10 mg BID.    Patient is trying to fill the oxycodone 10 mg BID today 1/21/25.      There has only been 6 days in between this script , even if patient is taking BID , she shouldn't be out of the original medication . The pharmacy is required to contact the provider . They advised patient to finish original script but received push back from patient    Please advise.

## 2025-01-22 NOTE — TELEPHONE ENCOUNTER
Attempted to reach pharmacy, call was transferred from pharmacy tech to pharmacist. Pharmacist states that this would need discussed with someone from the \"dispensing location\"

## 2025-01-23 NOTE — TELEPHONE ENCOUNTER
Pain management, at least at [facility], rarely prescribes pain medication. She has been referred to a surgeon. Waiting to get in. I would recommend waiting until her script from the current one is done and we can send to another pharmacy since they dont feel comfortable.    Patient returned call. Please call when available.   Pharmacy [facility] - [place], [place] - [address]  [phone]

## 2025-01-23 NOTE — TELEPHONE ENCOUNTER
Writer spoke with Joseph at [facility] pharmacy, informed of PCP response. He states their main concern/question is why a primary care provider is prescribing such high amounts of opioids and why the patient is not seeing a pain management specialist. He states that it is not in their policy to dispense high amounts of controlled rx's being prescribed from a family doctor. Just FYI.

## 2025-01-30 ENCOUNTER — PATIENT MESSAGE (OUTPATIENT)
Dept: PRIMARY CARE CLINIC | Age: 50
End: 2025-01-30

## 2025-02-10 DIAGNOSIS — M54.16 CHRONIC LUMBAR RADICULOPATHY: ICD-10-CM

## 2025-02-10 RX ORDER — OXYCODONE HYDROCHLORIDE 10 MG/1
10 TABLET ORAL 2 TIMES DAILY PRN
Qty: 60 TABLET | Refills: 0 | Status: SHIPPED | OUTPATIENT
Start: 2025-02-10 | End: 2025-03-12

## 2025-02-19 ENCOUNTER — OFFICE VISIT (OUTPATIENT)
Age: 50
End: 2025-02-19
Payer: COMMERCIAL

## 2025-02-19 VITALS
HEIGHT: 65 IN | BODY MASS INDEX: 31.65 KG/M2 | WEIGHT: 190 LBS | RESPIRATION RATE: 16 BRPM | DIASTOLIC BLOOD PRESSURE: 80 MMHG | HEART RATE: 83 BPM | SYSTOLIC BLOOD PRESSURE: 124 MMHG

## 2025-02-19 DIAGNOSIS — M25.552 LEFT HIP PAIN: ICD-10-CM

## 2025-02-19 DIAGNOSIS — M54.16 LUMBAR RADICULOPATHY: Primary | ICD-10-CM

## 2025-02-19 PROCEDURE — 99214 OFFICE O/P EST MOD 30 MIN: CPT | Performed by: NEUROLOGICAL SURGERY

## 2025-02-19 NOTE — PROGRESS NOTES
[facility], [facility] NEUROSCIENCE [place], [facility] NEUROSURGERY  [address], [address]  [place] [id number]  Dept: [phone]  Dept Fax: [phone]     Patient:  Eve Vázquez  YOB: 1975  Date: 2/19/25      Chief Complaint   Patient presents with    Follow-up     Lumbar Pain, last seen 11/14/2022 , no new imaging           HPI:     I had the pleasure of seeing this patient back in the office today in follow-up.  As you know she is a 49-year-old female who has previously undergone a left L4-5 laminectomy for nerve root impingement in October 2019.  The patient was last seen in my office in November 2022 complaining of back as well as left lower extremity discomfort consistent with ongoing lumbar radiculopathy.  The patient now presents with ongoing pain.  She describes pain originating in the low back coursing left buttock and hip extending in the posterior lateral thigh and calf region predominately.  She has no symptoms in the right lower extremity.  Thus far she indicates she has been through pain management as well as physical therapy without improvement.    Examination today demonstrates mild reproducible discomfort over the left lower lumbar region.  Noel's testing is markedly positive on the left and negative on the right.  Strength and sensation are normal in both lower extremities.  Straight leg raising is -9 degrees.  Gait is steady    A plain AP x-ray of the pelvis and hips is reviewed.  No obvious fracture was identified.  I do not have the actual dictated report of the study.    Patient is a 49-year-old female with a known history of back as well as left lower extremity discomfort consistent with lumbar radiculopathy.  She presents now with ongoing complaints which are progressively worsening and remain consistent with lumbar radiculopathy.  Neurologic examination is unremarkable.  Physical exam does demonstrate a markedly

## 2025-02-26 ENCOUNTER — TELEPHONE (OUTPATIENT)
Age: 50
End: 2025-02-26

## 2025-02-26 NOTE — TELEPHONE ENCOUNTER
On 2/26/25 I received an insurance denial for location and MRI Spine dated 1/29/25. I did fax pain management notes and medication list to [employer] for a reconsideration. Patient is aware.

## 2025-02-28 NOTE — TELEPHONE ENCOUNTER
After sending medication list and pain management notes to insurance, the location is approved but the MRI is still denied due to no xray. Can one of you put in an xray order. Denial in epic.

## 2025-03-04 ENCOUNTER — OFFICE VISIT (OUTPATIENT)
Dept: PRIMARY CARE CLINIC | Age: 50
End: 2025-03-04
Payer: COMMERCIAL

## 2025-03-04 VITALS
HEART RATE: 72 BPM | WEIGHT: 186 LBS | DIASTOLIC BLOOD PRESSURE: 82 MMHG | OXYGEN SATURATION: 98 % | SYSTOLIC BLOOD PRESSURE: 132 MMHG | BODY MASS INDEX: 30.95 KG/M2

## 2025-03-04 DIAGNOSIS — M54.16 CHRONIC LUMBAR RADICULOPATHY: Primary | ICD-10-CM

## 2025-03-04 DIAGNOSIS — F90.2 ATTENTION DEFICIT HYPERACTIVITY DISORDER (ADHD), COMBINED TYPE: ICD-10-CM

## 2025-03-04 PROCEDURE — 99214 OFFICE O/P EST MOD 30 MIN: CPT | Performed by: NURSE PRACTITIONER

## 2025-03-04 RX ORDER — OXYCODONE HYDROCHLORIDE 10 MG/1
10 TABLET ORAL EVERY 8 HOURS PRN
Qty: 90 TABLET | Refills: 0 | Status: SHIPPED | OUTPATIENT
Start: 2025-03-04 | End: 2025-04-03

## 2025-03-04 RX ORDER — DEXTROAMPHETAMINE SACCHARATE, AMPHETAMINE ASPARTATE MONOHYDRATE, DEXTROAMPHETAMINE SULFATE AND AMPHETAMINE SULFATE 5; 5; 5; 5 MG/1; MG/1; MG/1; MG/1
20 CAPSULE, EXTENDED RELEASE ORAL DAILY
Qty: 90 CAPSULE | Refills: 0 | Status: SHIPPED | OUTPATIENT
Start: 2025-03-04 | End: 2025-06-02

## 2025-03-04 RX ORDER — DEXTROAMPHETAMINE SACCHARATE, AMPHETAMINE ASPARTATE, DEXTROAMPHETAMINE SULFATE AND AMPHETAMINE SULFATE 5; 5; 5; 5 MG/1; MG/1; MG/1; MG/1
20 TABLET ORAL DAILY
Qty: 30 TABLET | Refills: 0 | Status: SHIPPED | OUTPATIENT
Start: 2025-03-04 | End: 2025-04-03

## 2025-03-04 NOTE — PROGRESS NOTES
[facility]  [facility] PRIMARY CARE  [address] [place] [address] DR  [address]  [facility] [address]  Dept: [phone]  Dept Fax: [phone]    Eve Vázquez is a 49 y.o. female who presentstoday for her medical conditions/complaints as noted below.  Eve Vázquez is c/o of  Chief Complaint   Patient presents with    Lower Back Pain     Lower back pain, down hip and leg, x8mos     Letter stating medicines, when increased d/t pain and when saw pain management.   -fax to briana at dr. THOMAS's office     HPI:     History of Present Illness  The patient presents for evaluation of back pain.    She has been under the care of Dr. Beltran for her back condition, which necessitates an x-ray that is already scheduled. She was unable to undergo the procedure today due to forgetting her wallet at work. She requests a letter to be faxed to Dr. Beltran's office detailing her current medication regimen, including the recent increase in oxycodone dosage, to substantiate her pain levels and facilitate the approval of an MRI by her insurance company. She has previously completed a course of physical therapy and is being advised to repeat it. The decision regarding surgical intervention remains pending. She reports a decrease in weight, attributing it to her sedentary lifestyle necessitated by her back pain. She expresses a desire for a few days of relief from pain. She has had 3 consultations with pain management specialists within the past year. She finds relief from her pain through the use of gummies but dislikes the associated high feeling. The pain relief provided by oxycodone lasts approximately 4 hours. She reserves her pain medication for nighttime use to aid sleep and takes one dose midway through her workday. She also takes ibuprofen 800 mg, up to 4 times daily, which she finds beneficial. She requests a refill of her Adderall prescription and an increase in her pain medication dosage to 3 times

## 2025-03-05 ENCOUNTER — HOSPITAL ENCOUNTER (OUTPATIENT)
Age: 50
Discharge: HOME | End: 2025-03-07
Payer: COMMERCIAL

## 2025-03-05 ENCOUNTER — HOSPITAL ENCOUNTER (OUTPATIENT)
Dept: GENERAL RADIOLOGY | Age: 50
Discharge: HOME | End: 2025-03-07
Payer: COMMERCIAL

## 2025-03-05 DIAGNOSIS — M54.16 LUMBAR RADICULOPATHY: ICD-10-CM

## 2025-03-05 PROCEDURE — 72110 X-RAY EXAM L-2 SPINE 4/>VWS: CPT

## 2025-03-05 ASSESSMENT — ENCOUNTER SYMPTOMS
WHEEZING: 0
NAUSEA: 0
SINUS PRESSURE: 0
COUGH: 0
SORE THROAT: 0
CHEST TIGHTNESS: 0
EYE DISCHARGE: 0
SINUS PAIN: 0
EYE REDNESS: 0
DIARRHEA: 0
EYE ITCHING: 0
ABDOMINAL PAIN: 0
SHORTNESS OF BREATH: 0
VOMITING: 0
BACK PAIN: 1
TROUBLE SWALLOWING: 0

## 2025-03-26 ENCOUNTER — HOSPITAL ENCOUNTER (OUTPATIENT)
Age: 50
Setting detail: THERAPIES SERIES
Discharge: HOME | End: 2025-03-26
Attending: NEUROLOGICAL SURGERY
Payer: COMMERCIAL

## 2025-03-26 PROCEDURE — 97110 THERAPEUTIC EXERCISES: CPT

## 2025-03-26 PROCEDURE — 97162 PT EVAL MOD COMPLEX 30 MIN: CPT

## 2025-03-26 NOTE — CONSULTS
[facility] Rehabilitation &  Therapy  [address], [address]  [facility] [address]  [phone]  F: [phone]     Physical Therapy Spine Evaluation    Date:  3/26/2025  Patient: Eve Vázquez  : 1975  MRN: [id number]  Physician: Earnest Beltran      Insurance: [employer]; $3300.00 ded/$2094.62 left/20%;  VISITS   Medical Diagnosis: lumbar radiculopathy M54.18; (L) hip pain M25.552  Rehab Codes: low back pain M54.5; hip pain (L) M25.552  Onset Date: referral 3/24/25  Next 's appt.: PRN  Visit Count:    Cancel/No Show: 0/0    Subjective:   Patient presents to therapy with complaints of low back pain and complaints of (L) sided back pain and (L) sided radicular pain with complaints of symptoms to the (L) foot most distally.  Notes that symptoms into the (L) LE are constant.  Patient noted onset of symptoms in 2023 with fall.  Noted symptoms started in calf and back and were intermittent, now have progressed to the point where she has constant symptoms and symptoms throughout the back, hip, and down to the lateral foot.  Patient had injection which did not help with symptoms.  Has not had MRI, this is to be ordered per patient if symptoms do not improve.  Currently has significant issues with sitting and shifts off of (L) LE in sitting, is limited with standing and shifts off of side in standing.  Was limited with end range SLUMP testing, significantly limited with SLR testing, had difficulty with hip mobility testing.  Does have history of laminectomy in 2019 at L4-L5- symptoms were on the (R) side at that time- symptoms resolved after surgical intervention at that time.    CC: complaints of low back pain, complaints of constant radicular pain in the (L) LE to the (L) glut to the (L) lateral foot.  HPI: insidious onset of symptoms in 2019- worsening in the last 6-7 months per patient and this prompted MD appointment and referral to PT dated

## 2025-04-02 ENCOUNTER — HOSPITAL ENCOUNTER (OUTPATIENT)
Age: 50
Setting detail: THERAPIES SERIES
Discharge: HOME | End: 2025-04-02
Attending: NEUROLOGICAL SURGERY
Payer: COMMERCIAL

## 2025-04-02 PROCEDURE — 97110 THERAPEUTIC EXERCISES: CPT

## 2025-04-02 PROCEDURE — 97140 MANUAL THERAPY 1/> REGIONS: CPT

## 2025-04-02 NOTE — FLOWSHEET NOTE
[]  Vasocompression     [] Gait     []  Other     Total billable time 40 3       Assessment: [x] Progressing toward goals.    [] No change.     [] Other:  [x] Patient would continue to benefit from skilled physical therapy services in order to: work on decompression of lumbar spine, work on postural awareness, eventual stabilization program       STG: (to be met in 8 treatments)  ? Pain: Improved pain levels to 4-5/10 to help with generally improved function  ? ROM: lumbar extension to 10 degrees to help with functional mobility, hip mobility to 55 degrees, IR to 30 degrees to help with hip mobility  ? Strength:  ? Function: improved sitting tolerance to 10-15 minutes to help with sitting in car to work, improved standing tolerance to 15 minutes to help with meal prep  Independent with Home Exercise Programs     LTG: (to be met in 12 treatments)  ? Pain: Improved pain levels to 1-2/10 to help with generally improved function  ? ROM: lumbar extension to 15 degrees to help with functional mobility, hip mobility to 55 degrees, IR to 30 degrees to help with hip mobility  ? Strength:  ? Function: improved sitting tolerance to 30 minutes to help with sitting in car to work, improved standing tolerance to 30 minutes to help with meal prep; improved tolerance of laundry by 80% self report  Independent with Home Exercise Programs  Patient goals: ease pain     Functional Assessment Used: oswestry 33/50 66% limited  Current Status Score:  Goal Status Sore:  10/50 or better    Pt. Education:  [x] Yes  [] No  [x] Reviewed Prior HEP/Ed  Method of Education: [x] Verbal  [] Demo  [x] Written  Comprehension of Education:  [x] Verbalizes understanding.  [] Demonstrates understanding.  [] Needs review.  [] Demonstrates/verbalizes HEP/Ed previously given.    Exercises:GIVEN INITIALLY 3/26/25  Exercise Reps/ Time Weight/ Level Comments   Postural education     Improved bending, improved sitting, improved laying, improved rising

## 2025-04-03 DIAGNOSIS — M54.16 CHRONIC LUMBAR RADICULOPATHY: ICD-10-CM

## 2025-04-03 DIAGNOSIS — F90.2 ATTENTION DEFICIT HYPERACTIVITY DISORDER (ADHD), COMBINED TYPE: ICD-10-CM

## 2025-04-03 RX ORDER — OXYCODONE HYDROCHLORIDE 10 MG/1
10 TABLET ORAL EVERY 8 HOURS PRN
Qty: 90 TABLET | Refills: 0 | Status: SHIPPED | OUTPATIENT
Start: 2025-04-03 | End: 2025-05-03

## 2025-04-03 RX ORDER — DEXTROAMPHETAMINE SACCHARATE, AMPHETAMINE ASPARTATE, DEXTROAMPHETAMINE SULFATE AND AMPHETAMINE SULFATE 5; 5; 5; 5 MG/1; MG/1; MG/1; MG/1
20 TABLET ORAL DAILY
Qty: 30 TABLET | Refills: 0 | Status: SHIPPED | OUTPATIENT
Start: 2025-04-03 | End: 2025-05-03

## 2025-04-07 ENCOUNTER — HOSPITAL ENCOUNTER (OUTPATIENT)
Age: 50
Setting detail: THERAPIES SERIES
Discharge: HOME | End: 2025-04-07
Attending: NEUROLOGICAL SURGERY
Payer: COMMERCIAL

## 2025-04-07 ENCOUNTER — PATIENT MESSAGE (OUTPATIENT)
Dept: PRIMARY CARE CLINIC | Age: 50
End: 2025-04-07

## 2025-04-07 PROCEDURE — 97110 THERAPEUTIC EXERCISES: CPT

## 2025-04-07 PROCEDURE — 97140 MANUAL THERAPY 1/> REGIONS: CPT

## 2025-04-07 RX ORDER — IBUPROFEN 800 MG/1
800 TABLET, FILM COATED ORAL EVERY 8 HOURS PRN
COMMUNITY
Start: 2025-03-05 | End: 2025-04-07 | Stop reason: SDUPTHER

## 2025-04-07 NOTE — FLOWSHEET NOTE
[facility] Rehabilitation &  Therapy  [address], [address]  [facility] [address]  [phone]  F: [phone]     Physical Therapy Daily Treatment Note    Date:  2025  Patient Name:  Eve Vázquez    :  1975  MRN: [id number]  Physician: Earnest Beltran                                     Insurance: [employer]; $3300.00 ded/$2094.62 left/20%;  VISITS   Medical Diagnosis: lumbar radiculopathy M54.18; (L) hip pain M25.552               Rehab Codes: low back pain M54.5; hip pain (L) M25.552  Onset Date: referral 3/24/25             Next 's appt.: PRN  Visit Count: 3/12                                Cancel/No Show: 0/0        Subjective:    Pain:  [x] Yes  [] No   Location: low back, (L) gluteal region, to (L) lateral foot most distally            Pain Rating: (0-10 scale) 5/10  Pain altered Tx:  [] Yes  [] No  Action:  Comments:     Continues to note complaints of pain into the (L) foot, more as the work day progresses, slightly improved today versus last treatment session.    Objective:         Precautions:          Treatment provided:       Exercise Reps/ Time Weight/ Level Comments   (L) sidelying rotation 3 x 20\"; 2 x 20\"     SKTC 10 x 3\"  manually   Piriformis stretch 10 x 3\"  manually         Review of postural modifications   Decompression; sitting                                                                                       Manual   (B) traction, PA mobilization grade 1-2; massage gun setting 3 (B) paraspinals; unilateral traction     Response to treatment: Was able to complete other exercises, then attempted unilateral traction today with the patient.  Patient stated this \"really woke up\" her symptoms.  Had significantly increased complaints symptoms in the (L) LE after this with some increase in antalgic gait.  Attempted additional trial of sidelying rotation after this which did not change increased symptoms.       Treatment Charges: Mins Units

## 2025-04-08 RX ORDER — IBUPROFEN 800 MG/1
800 TABLET, FILM COATED ORAL EVERY 8 HOURS PRN
Qty: 120 TABLET | Refills: 5 | Status: SHIPPED | OUTPATIENT
Start: 2025-04-08

## 2025-04-09 ENCOUNTER — HOSPITAL ENCOUNTER (OUTPATIENT)
Age: 50
Setting detail: THERAPIES SERIES
Discharge: HOME | End: 2025-04-09
Attending: NEUROLOGICAL SURGERY
Payer: COMMERCIAL

## 2025-04-09 NOTE — FLOWSHEET NOTE
[facility] Rehabilitation &  Therapy  [address], [address]  [facility] [address]  [phone]  F: [phone]     Physical Therapy CXL/NS    Date:  2025  Patient Name:  Eve Vázquez    :  1975  MRN: [id number]  Physician: Earnest Beltran                                     Insurance: [employer]; $3300.00 ded/$2094.62 left/20%;  VISITS   Medical Diagnosis: lumbar radiculopathy M54.18; (L) hip pain M25.552               Rehab Codes: low back pain M54.5; hip pain (L) M25.552  Onset Date: referral 3/24/25             Next 's appt.: PRN  Visit Count: 3/12                                Cancel/No Show: 1/0        Cancelled today- had to go out of town.  On scheduled for Wednesday of next week.    Electronically signed by:  Gumaro Elizalde PT

## 2025-04-16 ENCOUNTER — HOSPITAL ENCOUNTER (OUTPATIENT)
Age: 50
Setting detail: THERAPIES SERIES
Discharge: HOME | End: 2025-04-16
Attending: NEUROLOGICAL SURGERY
Payer: COMMERCIAL

## 2025-04-16 PROCEDURE — 97110 THERAPEUTIC EXERCISES: CPT

## 2025-04-16 PROCEDURE — 97140 MANUAL THERAPY 1/> REGIONS: CPT

## 2025-04-16 NOTE — FLOWSHEET NOTE
[facility] Rehabilitation &  Therapy  [address], [address]  [facility] [address]  [phone]  F: [phone]     Physical Therapy Daily Treatment Note    Date:  2025  Patient Name:  Eve Vázquez    :  1975  MRN: [id number]  Physician: Earnest Beltran                                     Insurance: [employer]; $3300.00 ded/$2094.62 left/20%;  VISITS   Medical Diagnosis: lumbar radiculopathy M54.18; (L) hip pain M25.552               Rehab Codes: low back pain M54.5; hip pain (L) M25.552  Onset Date: referral 3/24/25             Next 's appt.: PRN  Visit Count:                                 Cancel/No Show:     Reporting period 3/26/25 to 25    Subjective:    Pain:  [x] Yes  [] No   Location: low back, (L) gluteal region, to (L) lateral foot most distally            Pain Rating: (0-10 scale) 5/10  Pain altered Tx:  [] Yes  [] No  Action:  Comments:     Patient with constant symptoms to the (L) LE at some level with intermittent increase of radicular symptoms with standing, walking.  No significant change or improvement of symptoms thus far and does not exhibit direction of preference to reduce possible disc issue.  Due to presence of radicular symptoms unilaterally and to the distal lateral ankle would suspect possible L4-L5 or L5-S1 disc issue.  We have discussed postural decompression and lateral based stretching program to reduce possible lateral disc issue on (L) side but without significant improvement of symptoms.  Exhibits obvious shift in standing and sitting, moderate to large lateral shift depending on leg symptoms/soreness level     Objective:         Lumbar mobility:  Flexion to ankles  Extension 5 degrees pain in (L) lumbar/SI region     Repeated movement testing:  INITIALLY  Repeated flexion in standing- no effect  Repeated extension in standing- produces symptoms into the (L) LE; remains worse  Unable to get into prone position for

## 2025-04-23 ENCOUNTER — HOSPITAL ENCOUNTER (OUTPATIENT)
Age: 50
Setting detail: THERAPIES SERIES
Discharge: HOME | End: 2025-04-23
Attending: NEUROLOGICAL SURGERY
Payer: COMMERCIAL

## 2025-04-23 PROCEDURE — 97140 MANUAL THERAPY 1/> REGIONS: CPT

## 2025-04-23 PROCEDURE — 97110 THERAPEUTIC EXERCISES: CPT

## 2025-04-23 NOTE — PROGRESS NOTES
[facility] Rehabilitation &  Therapy  [address], [address]  [facility] [address]  [phone]  F: [phone]     Physical Therapy Daily Treatment Note/PROGRESS NOTE    Date:  2025  Patient Name:  Eve Vázquez    :  1975  MRN: [id number]  Physician: Earnest Beltran                                     Insurance: [employer]; $3300.00 ded/$2094.62 left/20%;  VISITS   Medical Diagnosis: lumbar radiculopathy M54.18; (L) hip pain M25.552               Rehab Codes: low back pain M54.5; hip pain (L) M25.552  Onset Date: referral 3/24/25             Next 's appt.: PRN  Visit Count:                                 Cancel/No Show: 1    Reporting period 3/26/25 to 25    Subjective:    Pain:  [x] Yes  [] No   Location: low back, (L) gluteal region, to (L) lateral foot most distally            Pain Rating: (0-10 scale) 5/10  Pain altered Tx:  [] Yes  [] No  Action:  Comments:  Patient noting continued constant symptoms to the (L) LE with intermittently worse symptoms with standing and walking.  No significant improvement of symptoms thus far and no direction of preference to reduce possible disc issue.  Presents as a possible L4-L5 or L5-S1 issue due to unilateral symptoms into the lower leg and to the ankle.  She tolerates postural decompression exercises and basic lateral based stretching exercises and general (L) hip/back stretching but does not get consistent reduction of symptoms with these exercises.  Continues to exhibit significant lateral shift in standing.    Objective:         Lumbar mobility:  Flexion to ankles  Extension 5 degrees pain in (L) lumbar/SI region     Repeated movement testing:  INITIALLY  Repeated flexion in standing- no effect  Repeated extension in standing- produces symptoms into the (L) LE; remains worse  Unable to get into prone position for repeated testing in prone.  Reviewed spinal decompression- sidelying with knees up, sitting

## 2025-05-05 ENCOUNTER — OFFICE VISIT (OUTPATIENT)
Age: 50
End: 2025-05-05
Payer: COMMERCIAL

## 2025-05-05 VITALS
WEIGHT: 186 LBS | BODY MASS INDEX: 30.99 KG/M2 | HEIGHT: 65 IN | SYSTOLIC BLOOD PRESSURE: 122 MMHG | DIASTOLIC BLOOD PRESSURE: 81 MMHG | HEART RATE: 79 BPM

## 2025-05-05 DIAGNOSIS — M25.552 LEFT HIP PAIN: Primary | ICD-10-CM

## 2025-05-05 DIAGNOSIS — M54.16 LUMBAR RADICULOPATHY: ICD-10-CM

## 2025-05-05 PROCEDURE — 99212 OFFICE O/P EST SF 10 MIN: CPT | Performed by: NEUROLOGICAL SURGERY

## 2025-05-05 NOTE — PROGRESS NOTES
[facility], [facility], [facility] NEUROSURGERY  [address], [address]  [place] [id number]  Dept: [phone]  Dept Fax: [phone]     Patient:  Eve Vázquez  YOB: 1975  Date: 5/5/25      Chief Complaint   Patient presents with    Follow-up     Lumbar-follow up MRI Denied, PT from 3/26/25-4/23/25 & XR @ [facility] ,need f/u appt stating results then I can request a reconsideration from insurance-pt trying to get MRI approved.-Discussed following up with physician due to lack of improvement with PT           HPI:     I had the pleasure of seeing this patient back in the office today in follow-up.  As you know she is a 49-year-old female who presents with ongoing complaints of back as well as left hip and lower extremity discomfort.  The patient recently underwent physical therapy.  She was able to tolerate 5 sessions of therapy after which the therapist stopped therapy due to no improvement.  The patient indicates that her level of discomfort is progressively worsening.  She has no new complaints at today's visit.  Thus at this point time we are going to rerequest that a lumbar MRI and left hip MRI be performed.  Once the studies are completed, I will have the patient return to my office for review these images as well as discuss any further potential treatment planning.  Again the physical therapist referred her back to my office as she was not tolerating physical therapy and her pain was not improving.        Physical Exam:      /81 (BP Site: Left Upper Arm, Patient Position: Sitting, BP Cuff Size: Medium Adult)   Pulse 79   Ht 1.651 m (5' 5\")   Wt 84.4 kg (186 lb)   LMP 06/15/2015 (Exact Date)   BMI 30.95 kg/m²         Assessment and Plan:     1. Left hip pain    2. Lumbar radiculopathy              Electronically signed by Earnest Beltran MD on 5/5/2025 at 2:51 PM    Please note that this chart was generated

## 2025-05-07 ENCOUNTER — TELEPHONE (OUTPATIENT)
Age: 50
End: 2025-05-07

## 2025-05-07 DIAGNOSIS — M54.16 LUMBAR RADICULOPATHY: ICD-10-CM

## 2025-05-07 DIAGNOSIS — M25.552 LEFT HIP PAIN: Primary | ICD-10-CM

## 2025-05-07 NOTE — TELEPHONE ENCOUNTER
Patient last seen in office on 5/5/25 per Dr Beltran plan to re-request that MRI lumbar spine and left hip MRI be performed. Once studies completed, pt to return to office for review (per Dr Beltran OV note).     MRI orders for lumbar spine and left hip entered into epic.

## 2025-05-07 NOTE — TELEPHONE ENCOUNTER
Patient called stating she needs a new Lumbar MRI order sent to [facility] since the previous order in [facility] was denied. We now have an approval. Can you please add a new MRI order?

## 2025-05-12 DIAGNOSIS — F90.2 ATTENTION DEFICIT HYPERACTIVITY DISORDER (ADHD), COMBINED TYPE: ICD-10-CM

## 2025-05-13 RX ORDER — DEXTROAMPHETAMINE SACCHARATE, AMPHETAMINE ASPARTATE, DEXTROAMPHETAMINE SULFATE AND AMPHETAMINE SULFATE 5; 5; 5; 5 MG/1; MG/1; MG/1; MG/1
20 TABLET ORAL DAILY
Qty: 30 TABLET | Refills: 0 | Status: SHIPPED | OUTPATIENT
Start: 2025-05-13 | End: 2025-06-12

## 2025-05-19 ENCOUNTER — PATIENT MESSAGE (OUTPATIENT)
Dept: PRIMARY CARE CLINIC | Age: 50
End: 2025-05-19

## 2025-05-19 DIAGNOSIS — M54.16 CHRONIC LUMBAR RADICULOPATHY: ICD-10-CM

## 2025-05-20 RX ORDER — OXYCODONE HYDROCHLORIDE 10 MG/1
10 TABLET ORAL EVERY 8 HOURS PRN
Qty: 90 TABLET | Refills: 0 | Status: SHIPPED | OUTPATIENT
Start: 2025-05-20 | End: 2025-06-19

## 2025-06-04 ENCOUNTER — OFFICE VISIT (OUTPATIENT)
Dept: PRIMARY CARE CLINIC | Age: 50
End: 2025-06-04
Payer: COMMERCIAL

## 2025-06-04 VITALS
RESPIRATION RATE: 16 BRPM | BODY MASS INDEX: 29.29 KG/M2 | DIASTOLIC BLOOD PRESSURE: 82 MMHG | WEIGHT: 176 LBS | OXYGEN SATURATION: 95 % | SYSTOLIC BLOOD PRESSURE: 112 MMHG | HEART RATE: 89 BPM

## 2025-06-04 DIAGNOSIS — F90.2 ATTENTION DEFICIT HYPERACTIVITY DISORDER (ADHD), COMBINED TYPE: ICD-10-CM

## 2025-06-04 DIAGNOSIS — J45.909 UNCOMPLICATED ASTHMA, UNSPECIFIED ASTHMA SEVERITY, UNSPECIFIED WHETHER PERSISTENT: ICD-10-CM

## 2025-06-04 DIAGNOSIS — R61 NIGHT SWEATS: ICD-10-CM

## 2025-06-04 DIAGNOSIS — M54.16 CHRONIC LUMBAR RADICULOPATHY: Primary | ICD-10-CM

## 2025-06-04 PROCEDURE — 99214 OFFICE O/P EST MOD 30 MIN: CPT | Performed by: NURSE PRACTITIONER

## 2025-06-04 SDOH — ECONOMIC STABILITY: FOOD INSECURITY: WITHIN THE PAST 12 MONTHS, YOU WORRIED THAT YOUR FOOD WOULD RUN OUT BEFORE YOU GOT MONEY TO BUY MORE.: NEVER TRUE

## 2025-06-04 SDOH — ECONOMIC STABILITY: FOOD INSECURITY: WITHIN THE PAST 12 MONTHS, THE FOOD YOU BOUGHT JUST DIDN'T LAST AND YOU DIDN'T HAVE MONEY TO GET MORE.: NEVER TRUE

## 2025-06-04 ASSESSMENT — ENCOUNTER SYMPTOMS
SORE THROAT: 0
VOMITING: 0
EYE DISCHARGE: 0
DIARRHEA: 0
SINUS PAIN: 0
BACK PAIN: 1
NAUSEA: 0
COUGH: 0
CHEST TIGHTNESS: 0
WHEEZING: 0
ABDOMINAL PAIN: 0
SINUS PRESSURE: 0
SHORTNESS OF BREATH: 0
EYE REDNESS: 0
TROUBLE SWALLOWING: 0
EYE ITCHING: 0

## 2025-06-04 ASSESSMENT — PATIENT HEALTH QUESTIONNAIRE - PHQ9
2. FEELING DOWN, DEPRESSED OR HOPELESS: NOT AT ALL
1. LITTLE INTEREST OR PLEASURE IN DOING THINGS: NOT AT ALL
SUM OF ALL RESPONSES TO PHQ QUESTIONS 1-9: 0

## 2025-06-04 NOTE — PROGRESS NOTES
heard.  Pulmonary:      Effort: Pulmonary effort is normal. No respiratory distress.      Breath sounds: Normal breath sounds. No wheezing.   Musculoskeletal:         General: Normal range of motion.      Cervical back: Normal range of motion and neck supple.   Skin:     General: Skin is warm and dry.      Capillary Refill: Capillary refill takes less than 2 seconds.   Neurological:      General: No focal deficit present.      Mental Status: She is alert and oriented to person, place, and time.      Motor: No weakness.      Coordination: Coordination normal.      Gait: Gait normal.   Psychiatric:         Mood and Affect: Mood normal.         Behavior: Behavior normal.         Thought Content: Thought content normal.           :       Diagnosis Orders   1. Chronic lumbar radiculopathy        2. Attention deficit hyperactivity disorder (ADHD), combined type        3. Night sweats        4. Uncomplicated asthma, unspecified asthma severity, unspecified whether persistent                  :   Assessment & Plan     Assessment & Plan  1. Back pain.  - Constant pain due to 3 bulging discs, confirmed by MRI.  - Oxycodone 10 mg taken 3 times a day as needed for pain management.  - Follow-up with back surgeon scheduled for 06/06/2025 to discuss potential surgical options.  - MRI findings indicate 6.5 mm impingement, Type II endplate changes.    2. Night sweats.  - Severe night sweats likely due to menopausal hormonal fluctuations.  - Black cohosh recommended as a potential treatment option.  - Hormonal replacement therapy, including estrogen, discussed as a possible solution.  - Veozah, a nonhormonal alternative, suggested contingent on insurance coverage.    3. asthma  - Breathing reported as okay.  - Currently using Spiriva, symbicort  for management.    4. Weight loss.  - Weight decreased by approximately 14 pounds.  - Weight loss noted during physical examination.  - Discussion about the impact of lifestyle changes on

## 2025-06-06 ENCOUNTER — OFFICE VISIT (OUTPATIENT)
Age: 50
End: 2025-06-06
Payer: COMMERCIAL

## 2025-06-06 VITALS
SYSTOLIC BLOOD PRESSURE: 111 MMHG | HEIGHT: 65 IN | BODY MASS INDEX: 29.32 KG/M2 | DIASTOLIC BLOOD PRESSURE: 76 MMHG | HEART RATE: 75 BPM | WEIGHT: 176 LBS

## 2025-06-06 DIAGNOSIS — M51.26 LUMBAR DISC HERNIATION: Primary | ICD-10-CM

## 2025-06-06 DIAGNOSIS — M54.16 LUMBAR RADICULOPATHY: ICD-10-CM

## 2025-06-06 DIAGNOSIS — M25.552 LEFT HIP PAIN: ICD-10-CM

## 2025-06-06 DIAGNOSIS — M51.369 DEGENERATION OF INTERVERTEBRAL DISC OF LUMBAR REGION, UNSPECIFIED WHETHER PAIN PRESENT: ICD-10-CM

## 2025-06-06 PROCEDURE — 99214 OFFICE O/P EST MOD 30 MIN: CPT | Performed by: PHYSICIAN ASSISTANT

## 2025-06-06 NOTE — PROGRESS NOTES
[facility], [facility] NEUROSCIENCE [place], [facility] NEUROSURGERY  [address], [address]  [place] [id number]  Dept: [phone]  Dept Fax: [phone]     Patient:  Eve Vázquez  YOB: 1975  Date: 6/6/25      Chief Complaint   Patient presents with    Follow-up     Lumbar- follow up, MRI Lumbar@[facility] 05/23/2025, MRI Left Hip@[facility] 05/23/2025           HPI:     I had the pleasure of seeing this patient in the office today in follow-up.  As you know she is a 49-year-old female who presents today with a longstanding history of pain which has become worse over the past several months.  She describes pain originate in the lower back coursing the left buttock and hip sparing the left thigh region and then significant discomfort over the posterior lateral calf region ending in her left ankle.  Denies pain in the right lower extremity.  She has worked with formal physical therapy for this discomfort which has led to no significant improvement, in fact she states therapy made her pain significantly worse.  We did have her undergo a left hip MRI as well as lumbar MRI.  She presents today to review the study.  Left hip MRI was unremarkable for the patient's stated age.  Lumbar MRI demonstrates disc herniation on the left at the L4-5 level resulting in severe foraminal recess stenosis with nerve root impingement.  Postoperative changes are noted on the left at the L4-5 level.  She does have a degree of early moderate degenerative disc disease seen isolated at the L4-5 level.  She indicates her pain is present on a daily basis and quite debilitating to her despite time as well as conservative treatment.  Further treatment options were discussed conservative measures versus surgical intervention. Conservative measures discussed included medications, physical therapy, and/or pain management for epidural steroid injections. All of the above treatment

## 2025-06-10 RX ORDER — SODIUM CHLORIDE, SODIUM LACTATE, POTASSIUM CHLORIDE, CALCIUM CHLORIDE 600; 310; 30; 20 MG/100ML; MG/100ML; MG/100ML; MG/100ML
INJECTION, SOLUTION INTRAVENOUS CONTINUOUS
Status: CANCELLED | OUTPATIENT
Start: 2025-06-10

## 2025-06-10 NOTE — DISCHARGE INSTRUCTIONS
Preoperative Instructions:    Stop eating solid foods at midnight the night prior to surgery.    Stop drinking clear liquids at midnight the night prior to surgery.    Arrive at the surgery center (Entrance B) by 6:00 on 6/18/2025  (or as directed by your surgeon's office).    Please stop any blood thinning medications as directed by your surgeon or prescribing physician. Failure to stop certain medications may interfere with your scheduled surgery.    These may include:  Aspirin, Warfarin (Coumadin), Clopidogrel (Plavix), Ibuprofen (Motrin, Advil), Naproxen (Aleve), Meloxicam (Mobic), Celecoxib (Celebrex), Eliquis, Pradaxa, Xarelto, Effient, Fish Oil, Herbal supplements.  Advil/ibuprofen - 7 days    You may continue the rest of your medications through the night before surgery unless instructed otherwise.    Please take only the following medication(s) the day of surgery with a small sip of water:  Oxycodone if needed    Please use and bring inhalers the day of surgery, if applies.  Please bring CPAP the day of surgery, if applies.    PLEASE NOTE:  THE ABOVE (IF ANY) DISCONTINUED MEDS MAY ONLY BE FROM   \"CLEANING UP\" THE MED LIST AND WERE NOT ACTUALLY CANCELLED; SEE CHART FOR DETAILS AND ALWAYS CHECK WITH PRESCRIBING PROVIDER BEFORE DISCONTINUING ANY MEDICATIONS        REMINDERS:  ** If you are going home the day of your procedure, you will need a friend or family member to drive you home after your procedure.  Your  must be 18 years of age or older and able to sign off on your discharge instructions.  Taxi cabs or any form of public transportation is not acceptable.    ** It is preferable that the friend or family member stay at the hospital throughout your procedure.  ** If you are going home the same day as your procedure, someone must remain with you for the first 24 hours after your surgery if you receive anesthesia or sedation.  If you do not have someone to stay with you, your procedure may be

## 2025-06-11 ENCOUNTER — HOSPITAL ENCOUNTER (OUTPATIENT)
Dept: PREADMISSION TESTING | Age: 50
Discharge: HOME | End: 2025-06-15
Payer: COMMERCIAL

## 2025-06-11 VITALS
TEMPERATURE: 98.2 F | HEART RATE: 62 BPM | BODY MASS INDEX: 29.16 KG/M2 | SYSTOLIC BLOOD PRESSURE: 119 MMHG | RESPIRATION RATE: 20 BRPM | WEIGHT: 175 LBS | HEIGHT: 65 IN | DIASTOLIC BLOOD PRESSURE: 81 MMHG | OXYGEN SATURATION: 96 %

## 2025-06-11 LAB
ANION GAP SERPL CALCULATED.3IONS-SCNC: 12 MMOL/L (ref 9–16)
BUN SERPL-MCNC: 15 MG/DL (ref 6–20)
CALCIUM SERPL-MCNC: 9.9 MG/DL (ref 8.6–10.4)
CHLORIDE SERPL-SCNC: 105 MMOL/L (ref 98–107)
CO2 SERPL-SCNC: 26 MMOL/L (ref 20–31)
CREAT SERPL-MCNC: 0.6 MG/DL (ref 0.6–0.9)
EKG ATRIAL RATE: 56 BPM
EKG P AXIS: 62 DEGREES
EKG P-R INTERVAL: 194 MS
EKG Q-T INTERVAL: 432 MS
EKG QRS DURATION: 100 MS
EKG QTC CALCULATION (BAZETT): 416 MS
EKG R AXIS: 66 DEGREES
EKG T AXIS: 45 DEGREES
EKG VENTRICULAR RATE: 56 BPM
ERYTHROCYTE [DISTWIDTH] IN BLOOD BY AUTOMATED COUNT: 14 % (ref 11.8–14.4)
GFR, ESTIMATED: >90 ML/MIN/1.73M2
GLUCOSE SERPL-MCNC: 90 MG/DL (ref 74–99)
HCT VFR BLD AUTO: 45.3 % (ref 36.3–47.1)
HGB BLD-MCNC: 15.2 G/DL (ref 11.9–15.1)
MCH RBC QN AUTO: 31.3 PG (ref 25.2–33.5)
MCHC RBC AUTO-ENTMCNC: 33.6 G/DL (ref 28.4–34.8)
MCV RBC AUTO: 93.4 FL (ref 82.6–102.9)
NRBC BLD-RTO: 0 PER 100 WBC
PLATELET # BLD AUTO: 319 K/UL (ref 138–453)
PMV BLD AUTO: 9.5 FL (ref 8.1–13.5)
POTASSIUM SERPL-SCNC: 4 MMOL/L (ref 3.7–5.3)
RBC # BLD AUTO: 4.85 M/UL (ref 3.95–5.11)
SODIUM SERPL-SCNC: 143 MMOL/L (ref 136–145)
WBC OTHER # BLD: 11 K/UL (ref 3.5–11.3)

## 2025-06-11 PROCEDURE — 93010 ELECTROCARDIOGRAM REPORT: CPT | Performed by: INTERNAL MEDICINE

## 2025-06-11 PROCEDURE — 93005 ELECTROCARDIOGRAM TRACING: CPT | Performed by: STUDENT IN AN ORGANIZED HEALTH CARE EDUCATION/TRAINING PROGRAM

## 2025-06-11 PROCEDURE — 85027 COMPLETE CBC AUTOMATED: CPT

## 2025-06-11 PROCEDURE — 36415 COLL VENOUS BLD VENIPUNCTURE: CPT

## 2025-06-11 PROCEDURE — 80048 BASIC METABOLIC PNL TOTAL CA: CPT

## 2025-06-11 ASSESSMENT — PAIN SCALES - GENERAL: PAINLEVEL_OUTOF10: 8

## 2025-06-11 ASSESSMENT — PAIN DESCRIPTION - FREQUENCY: FREQUENCY: INTERMITTENT

## 2025-06-11 ASSESSMENT — PAIN DESCRIPTION - LOCATION: LOCATION: HIP

## 2025-06-11 ASSESSMENT — PAIN - FUNCTIONAL ASSESSMENT: PAIN_FUNCTIONAL_ASSESSMENT: PREVENTS OR INTERFERES SOME ACTIVE ACTIVITIES AND ADLS

## 2025-06-11 ASSESSMENT — PAIN DESCRIPTION - ORIENTATION: ORIENTATION: LEFT

## 2025-06-11 ASSESSMENT — PAIN DESCRIPTION - DESCRIPTORS: DESCRIPTORS: ACHING;SHARP

## 2025-06-11 NOTE — PROGRESS NOTES
Anesthesia Focused Assessment      STOP-BANG Sleep Apnea Questionnaire    SNORE loudly (heard through closed doors)?   No  TIRED, fatigued, sleepy during daytime?    No  OBSERVED stopping breathing during sleep?   No  High blood PRESSURE being treated?    No    BMI over 35?        No  AGE over 50?        Yes  NECK circumference over 16\"?     No  GENDER (male)?       No             Total 1  High risk 5-8  Intermediate risk 3-4  Low risk 0-2    Obstructive Sleep Apnea: denies  If YES, machine used: no     Type 1 DM:   no  T2DM:  no    Coronary Artery Disease:  no  Hypertension:  no    Active smoker:  1 ppd for 36 years  Drinks alcohol:  nightly  Recreational drugs: gummies for sleep    Dentition: molar crowns    Defib / AICD / Pacemaker: no      Renal Failure/dialysis:  no    Patient was evaluated in PAT & anesthesia guidelines were applied.   NPO guidelines, medication instructions and scheduled arrival time were reviewed with patient.  I advised patient to please contact the surgeon's office, ahead of time if possible, if any new signs or symptoms of illness, infection, rash, etc    Hx of anesthesia complications:  patient had coughing postop in the past, says has asthma and chronic nasal/sinus drainage that worsens with laying down - thinks this played a role.  Family hx of anesthesia complications:  no                                                                                                                       Patient is overall active, without cardiac or pulmonary complaints, METS greater than 4.  She will use and bring her inhaler the AM of surgery.    VERA BATISTA PA-C  6/11/25  10:33 AM

## 2025-06-11 NOTE — H&P
History and Physical    Pt Name: Eve Vázquez  MRN: [id number]  YOB: 1975  Date of evaluation: 6/11/2025    SUBJECTIVE:   History of Chief Complaint:    Patient presents for PAT appointment. She reports lower back pain, left sided, with LLE symptoms as well.  She says that she had lumbar spine surgery in 2018, did well for quite some time postop until she had a fall two years ago.  She says that she has had the above symptoms since then.  She has failed conservative treatment and has been scheduled for REDO L4-5 LUMBAR LAMINECTOMY, NERVE ROOT FORAMINOTOMY, POSSIBLE  DISCECTOMY.   Past Medical History    has a past medical history of Acute deep vein thrombosis (DVT) of right lower extremity (HCC), Acute internal jugular vein embolism (HCC), Asthma, Bronchitis, Deep vein thrombosis (DVT) of non-extremity vein, History of cardiac murmur, History of DVT (deep vein thrombosis), Hx of blood clots, Internal jugular (IJ) vein thromboembolism, chronic (HCC), Snores, Under care of team, and Wears glasses.  Past Surgical History   has a past surgical history that includes Appendectomy; Tonsillectomy; Hysterectomy; Lumbar spine surgery; Colonoscopy; and Colonoscopy (N/A, 04/19/2024).  Medications  Prior to Admission medications    Medication Sig Start Date End Date Taking? Authorizing Provider   oxyCODONE HCl (OXY-IR) 10 MG immediate release tablet Take 1 tablet by mouth every 8 hours as needed for Pain for up to 30 days. Intended supply: 30 days Max Daily Amount: 30 mg 5/20/25 6/19/25 Yes Renetta Og APRN - CNP   amphetamine-dextroamphetamine (ADDERALL, 20MG,) 20 MG tablet Take 1 tablet by mouth daily for 30 days. 5/13/25 6/12/25 Yes Renetta Og APRN - CNP   ibuprofen (ADVIL;MOTRIN) 800 MG tablet Take 1 tablet by mouth every 8 hours as needed for Pain 4/8/25  Yes Renetta Og APRN - CNP   sennosides-docusate sodium (SENOKOT-S) 8.6-50 MG tablet Take 1-2 tablets daily until desired bm, then 1 daily    GENITOURINARY/RENAL:   negative for incontinence     MUSCULOSKELETAL:   See HPI   NEUROLOGICAL:   See HPI    PSYCHIATRIC:   negative for anxiety         OBJECTIVE:   VITALS:  height is 1.651 m (5' 5\") and weight is 79.4 kg (175 lb). Her oral temperature is 98.2 °F (36.8 °C). Her blood pressure is 119/81 and her pulse is 62. Her respiration is 20 and oxygen saturation is 96%.   CONSTITUTIONAL:alert & cooperative, no acute distress.  Pleasant and talkative.  SKIN:  Brief inspection of skin, Warm and dry, no rashes on exposed areas of skin   HEAD:  Normocephalic, atraumatic   EYES: EOMs intact.    EARS:  Hearing grossly WNL.    NOSE:  Nares patent.  No rhinorrhea   MOUTH/THROAT:  benign  NECK:good ROM   LUNGS: Clear to auscultation bilaterally, no wheezes.  CARDIOVASCULAR: Heart sounds are normal.  Regular rate and rhythm.  ABDOMEN: non distended.  EXTREMITIES: no edema bilateral lower extremities.    Testing:   EK25  Labs pending: drawn 2025     IMPRESSIONS:      has a past medical history of Acute deep vein thrombosis (DVT) of right lower extremity (HCC) (2017), Acute internal jugular vein embolism (HCC) (2017), Asthma, Bronchitis, Deep vein thrombosis (DVT) of non-extremity vein (2021), History of cardiac murmur, History of DVT (deep vein thrombosis), blood clots, Internal jugular (IJ) vein thromboembolism, chronic (HCC) (2017), Snores, Under care of team, and Wears glasses.   PLANS:   REDO L4-5 LUMBAR LAMINECTOMY, NERVE ROOT FORAMINOTOMY, POSSIBLE  DISCECTOMY     VERA BATISTA PA-C  Electronically signed 2025 at 11:09 AM

## 2025-06-11 NOTE — H&P (VIEW-ONLY)
History and Physical    Pt Name: Eve Vázquez  MRN: [id number]  YOB: 1975  Date of evaluation: 6/11/2025    SUBJECTIVE:   History of Chief Complaint:    Patient presents for PAT appointment. She reports lower back pain, left sided, with LLE symptoms as well.  She says that she had lumbar spine surgery in 2018, did well for quite some time postop until she had a fall two years ago.  She says that she has had the above symptoms since then.  She has failed conservative treatment and has been scheduled for REDO L4-5 LUMBAR LAMINECTOMY, NERVE ROOT FORAMINOTOMY, POSSIBLE  DISCECTOMY.   Past Medical History    has a past medical history of Acute deep vein thrombosis (DVT) of right lower extremity (HCC), Acute internal jugular vein embolism (HCC), Asthma, Bronchitis, Deep vein thrombosis (DVT) of non-extremity vein, History of cardiac murmur, History of DVT (deep vein thrombosis), Hx of blood clots, Internal jugular (IJ) vein thromboembolism, chronic (HCC), Snores, Under care of team, and Wears glasses.  Past Surgical History   has a past surgical history that includes Appendectomy; Tonsillectomy; Hysterectomy; Lumbar spine surgery; Colonoscopy; and Colonoscopy (N/A, 04/19/2024).  Medications  Prior to Admission medications    Medication Sig Start Date End Date Taking? Authorizing Provider   oxyCODONE HCl (OXY-IR) 10 MG immediate release tablet Take 1 tablet by mouth every 8 hours as needed for Pain for up to 30 days. Intended supply: 30 days Max Daily Amount: 30 mg 5/20/25 6/19/25 Yes Renetta Og APRN - CNP   amphetamine-dextroamphetamine (ADDERALL, 20MG,) 20 MG tablet Take 1 tablet by mouth daily for 30 days. 5/13/25 6/12/25 Yes Renetta Og APRN - CNP   ibuprofen (ADVIL;MOTRIN) 800 MG tablet Take 1 tablet by mouth every 8 hours as needed for Pain 4/8/25  Yes Renetta Og APRN - CNP   sennosides-docusate sodium (SENOKOT-S) 8.6-50 MG tablet Take 1-2 tablets daily until desired bm, then 1 daily

## 2025-06-18 ENCOUNTER — APPOINTMENT (OUTPATIENT)
Dept: GENERAL RADIOLOGY | Age: 50
End: 2025-06-18
Attending: NEUROLOGICAL SURGERY
Payer: COMMERCIAL

## 2025-06-18 ENCOUNTER — ANESTHESIA (OUTPATIENT)
Dept: OPERATING ROOM | Age: 50
End: 2025-06-18
Payer: COMMERCIAL

## 2025-06-18 ENCOUNTER — ANESTHESIA EVENT (OUTPATIENT)
Dept: OPERATING ROOM | Age: 50
End: 2025-06-18
Payer: COMMERCIAL

## 2025-06-18 ENCOUNTER — HOSPITAL ENCOUNTER (OUTPATIENT)
Age: 50
Setting detail: OUTPATIENT SURGERY
Discharge: HOME | End: 2025-06-18
Attending: NEUROLOGICAL SURGERY | Admitting: NEUROLOGICAL SURGERY
Payer: COMMERCIAL

## 2025-06-18 VITALS
HEART RATE: 60 BPM | TEMPERATURE: 98.1 F | DIASTOLIC BLOOD PRESSURE: 86 MMHG | WEIGHT: 175 LBS | HEIGHT: 65 IN | SYSTOLIC BLOOD PRESSURE: 137 MMHG | RESPIRATION RATE: 12 BRPM | BODY MASS INDEX: 29.16 KG/M2 | OXYGEN SATURATION: 96 %

## 2025-06-18 DIAGNOSIS — M51.26 DISPLACEMENT OF LUMBAR INTERVERTEBRAL DISC WITHOUT MYELOPATHY: Primary | ICD-10-CM

## 2025-06-18 PROCEDURE — 3700000000 HC ANESTHESIA ATTENDED CARE: Performed by: NEUROLOGICAL SURGERY

## 2025-06-18 PROCEDURE — 72020 X-RAY EXAM OF SPINE 1 VIEW: CPT

## 2025-06-18 PROCEDURE — 6360000002 HC RX W HCPCS: Performed by: NEUROLOGICAL SURGERY

## 2025-06-18 PROCEDURE — 7100000000 HC PACU RECOVERY - FIRST 15 MIN: Performed by: NEUROLOGICAL SURGERY

## 2025-06-18 PROCEDURE — 6360000002 HC RX W HCPCS: Performed by: ANESTHESIOLOGY

## 2025-06-18 PROCEDURE — 6360000002 HC RX W HCPCS

## 2025-06-18 PROCEDURE — 2500000003 HC RX 250 WO HCPCS: Performed by: NEUROLOGICAL SURGERY

## 2025-06-18 PROCEDURE — 6370000000 HC RX 637 (ALT 250 FOR IP): Performed by: NEUROLOGICAL SURGERY

## 2025-06-18 PROCEDURE — 2709999900 HC NON-CHARGEABLE SUPPLY: Performed by: NEUROLOGICAL SURGERY

## 2025-06-18 PROCEDURE — 2500000003 HC RX 250 WO HCPCS

## 2025-06-18 PROCEDURE — 7100000011 HC PHASE II RECOVERY - ADDTL 15 MIN: Performed by: NEUROLOGICAL SURGERY

## 2025-06-18 PROCEDURE — 7100000010 HC PHASE II RECOVERY - FIRST 15 MIN: Performed by: NEUROLOGICAL SURGERY

## 2025-06-18 PROCEDURE — 3700000001 HC ADD 15 MINUTES (ANESTHESIA): Performed by: NEUROLOGICAL SURGERY

## 2025-06-18 PROCEDURE — 2720000010 HC SURG SUPPLY STERILE: Performed by: NEUROLOGICAL SURGERY

## 2025-06-18 PROCEDURE — 3600000004 HC SURGERY LEVEL 4 BASE: Performed by: NEUROLOGICAL SURGERY

## 2025-06-18 PROCEDURE — 3600000014 HC SURGERY LEVEL 4 ADDTL 15MIN: Performed by: NEUROLOGICAL SURGERY

## 2025-06-18 PROCEDURE — 7100000001 HC PACU RECOVERY - ADDTL 15 MIN: Performed by: NEUROLOGICAL SURGERY

## 2025-06-18 RX ORDER — HYDRALAZINE HYDROCHLORIDE 20 MG/ML
10 INJECTION INTRAMUSCULAR; INTRAVENOUS
Status: DISCONTINUED | OUTPATIENT
Start: 2025-06-18 | End: 2025-06-18 | Stop reason: HOSPADM

## 2025-06-18 RX ORDER — DIPHENHYDRAMINE HYDROCHLORIDE 50 MG/ML
12.5 INJECTION, SOLUTION INTRAMUSCULAR; INTRAVENOUS
Status: DISCONTINUED | OUTPATIENT
Start: 2025-06-18 | End: 2025-06-18 | Stop reason: HOSPADM

## 2025-06-18 RX ORDER — CEFEPIME HYDROCHLORIDE 2 G/1
INJECTION, POWDER, FOR SOLUTION INTRAVENOUS
Status: DISCONTINUED | OUTPATIENT
Start: 2025-06-18 | End: 2025-06-18

## 2025-06-18 RX ORDER — SODIUM CHLORIDE, SODIUM LACTATE, POTASSIUM CHLORIDE, CALCIUM CHLORIDE 600; 310; 30; 20 MG/100ML; MG/100ML; MG/100ML; MG/100ML
INJECTION, SOLUTION INTRAVENOUS CONTINUOUS
Status: DISCONTINUED | OUTPATIENT
Start: 2025-06-18 | End: 2025-06-18 | Stop reason: HOSPADM

## 2025-06-18 RX ORDER — LORAZEPAM 2 MG/ML
0.5 INJECTION INTRAMUSCULAR
Status: DISCONTINUED | OUTPATIENT
Start: 2025-06-18 | End: 2025-06-18 | Stop reason: HOSPADM

## 2025-06-18 RX ORDER — ONDANSETRON 2 MG/ML
INJECTION INTRAMUSCULAR; INTRAVENOUS
Status: DISCONTINUED | OUTPATIENT
Start: 2025-06-18 | End: 2025-06-18 | Stop reason: SDUPTHER

## 2025-06-18 RX ORDER — SODIUM CHLORIDE 0.9 % (FLUSH) 0.9 %
5-40 SYRINGE (ML) INJECTION PRN
Status: DISCONTINUED | OUTPATIENT
Start: 2025-06-18 | End: 2025-06-18 | Stop reason: HOSPADM

## 2025-06-18 RX ORDER — CEFAZOLIN SODIUM 1 G/3ML
INJECTION, POWDER, FOR SOLUTION INTRAMUSCULAR; INTRAVENOUS
Status: DISCONTINUED | OUTPATIENT
Start: 2025-06-18 | End: 2025-06-18 | Stop reason: SDUPTHER

## 2025-06-18 RX ORDER — OXYCODONE HYDROCHLORIDE 5 MG/1
5 TABLET ORAL PRN
Status: DISCONTINUED | OUTPATIENT
Start: 2025-06-18 | End: 2025-06-18 | Stop reason: HOSPADM

## 2025-06-18 RX ORDER — NALOXONE HYDROCHLORIDE 0.4 MG/ML
INJECTION, SOLUTION INTRAMUSCULAR; INTRAVENOUS; SUBCUTANEOUS PRN
Status: DISCONTINUED | OUTPATIENT
Start: 2025-06-18 | End: 2025-06-18 | Stop reason: HOSPADM

## 2025-06-18 RX ORDER — BUPIVACAINE HYDROCHLORIDE AND EPINEPHRINE 2.5; 5 MG/ML; UG/ML
INJECTION, SOLUTION EPIDURAL; INFILTRATION; INTRACAUDAL; PERINEURAL PRN
Status: DISCONTINUED | OUTPATIENT
Start: 2025-06-18 | End: 2025-06-18 | Stop reason: HOSPADM

## 2025-06-18 RX ORDER — OXYCODONE AND ACETAMINOPHEN 5; 325 MG/1; MG/1
1 TABLET ORAL EVERY 4 HOURS PRN
Qty: 42 TABLET | Refills: 0 | Status: SHIPPED | OUTPATIENT
Start: 2025-06-18 | End: 2025-06-25

## 2025-06-18 RX ORDER — DROPERIDOL 2.5 MG/ML
0.62 INJECTION, SOLUTION INTRAMUSCULAR; INTRAVENOUS
Status: DISCONTINUED | OUTPATIENT
Start: 2025-06-18 | End: 2025-06-18 | Stop reason: HOSPADM

## 2025-06-18 RX ORDER — OXYCODONE HYDROCHLORIDE 5 MG/1
10 TABLET ORAL PRN
Status: DISCONTINUED | OUTPATIENT
Start: 2025-06-18 | End: 2025-06-18 | Stop reason: HOSPADM

## 2025-06-18 RX ORDER — FENTANYL CITRATE 50 UG/ML
INJECTION, SOLUTION INTRAMUSCULAR; INTRAVENOUS
Status: DISCONTINUED | OUTPATIENT
Start: 2025-06-18 | End: 2025-06-18 | Stop reason: SDUPTHER

## 2025-06-18 RX ORDER — MIDAZOLAM HYDROCHLORIDE 1 MG/ML
INJECTION, SOLUTION INTRAMUSCULAR; INTRAVENOUS
Status: DISCONTINUED | OUTPATIENT
Start: 2025-06-18 | End: 2025-06-18 | Stop reason: SDUPTHER

## 2025-06-18 RX ORDER — LIDOCAINE HYDROCHLORIDE AND EPINEPHRINE 10; 10 MG/ML; UG/ML
INJECTION, SOLUTION INFILTRATION; PERINEURAL PRN
Status: DISCONTINUED | OUTPATIENT
Start: 2025-06-18 | End: 2025-06-18 | Stop reason: HOSPADM

## 2025-06-18 RX ORDER — SODIUM CHLORIDE 0.9 % (FLUSH) 0.9 %
5-40 SYRINGE (ML) INJECTION EVERY 12 HOURS SCHEDULED
Status: DISCONTINUED | OUTPATIENT
Start: 2025-06-18 | End: 2025-06-18 | Stop reason: HOSPADM

## 2025-06-18 RX ORDER — FENTANYL CITRATE 50 UG/ML
25 INJECTION, SOLUTION INTRAMUSCULAR; INTRAVENOUS EVERY 5 MIN PRN
Status: DISCONTINUED | OUTPATIENT
Start: 2025-06-18 | End: 2025-06-18 | Stop reason: HOSPADM

## 2025-06-18 RX ORDER — TIZANIDINE 2 MG/1
2 TABLET ORAL 3 TIMES DAILY PRN
Qty: 40 TABLET | Refills: 0 | Status: SHIPPED | OUTPATIENT
Start: 2025-06-18 | End: 2025-07-03

## 2025-06-18 RX ORDER — DEXAMETHASONE SODIUM PHOSPHATE 4 MG/ML
INJECTION, SOLUTION INTRA-ARTICULAR; INTRALESIONAL; INTRAMUSCULAR; INTRAVENOUS; SOFT TISSUE
Status: DISCONTINUED | OUTPATIENT
Start: 2025-06-18 | End: 2025-06-18 | Stop reason: SDUPTHER

## 2025-06-18 RX ORDER — PROPOFOL 10 MG/ML
INJECTION, EMULSION INTRAVENOUS
Status: DISCONTINUED | OUTPATIENT
Start: 2025-06-18 | End: 2025-06-18 | Stop reason: SDUPTHER

## 2025-06-18 RX ORDER — PROCHLORPERAZINE EDISYLATE 5 MG/ML
5 INJECTION INTRAMUSCULAR; INTRAVENOUS
Status: DISCONTINUED | OUTPATIENT
Start: 2025-06-18 | End: 2025-06-18 | Stop reason: HOSPADM

## 2025-06-18 RX ORDER — SODIUM CHLORIDE 9 MG/ML
INJECTION, SOLUTION INTRAVENOUS PRN
Status: DISCONTINUED | OUTPATIENT
Start: 2025-06-18 | End: 2025-06-18 | Stop reason: HOSPADM

## 2025-06-18 RX ORDER — LIDOCAINE HYDROCHLORIDE 10 MG/ML
INJECTION, SOLUTION EPIDURAL; INFILTRATION; INTRACAUDAL; PERINEURAL
Status: DISCONTINUED | OUTPATIENT
Start: 2025-06-18 | End: 2025-06-18 | Stop reason: SDUPTHER

## 2025-06-18 RX ORDER — ROCURONIUM BROMIDE 10 MG/ML
INJECTION, SOLUTION INTRAVENOUS
Status: DISCONTINUED | OUTPATIENT
Start: 2025-06-18 | End: 2025-06-18 | Stop reason: SDUPTHER

## 2025-06-18 RX ORDER — LABETALOL HYDROCHLORIDE 5 MG/ML
10 INJECTION, SOLUTION INTRAVENOUS
Status: DISCONTINUED | OUTPATIENT
Start: 2025-06-18 | End: 2025-06-18 | Stop reason: HOSPADM

## 2025-06-18 RX ORDER — MAGNESIUM HYDROXIDE 1200 MG/15ML
LIQUID ORAL CONTINUOUS PRN
Status: DISCONTINUED | OUTPATIENT
Start: 2025-06-18 | End: 2025-06-18 | Stop reason: HOSPADM

## 2025-06-18 RX ADMIN — FENTANYL CITRATE 50 MCG: 50 INJECTION, SOLUTION INTRAMUSCULAR; INTRAVENOUS at 08:57

## 2025-06-18 RX ADMIN — CEFAZOLIN 2 G: 1 INJECTION, POWDER, FOR SOLUTION INTRAMUSCULAR; INTRAVENOUS at 08:10

## 2025-06-18 RX ADMIN — FENTANYL CITRATE 100 MCG: 50 INJECTION, SOLUTION INTRAMUSCULAR; INTRAVENOUS at 08:03

## 2025-06-18 RX ADMIN — ROCURONIUM BROMIDE 15 MG: 10 INJECTION INTRAVENOUS at 08:51

## 2025-06-18 RX ADMIN — MIDAZOLAM 2 MG: 1 INJECTION INTRAMUSCULAR; INTRAVENOUS at 07:56

## 2025-06-18 RX ADMIN — PROPOFOL 160 MG: 10 INJECTION, EMULSION INTRAVENOUS at 08:03

## 2025-06-18 RX ADMIN — FENTANYL CITRATE 50 MCG: 50 INJECTION, SOLUTION INTRAMUSCULAR; INTRAVENOUS at 09:33

## 2025-06-18 RX ADMIN — ROCURONIUM BROMIDE 50 MG: 10 INJECTION INTRAVENOUS at 08:03

## 2025-06-18 RX ADMIN — ONDANSETRON 4 MG: 2 INJECTION INTRAMUSCULAR; INTRAVENOUS at 09:11

## 2025-06-18 RX ADMIN — HYDROMORPHONE HYDROCHLORIDE 0.5 MG: 1 INJECTION, SOLUTION INTRAMUSCULAR; INTRAVENOUS; SUBCUTANEOUS at 10:05

## 2025-06-18 RX ADMIN — DEXAMETHASONE SODIUM PHOSPHATE 10 MG: 4 INJECTION, SOLUTION INTRAMUSCULAR; INTRAVENOUS at 08:25

## 2025-06-18 RX ADMIN — LIDOCAINE HYDROCHLORIDE 50 MG: 10 INJECTION, SOLUTION EPIDURAL; INFILTRATION; INTRACAUDAL; PERINEURAL at 08:03

## 2025-06-18 RX ADMIN — HYDROMORPHONE HYDROCHLORIDE 0.5 MG: 1 INJECTION, SOLUTION INTRAMUSCULAR; INTRAVENOUS; SUBCUTANEOUS at 09:46

## 2025-06-18 RX ADMIN — SUGAMMADEX 200 MG: 100 INJECTION, SOLUTION INTRAVENOUS at 09:24

## 2025-06-18 ASSESSMENT — PAIN DESCRIPTION - LOCATION
LOCATION: BACK

## 2025-06-18 ASSESSMENT — PAIN DESCRIPTION - DESCRIPTORS
DESCRIPTORS: ACHING
DESCRIPTORS: ACHING;DISCOMFORT
DESCRIPTORS: DISCOMFORT
DESCRIPTORS: DISCOMFORT
DESCRIPTORS: ACHING;SHARP;STABBING

## 2025-06-18 ASSESSMENT — PAIN SCALES - GENERAL
PAINLEVEL_OUTOF10: 6
PAINLEVEL_OUTOF10: 7
PAINLEVEL_OUTOF10: 7
PAINLEVEL_OUTOF10: 4

## 2025-06-18 ASSESSMENT — PAIN DESCRIPTION - ORIENTATION
ORIENTATION: LOWER
ORIENTATION: LOWER
ORIENTATION: LEFT;LOWER
ORIENTATION: LOWER

## 2025-06-18 ASSESSMENT — PAIN - FUNCTIONAL ASSESSMENT: PAIN_FUNCTIONAL_ASSESSMENT: 0-10

## 2025-06-18 ASSESSMENT — LIFESTYLE VARIABLES: SMOKING_STATUS: 1

## 2025-06-18 NOTE — ANESTHESIA PRE PROCEDURE
Department of Anesthesiology  Preprocedure Note       Name:  Eve Vázquez   Age:  49 y.o.  :  1975                                          MRN:  [id number]         Date:  2025      Surgeon: Surgeon(s):  Earnest Beltran MD    Procedure: Procedure(s):  REDO L4-5 LUMBAR LAMINECTOMY, NERVE ROOT FORAMINOTOMY, POSSIBLE  DISCECTOMY (PRONE, C-MAX WITH WHITE ISIDRO FRAME, X-RAY)    Medications prior to admission:   Prior to Admission medications    Medication Sig Start Date End Date Taking? Authorizing Provider   oxyCODONE HCl (OXY-IR) 10 MG immediate release tablet Take 1 tablet by mouth every 8 hours as needed for Pain for up to 30 days. Intended supply: 30 days Max Daily Amount: 30 mg 25  Renetta Og APRN - CNP   amphetamine-dextroamphetamine (ADDERALL, 20MG,) 20 MG tablet Take 1 tablet by mouth daily for 30 days. 25  Renetta Og APRN - CNP   ibuprofen (ADVIL;MOTRIN) 800 MG tablet Take 1 tablet by mouth every 8 hours as needed for Pain 25   Renetta Og APRN - CNP   amphetamine-dextroamphetamine (ADDERALL XR) 20 MG extended release capsule Take 1 capsule by mouth daily for 90 days. 3/4/25 6/4/25  Renetta Og APRN - CNP   tiotropium (SPIRIVA HANDIHALER) 18 MCG inhalation capsule INHALE THE CONTENTS OF 1 CAPSULE USING THE HANDIHALER INTO THE LUNGS DAILY  Patient taking differently: INHALE THE CONTENTS OF 1 CAPSULE USING THE HANDIHALER INTO THE LUNGS DAILY  Has not used in \"a couple months\" 24   Renetta Og APRN - CNP   sennosides-docusate sodium (SENOKOT-S) 8.6-50 MG tablet Take 1-2 tablets daily until desired bm, then 1 daily 24   Renetta Og APRN - CNP   budesonide-formoterol (SYMBICORT) 160-4.5 MCG/ACT AERO INHALE 2 PUFFS INTO THE LUNGS TWICE DAILY 24   Og, Renetta, APRN - CNP   albuterol sulfate HFA (PROVENTIL;VENTOLIN;PROAIR) 108 (90 Base) MCG/ACT inhaler INHALE 1 TO 2 PUFFS BY MOUTH EVERY 4 HOURS AS NEEDED FOR WHEEZING

## 2025-06-18 NOTE — DISCHARGE INSTRUCTIONS
No driving for two weeks after surgery.   No lifting greater than 10 pounds for the first month.   Keep dressing dry and in place until shower on 6/22/2025  Remove dressing after shower on 6/22/2025  May resume ibuprofen on 6/25/2025

## 2025-06-18 NOTE — ANESTHESIA PRE PROCEDURE
Department of Anesthesiology  Preprocedure Note       Name:  Eve Vázquez   Age:  49 y.o.  :  1975                                          MRN:  [id number]         Date:  2025      Surgeon: Surgeon(s):  Earnest Beltran MD    Procedure: Procedure(s):  REDO L4-5 LUMBAR LAMINECTOMY, NERVE ROOT FORAMINOTOMY, POSSIBLE  DISCECTOMY (PRONE, C-MAX WITH WHITE ISIDRO FRAME, X-RAY)    Medications prior to admission:   Prior to Admission medications    Medication Sig Start Date End Date Taking? Authorizing Provider   oxyCODONE HCl (OXY-IR) 10 MG immediate release tablet Take 1 tablet by mouth every 8 hours as needed for Pain for up to 30 days. Intended supply: 30 days Max Daily Amount: 30 mg 25 Yes Renetta Og APRN - CNP   ibuprofen (ADVIL;MOTRIN) 800 MG tablet Take 1 tablet by mouth every 8 hours as needed for Pain 25  Yes Renetta Og APRN - CNP   tiotropium (SPIRIVA HANDIHALER) 18 MCG inhalation capsule INHALE THE CONTENTS OF 1 CAPSULE USING THE HANDIHALER INTO THE LUNGS DAILY  Patient taking differently: INHALE THE CONTENTS OF 1 CAPSULE USING THE HANDIHALER INTO THE LUNGS DAILY  Has not used in \"a couple months\" 24  Yes Renetta Og APRN - CNP   sennosides-docusate sodium (SENOKOT-S) 8.6-50 MG tablet Take 1-2 tablets daily until desired bm, then 1 daily 24  Yes Renetta Og APRN - CNP   budesonide-formoterol (SYMBICORT) 160-4.5 MCG/ACT AERO INHALE 2 PUFFS INTO THE LUNGS TWICE DAILY 24  Yes Renetta Og APRN - CNP   albuterol sulfate HFA (PROVENTIL;VENTOLIN;PROAIR) 108 (90 Base) MCG/ACT inhaler INHALE 1 TO 2 PUFFS BY MOUTH EVERY 4 HOURS AS NEEDED FOR WHEEZING 24  Yes Renetta Og APRN - CNP   Multiple Vitamins-Minerals (CENTRUM SILVER PO) Take by mouth   Yes Provider, MD Monica   amphetamine-dextroamphetamine (ADDERALL, 20MG,) 20 MG tablet Take 1 tablet by mouth daily for 30 days. 25  Renetta Og, APRN - CNP

## 2025-06-18 NOTE — OP NOTE
Operative Note      Patient: Eve Vázquez  YOB: 1975  MRN: [id number]    Date of Procedure: 6/18/2025    Pre-Op Diagnosis Codes:      * Lumbosacral disc herniation [M51.27]    Post-Op Diagnosis: Same       Procedure(s):  REDO L4-5 LUMBAR LAMINECTOMY, NERVE ROOT FORAMINOTOMY.    Surgeon(s):  Earnest Beltran MD    Assistant:   Physician Assistant: Sarah Whitt PA    Anesthesia: General    Estimated Blood Loss (mL): Minimal    Complications: None    Specimens:   * No specimens in log *    Implants:  * No implants in log *      Drains: * No LDAs found *    Findings:  Infection Present At Time Of Surgery (PATOS) (choose all levels that have infection present):  No infection present  Other Findings:     Detailed Description of Procedure:   Patient is a 49-year-old female who presents with ongoing history of back as well as left lower extremity discomfort consistent with ongoing lumbar radiculopathy.  Patient has undergone previous surgery at the L4-5 level on the left in the past.  Patient underwent a lumbar MRI which did demonstrate a left paracentral disc/osteophytic complex at the L4-5 level resulting in severe foraminal stenosis with impingement to the exiting nerve root.  Postoperative changes in form of the laminectomy were noted at the L4-5 level.  Further treatment options were discussed, conservative measures versus surgical intervention.  After reviewing the radiographic images as well as discussed the details versus surgery, the patient wished of going further conservative measures in favor surgical invention.  Thus he was scheduled for surgery as outlined below.    The patient was transported from the preanesthesia area to the operating room.  After the adequate induction of general tracheal anesthesia, the patient was placed onto the operating table in the prone position onto a Hany frame.  The appropriate pressure points about the body were carefully padded.  Preoperative IV

## 2025-06-18 NOTE — INTERVAL H&P NOTE
Pt Name: Eve Vázquez  MRN: [id number]  YOB: 1975  Date of evaluation: 6/18/2025    I have reviewed the patient's history and physical examination completed in pre-admission testing on 6/11/25    No changes to history or on examination today, unless noted below.   Denies health changes since PAT visit.   On exam today, patient grimacing with repositioning. Initial faint wheeze that clears with induced cough.     KIN VASQUEZ - CNP  6/18/25  7:11 AM

## 2025-06-24 NOTE — ANESTHESIA POSTPROCEDURE EVALUATION
Department of Anesthesiology  Postprocedure Note    Patient: Eve Vázquez  MRN: [id number]  YOB: 1975  Date of evaluation: 6/24/2025    Procedure Summary       Date: 06/18/25 Room / Location: [address]    Anesthesia Start: 0753 Anesthesia Stop: 0938    Procedure: REDO L4-5 LUMBAR LAMINECTOMY, NERVE ROOT FORAMINOTOMY. (Left: Spine Lumbar) Diagnosis:       Lumbosacral disc herniation      (Lumbosacral disc herniation [M51.27])    Surgeons: Earnest Beltran MD Responsible Provider: Paulo Chopra MD    Anesthesia Type: general ASA Status: 3            Anesthesia Type: No value filed.    Silvina Phase I: Silvina Score: 10    Silvina Phase II: Silvina Score: 10    Anesthesia Post Evaluation    Patient location during evaluation: PACU  Patient participation: complete - patient participated  Level of consciousness: awake and alert  Airway patency: patent  Nausea & Vomiting: no nausea and no vomiting  Cardiovascular status: blood pressure returned to baseline  Respiratory status: acceptable  Hydration status: euvolemic  Pain management: adequate    No notable events documented.

## 2025-06-25 DIAGNOSIS — F90.2 ATTENTION DEFICIT HYPERACTIVITY DISORDER (ADHD), COMBINED TYPE: ICD-10-CM

## 2025-06-25 RX ORDER — DEXTROAMPHETAMINE SACCHARATE, AMPHETAMINE ASPARTATE, DEXTROAMPHETAMINE SULFATE AND AMPHETAMINE SULFATE 5; 5; 5; 5 MG/1; MG/1; MG/1; MG/1
20 TABLET ORAL DAILY
Qty: 30 TABLET | Refills: 0 | Status: SHIPPED | OUTPATIENT
Start: 2025-06-25 | End: 2025-07-25

## 2025-06-26 DIAGNOSIS — J41.8 MIXED SIMPLE AND MUCOPURULENT CHRONIC BRONCHITIS (HCC): ICD-10-CM

## 2025-06-26 RX ORDER — ALBUTEROL SULFATE 90 UG/1
INHALANT RESPIRATORY (INHALATION)
Qty: 8.5 G | Refills: 5 | Status: SHIPPED | OUTPATIENT
Start: 2025-06-26

## 2025-06-28 DIAGNOSIS — G89.18 ACUTE POST-OPERATIVE PAIN: Primary | ICD-10-CM

## 2025-06-30 RX ORDER — TIZANIDINE 2 MG/1
2 TABLET ORAL EVERY 8 HOURS PRN
Qty: 42 TABLET | Refills: 0 | Status: SHIPPED | OUTPATIENT
Start: 2025-07-03 | End: 2025-07-17

## 2025-06-30 NOTE — TELEPHONE ENCOUNTER
Redo L4-5 lami w/NRF on 6/18/25.    Tizanidine 2mg TID PRN last prescribed on 6/18/25 with end date of 7/3/25.     Patient has nurse visit appt scheduled for 7/2 for staple removal and 1st PO f/u with Dr Beltran scheduled for 7/21 0360.

## 2025-07-02 ENCOUNTER — CLINICAL SUPPORT (OUTPATIENT)
Age: 50
End: 2025-07-02

## 2025-07-02 NOTE — PROGRESS NOTES
Nurse Visit Suture Removal/Wound Check        Date:  2025   Patient:  Eve Vázquez   :  1975   Reason for appt:    Chief Complaint   Patient presents with    Post-Op Check     NV staple removal         Operating Physician:  Dr. Ruby MD    Procedure:  redo L4-5 lami w/NRF  Date of Procedure:  25  Vital signs:  LMP 06/15/2015 (Exact Date)      ______________________________________________________________________________  Patient reported S/SX post-op:   [] Fever     [] Drainage   [] Redness   [] Swelling    [] Pain   [x] Other patient denies any of the above s/sx.     Current Drainage:  no    Skin Edges Approximated:  yes -     Wound Measurements:  N/A    Current Observation of Incision Site/Wound:  lumbar incision observed per nursing. Clean, dry, staples intact, ANIVAL. Patient denies fevers, redness, swelling, drainage at home.    Evaluated by Physician:  no    Pain Assessment:  patient denies pain at present. States mild discomfort in back and legs but improving each day. States still has some pain meds and muscle relaxers remaining-denies need for refill at this time. Patient ambulating without assistance, she states she doesn't even have to use a cane anymore which she used prior to surgery.     Treatment:  lumbar incision assessed per nursing. Incision cleansed with betadine prior to staple removal. All skin staples removed without difficulty, pt lavell well. Incision covered with [facility] dr[facility] for transport home-pt educated and agrees to remove drsg either later tonight or marissa am. Patient educated on post op incisional care, activity, pain meds and management and f/u appts-VU. Agrees to call the office with any  new or worsening s/sx, questions or concerns.     Return Visit: No follow-ups on file.   Appointment scheduled: 2025 1130- confirmed with pt.   JODI LIRA RN

## 2025-07-21 ENCOUNTER — OFFICE VISIT (OUTPATIENT)
Age: 50
End: 2025-07-21

## 2025-07-21 VITALS
BODY MASS INDEX: 29.16 KG/M2 | DIASTOLIC BLOOD PRESSURE: 78 MMHG | HEIGHT: 65 IN | HEART RATE: 70 BPM | SYSTOLIC BLOOD PRESSURE: 120 MMHG | WEIGHT: 175 LBS

## 2025-07-21 DIAGNOSIS — M51.26 LUMBAR DISC HERNIATION: Primary | ICD-10-CM

## 2025-07-21 PROCEDURE — 99024 POSTOP FOLLOW-UP VISIT: CPT | Performed by: PHYSICIAN ASSISTANT

## 2025-07-21 NOTE — PROGRESS NOTES
I had the pleasure of seeing Eve Vázquez in the office today in follow up. Patient is a 50 y.o.. female who underwent redo lower back surgery on the left at the L4-5 level 1 month ago.  Postoperatively she states her left leg pain has improved significantly compared to her preoperative status.  She reports an appropriate amount of remaining lower back and left leg pain at today's visit.  She has no new complaints.  Vitals:    07/21/25 1127   BP: 120/78   Pulse: 70     Examination today demonstrates the incision to be healing well. The incision is nonerythematous and nontender.  Patient has age-appropriate strength in both upper and lower extremities.  Gait is steady.    We did review postoperative X-rays in the office today.  These x-rays demonstrate the instrumentation to be in good position in both AP lateral flexion-extension views.  There is no evidence of instability between flexion-extension positions.     I reviewed remaining activity restrictions and limitations for the upcoming months.  I did take this opportunity to answer any remaining questions the patient had in the office today and welcomed them to feel free to contact us back at any point in the interim should they have any problems or questions we could be of assistance with. We will plan on seeing the patient back in our office in 2 months for her next postoperative visit

## 2025-07-22 ENCOUNTER — PATIENT MESSAGE (OUTPATIENT)
Dept: PRIMARY CARE CLINIC | Age: 50
End: 2025-07-22

## 2025-07-22 DIAGNOSIS — M51.26 DISPLACEMENT OF LUMBAR INTERVERTEBRAL DISC WITHOUT MYELOPATHY: ICD-10-CM

## 2025-07-22 DIAGNOSIS — F90.2 ATTENTION DEFICIT HYPERACTIVITY DISORDER (ADHD), COMBINED TYPE: ICD-10-CM

## 2025-07-22 RX ORDER — DEXTROAMPHETAMINE SACCHARATE, AMPHETAMINE ASPARTATE, DEXTROAMPHETAMINE SULFATE AND AMPHETAMINE SULFATE 5; 5; 5; 5 MG/1; MG/1; MG/1; MG/1
20 TABLET ORAL DAILY
Qty: 30 TABLET | Refills: 0 | Status: SHIPPED | OUTPATIENT
Start: 2025-07-22 | End: 2025-08-21

## 2025-07-22 RX ORDER — OXYCODONE AND ACETAMINOPHEN 5; 325 MG/1; MG/1
1 TABLET ORAL EVERY 8 HOURS PRN
Qty: 90 TABLET | Refills: 0 | Status: SHIPPED | OUTPATIENT
Start: 2025-07-22 | End: 2025-08-21

## 2025-08-26 DIAGNOSIS — F90.2 ATTENTION DEFICIT HYPERACTIVITY DISORDER (ADHD), COMBINED TYPE: ICD-10-CM

## 2025-08-26 DIAGNOSIS — J41.8 MIXED SIMPLE AND MUCOPURULENT CHRONIC BRONCHITIS (HCC): ICD-10-CM

## 2025-08-26 RX ORDER — ALBUTEROL SULFATE 90 UG/1
INHALANT RESPIRATORY (INHALATION)
Qty: 8.5 G | Refills: 5 | Status: SHIPPED | OUTPATIENT
Start: 2025-08-26

## 2025-08-26 RX ORDER — DEXTROAMPHETAMINE SACCHARATE, AMPHETAMINE ASPARTATE MONOHYDRATE, DEXTROAMPHETAMINE SULFATE AND AMPHETAMINE SULFATE 5; 5; 5; 5 MG/1; MG/1; MG/1; MG/1
20 CAPSULE, EXTENDED RELEASE ORAL DAILY
Qty: 90 CAPSULE | Refills: 0 | Status: SHIPPED | OUTPATIENT
Start: 2025-08-26 | End: 2025-11-24

## 2025-08-26 RX ORDER — DEXTROAMPHETAMINE SACCHARATE, AMPHETAMINE ASPARTATE, DEXTROAMPHETAMINE SULFATE AND AMPHETAMINE SULFATE 5; 5; 5; 5 MG/1; MG/1; MG/1; MG/1
20 TABLET ORAL DAILY
Qty: 30 TABLET | Refills: 0 | Status: SHIPPED | OUTPATIENT
Start: 2025-08-26 | End: 2025-09-25

## (undated) DEVICE — ELECTRODE PT RET AD L9FT HI MOIST COND ADH HYDRGEL CORDED

## (undated) DEVICE — GLOVE ORANGE PI 7 1/2   MSG9075

## (undated) DEVICE — PROTECTOR ULN NRV PUR FOAM HK LOOP STRP ANATOMICALLY

## (undated) DEVICE — GOWN,AURORA,NONREINFORCED,LARGE: Brand: MEDLINE

## (undated) DEVICE — GLOVE SURG SZ 75 L12IN FNGR THK79MIL GRN LTX FREE

## (undated) DEVICE — SHEET, T, LAPAROTOMY, STERILE: Brand: MEDLINE

## (undated) DEVICE — SUTURE VICRYL + SZ 0 L18IN ABSRB UD L36MM CT-1 1/2 CIR VCP840D

## (undated) DEVICE — DRAPE,REIN 53X77,STERILE: Brand: MEDLINE

## (undated) DEVICE — AGENT HEMOSTATIC SURGIFLOW MATRIX KIT W/THROMBIN

## (undated) DEVICE — GLOVE SURG SZ 65 CRM LTX FREE POLYISOPRENE POLYMER BEAD ANTI

## (undated) DEVICE — 3.0MM PRECISION NEURO (MATCH HEAD)

## (undated) DEVICE — GLOVE SURG SZ 65 L12IN FNGR THK79MIL GRN LTX FREE

## (undated) DEVICE — BLADE,CARBON-STEEL,11,STRL,DISPOSABLE,TB: Brand: MEDLINE

## (undated) DEVICE — STRAP,POSITIONING,KNEE/BODY,FOAM,4X60": Brand: MEDLINE

## (undated) DEVICE — GARMENT,MEDLINE,DVT,INT,CALF,MED, GEN2: Brand: MEDLINE

## (undated) DEVICE — 1000 S-DRAPE TOWEL DRAPE 10/BX: Brand: STERI-DRAPE™

## (undated) DEVICE — STVZ LUMBAR SPINE PACK: Brand: MEDLINE INDUSTRIES, INC.

## (undated) DEVICE — STAZ ENDO KIT: Brand: MEDLINE INDUSTRIES, INC.

## (undated) DEVICE — SPONGE LAP W18XL18IN WHT COT 4 PLY FLD STRUNG RADPQ DISP ST 2 PER PACK

## (undated) DEVICE — SUTURE VICRYL SZ 2-0 L18IN ABSRB VLT L26MM SH 1/2 CIR J775D

## (undated) DEVICE — DRESSING BORDERED ADH GZ UNIV GEN USE 8INX4IN AND 6INX2IN